# Patient Record
Sex: MALE | Race: OTHER | Employment: FULL TIME | URBAN - METROPOLITAN AREA
[De-identification: names, ages, dates, MRNs, and addresses within clinical notes are randomized per-mention and may not be internally consistent; named-entity substitution may affect disease eponyms.]

---

## 2022-07-01 ENCOUNTER — HOSPITAL ENCOUNTER (EMERGENCY)
Dept: ULTRASOUND IMAGING | Age: 42
Discharge: HOME | End: 2022-07-04
Payer: COMMERCIAL

## 2022-07-01 ENCOUNTER — HOSPITAL ENCOUNTER (EMERGENCY)
Age: 42
Discharge: HOME | End: 2022-07-01
Attending: EMERGENCY MEDICINE
Payer: COMMERCIAL

## 2022-07-01 ENCOUNTER — HOSPITAL ENCOUNTER (EMERGENCY)
Dept: CT IMAGING | Age: 42
Discharge: HOME | End: 2022-07-04
Payer: COMMERCIAL

## 2022-07-01 ENCOUNTER — HOSPITAL ENCOUNTER (INPATIENT)
Age: 42
LOS: 4 days | Discharge: HOME | DRG: 723 | End: 2022-07-05
Attending: FAMILY MEDICINE
Payer: COMMERCIAL

## 2022-07-01 VITALS
HEART RATE: 80 BPM | HEIGHT: 74 IN | SYSTOLIC BLOOD PRESSURE: 152 MMHG | RESPIRATION RATE: 16 BRPM | WEIGHT: 255 LBS | OXYGEN SATURATION: 97 % | DIASTOLIC BLOOD PRESSURE: 99 MMHG | TEMPERATURE: 97.9 F | BODY MASS INDEX: 32.73 KG/M2

## 2022-07-01 DIAGNOSIS — G89.3 CANCER ASSOCIATED PAIN: ICD-10-CM

## 2022-07-01 DIAGNOSIS — N50.89 TESTICULAR MASS: Primary | ICD-10-CM

## 2022-07-01 DIAGNOSIS — Z01.818 EXAMINATION PRIOR TO CHEMOTHERAPY: ICD-10-CM

## 2022-07-01 DIAGNOSIS — C62.90 SEMINOMA (HCC): Primary | ICD-10-CM

## 2022-07-01 LAB
ALBUMIN SERPL-MCNC: 4 G/DL (ref 3.5–5)
ALBUMIN/GLOB SERPL: 0.8 {RATIO} (ref 1.2–3.5)
ALP SERPL-CCNC: 140 U/L (ref 50–136)
ALT SERPL-CCNC: 57 U/L (ref 12–65)
ANION GAP SERPL CALC-SCNC: 6 MMOL/L (ref 7–16)
AST SERPL-CCNC: 35 U/L (ref 15–37)
BASOPHILS # BLD: 0 K/UL (ref 0–0.2)
BASOPHILS NFR BLD: 0 % (ref 0–2)
BILIRUB SERPL-MCNC: 0.6 MG/DL (ref 0.2–1.1)
BUN SERPL-MCNC: 16 MG/DL (ref 6–23)
CALCIUM SERPL-MCNC: 9.7 MG/DL (ref 8.3–10.4)
CHLORIDE SERPL-SCNC: 107 MMOL/L (ref 98–107)
CO2 SERPL-SCNC: 26 MMOL/L (ref 21–32)
CREAT SERPL-MCNC: 1.22 MG/DL (ref 0.8–1.5)
DIFFERENTIAL METHOD BLD: ABNORMAL
EOSINOPHIL # BLD: 0.1 K/UL (ref 0–0.8)
EOSINOPHIL NFR BLD: 1 % (ref 0.5–7.8)
ERYTHROCYTE [DISTWIDTH] IN BLOOD BY AUTOMATED COUNT: 12.7 % (ref 11.9–14.6)
GLOBULIN SER CALC-MCNC: 5.2 G/DL (ref 2.3–3.5)
GLUCOSE SERPL-MCNC: 169 MG/DL (ref 65–100)
HCG SERPL QL: POSITIVE
HCT VFR BLD AUTO: 44.6 % (ref 41.1–50.3)
HGB BLD-MCNC: 15.3 G/DL (ref 13.6–17.2)
IMM GRANULOCYTES # BLD AUTO: 0 K/UL (ref 0–0.5)
IMM GRANULOCYTES NFR BLD AUTO: 0 % (ref 0–5)
LYMPHOCYTES # BLD: 1.4 K/UL (ref 0.5–4.6)
LYMPHOCYTES NFR BLD: 20 % (ref 13–44)
MCH RBC QN AUTO: 29.4 PG (ref 26.1–32.9)
MCHC RBC AUTO-ENTMCNC: 34.3 G/DL (ref 31.4–35)
MCV RBC AUTO: 85.8 FL (ref 79.6–97.8)
MONOCYTES # BLD: 0.9 K/UL (ref 0.1–1.3)
MONOCYTES NFR BLD: 13 % (ref 4–12)
NEUTS SEG # BLD: 4.5 K/UL (ref 1.7–8.2)
NEUTS SEG NFR BLD: 66 % (ref 43–78)
NRBC # BLD: 0 K/UL (ref 0–0.2)
PLATELET # BLD AUTO: 297 K/UL (ref 150–450)
PMV BLD AUTO: 8.9 FL (ref 9.4–12.3)
POTASSIUM SERPL-SCNC: 3.8 MMOL/L (ref 3.5–5.1)
PROT SERPL-MCNC: 9.2 G/DL (ref 6.3–8.2)
RBC # BLD AUTO: 5.2 M/UL (ref 4.23–5.6)
SODIUM SERPL-SCNC: 139 MMOL/L (ref 136–145)
WBC # BLD AUTO: 6.9 K/UL (ref 4.3–11.1)

## 2022-07-01 PROCEDURE — 2580000003 HC RX 258: Performed by: FAMILY MEDICINE

## 2022-07-01 PROCEDURE — 99285 EMERGENCY DEPT VISIT HI MDM: CPT

## 2022-07-01 PROCEDURE — 85025 COMPLETE CBC W/AUTO DIFF WBC: CPT

## 2022-07-01 PROCEDURE — 80053 COMPREHEN METABOLIC PANEL: CPT

## 2022-07-01 PROCEDURE — 84703 CHORIONIC GONADOTROPIN ASSAY: CPT

## 2022-07-01 PROCEDURE — 76870 US EXAM SCROTUM: CPT

## 2022-07-01 PROCEDURE — 74176 CT ABD & PELVIS W/O CONTRAST: CPT

## 2022-07-01 PROCEDURE — 1100000000 HC RM PRIVATE

## 2022-07-01 PROCEDURE — 82105 ALPHA-FETOPROTEIN SERUM: CPT

## 2022-07-01 RX ORDER — ATORVASTATIN CALCIUM 20 MG/1
20 TABLET, FILM COATED ORAL NIGHTLY
Status: DISCONTINUED | OUTPATIENT
Start: 2022-07-01 | End: 2022-07-05 | Stop reason: HOSPADM

## 2022-07-01 RX ORDER — ENOXAPARIN SODIUM 100 MG/ML
30 INJECTION SUBCUTANEOUS EVERY 12 HOURS
Status: DISCONTINUED | OUTPATIENT
Start: 2022-07-02 | End: 2022-07-02

## 2022-07-01 RX ORDER — ATORVASTATIN CALCIUM 20 MG/1
20 TABLET, FILM COATED ORAL NIGHTLY
COMMUNITY
Start: 2022-03-21

## 2022-07-01 RX ORDER — POLYETHYLENE GLYCOL 3350 17 G/17G
17 POWDER, FOR SOLUTION ORAL DAILY PRN
Status: DISCONTINUED | OUTPATIENT
Start: 2022-07-01 | End: 2022-07-05 | Stop reason: HOSPADM

## 2022-07-01 RX ORDER — ONDANSETRON 2 MG/ML
4 INJECTION INTRAMUSCULAR; INTRAVENOUS EVERY 6 HOURS PRN
Status: DISCONTINUED | OUTPATIENT
Start: 2022-07-01 | End: 2022-07-05 | Stop reason: HOSPADM

## 2022-07-01 RX ORDER — SODIUM CHLORIDE 9 MG/ML
INJECTION, SOLUTION INTRAVENOUS PRN
Status: DISCONTINUED | OUTPATIENT
Start: 2022-07-01 | End: 2022-07-05 | Stop reason: HOSPADM

## 2022-07-01 RX ORDER — ACETAMINOPHEN 650 MG/1
650 SUPPOSITORY RECTAL EVERY 6 HOURS PRN
Status: DISCONTINUED | OUTPATIENT
Start: 2022-07-01 | End: 2022-07-05 | Stop reason: HOSPADM

## 2022-07-01 RX ORDER — ONDANSETRON 4 MG/1
4 TABLET, ORALLY DISINTEGRATING ORAL EVERY 8 HOURS PRN
Status: DISCONTINUED | OUTPATIENT
Start: 2022-07-01 | End: 2022-07-05 | Stop reason: HOSPADM

## 2022-07-01 RX ORDER — TELMISARTAN AND AMLODIPINE 10; 80 MG/1; MG/1
1 TABLET ORAL NIGHTLY
Status: ON HOLD | COMMUNITY
Start: 2022-06-29 | End: 2022-07-02

## 2022-07-01 RX ORDER — METFORMIN HYDROCHLORIDE 500 MG/1
TABLET, EXTENDED RELEASE ORAL
COMMUNITY
Start: 2022-06-29

## 2022-07-01 RX ORDER — SODIUM CHLORIDE 0.9 % (FLUSH) 0.9 %
5-40 SYRINGE (ML) INJECTION EVERY 12 HOURS SCHEDULED
Status: DISCONTINUED | OUTPATIENT
Start: 2022-07-01 | End: 2022-07-05 | Stop reason: HOSPADM

## 2022-07-01 RX ORDER — ATENOLOL 25 MG/1
50 TABLET ORAL DAILY
Status: DISCONTINUED | OUTPATIENT
Start: 2022-07-02 | End: 2022-07-05 | Stop reason: HOSPADM

## 2022-07-01 RX ORDER — TELMISARTAN AND AMLODIPINE 10; 80 MG/1; MG/1
1 TABLET ORAL NIGHTLY
Status: DISCONTINUED | OUTPATIENT
Start: 2022-07-01 | End: 2022-07-01 | Stop reason: CLARIF

## 2022-07-01 RX ORDER — ATENOLOL 50 MG/1
50 TABLET ORAL DAILY
COMMUNITY
Start: 2022-06-29

## 2022-07-01 RX ORDER — SPIRONOLACTONE 25 MG/1
50 TABLET ORAL DAILY
Status: DISCONTINUED | OUTPATIENT
Start: 2022-07-02 | End: 2022-07-05 | Stop reason: HOSPADM

## 2022-07-01 RX ORDER — PREDNISONE 20 MG/1
TABLET ORAL
Status: ON HOLD | COMMUNITY
Start: 2022-06-29 | End: 2022-07-05 | Stop reason: HOSPADM

## 2022-07-01 RX ORDER — SODIUM CHLORIDE 0.9 % (FLUSH) 0.9 %
5-40 SYRINGE (ML) INJECTION PRN
Status: DISCONTINUED | OUTPATIENT
Start: 2022-07-01 | End: 2022-07-05 | Stop reason: HOSPADM

## 2022-07-01 RX ORDER — HYDROCODONE BITARTRATE AND ACETAMINOPHEN 5; 325 MG/1; MG/1
1 TABLET ORAL EVERY 6 HOURS PRN
Status: DISCONTINUED | OUTPATIENT
Start: 2022-07-01 | End: 2022-07-05 | Stop reason: HOSPADM

## 2022-07-01 RX ORDER — TIZANIDINE 4 MG/1
4 TABLET ORAL NIGHTLY
COMMUNITY
Start: 2022-06-29 | End: 2022-07-06

## 2022-07-01 RX ORDER — AMLODIPINE BESYLATE 10 MG/1
10 TABLET ORAL NIGHTLY
Status: DISCONTINUED | OUTPATIENT
Start: 2022-07-01 | End: 2022-07-02 | Stop reason: SDUPTHER

## 2022-07-01 RX ORDER — SPIRONOLACTONE 50 MG/1
50 TABLET, FILM COATED ORAL DAILY
COMMUNITY
Start: 2022-03-21

## 2022-07-01 RX ORDER — LOSARTAN POTASSIUM 50 MG/1
100 TABLET ORAL NIGHTLY
Status: DISCONTINUED | OUTPATIENT
Start: 2022-07-01 | End: 2022-07-02 | Stop reason: SDUPTHER

## 2022-07-01 RX ORDER — MORPHINE SULFATE 2 MG/ML
1 INJECTION, SOLUTION INTRAMUSCULAR; INTRAVENOUS EVERY 4 HOURS PRN
Status: DISCONTINUED | OUTPATIENT
Start: 2022-07-01 | End: 2022-07-05 | Stop reason: HOSPADM

## 2022-07-01 RX ORDER — ACETAMINOPHEN 325 MG/1
650 TABLET ORAL EVERY 6 HOURS PRN
Status: DISCONTINUED | OUTPATIENT
Start: 2022-07-01 | End: 2022-07-05 | Stop reason: HOSPADM

## 2022-07-01 RX ADMIN — SODIUM CHLORIDE, PRESERVATIVE FREE 10 ML: 5 INJECTION INTRAVENOUS at 22:39

## 2022-07-01 ASSESSMENT — ENCOUNTER SYMPTOMS
DIARRHEA: 0
CONSTIPATION: 0
SHORTNESS OF BREATH: 0
NAUSEA: 0
BACK PAIN: 1
ABDOMINAL PAIN: 1
VOMITING: 0

## 2022-07-01 ASSESSMENT — PAIN - FUNCTIONAL ASSESSMENT: PAIN_FUNCTIONAL_ASSESSMENT: 0-10

## 2022-07-01 ASSESSMENT — PAIN SCALES - GENERAL
PAINLEVEL_OUTOF10: 0
PAINLEVEL_OUTOF10: 0

## 2022-07-01 NOTE — ED PROVIDER NOTES
[employer] Emergency Department Provider Note                   PCP:                None Provider               Age: 38 y.o. Sex: male       ICD-10-CM    1. Testicular mass  N50.89        DISPOSITION         New Prescriptions    No medications on file       Orders Placed This Encounter   Procedures    US SCROTUM AND TESTICLES    CT ABDOMEN PELVIS RENAL STONE Additional Contrast? None    CMP    CBC with Auto Differential    HCG Qualitative, Serum    AFP Tumor Marker    POCT Urine Dipstick    Saline lock IV        Kaylee Finn, APRN - CNP 8:42 PM      MDM  Number of Diagnoses or Management Options  Testicular mass  Diagnosis management comments: 74-year-old male with a history of hypertension and diabetes who presents emergency department today with complaint of 1 month of bilateral lower back pain that radiates into his lower abdomen. He states he noticed some left testicle swelling on Monday or Tuesday of this week. He denies any pain in his testicles. He appears in no acute distress today. Examination of the testicles was performed by Dr. Saurav Elmore. Spoke with radiologist regarding CT and ultrasound results. Concerning for testicular cancer with metastasis. Noted severe IVC narrowing. All results were discussed with the patient. He verbalizes understanding agreement of the concerns for testicular cancer with metastasis. He is agreeable to further work-up. Patient to be admitted with hospitalist at [facility]. Oncology states they will see him tomorrow. [facility] Initially he was not wanting to stay in the hospital overnight. I discussed the benefits of being admitted and having oncology see him in the morning. Patient was then agreeable for admission. He does wish to have his spouse drive him downtown. The charge nurse, Tee Martin states that this can be arranged and she will get everything set up.   Awaiting for the patient to get a and nursing note reviewed. Patient Vitals for the past 4 hrs:   Temp Pulse Resp BP SpO2   07/01/22 1808 97.9 °F (36.6 °C) 81 18 (!) 161/96 98 %          Physical Exam  Vitals and nursing note reviewed. Constitutional:       General: He is not in acute distress. Appearance: Normal appearance. He is obese. He is not ill-appearing, toxic-appearing or diaphoretic. HENT:      Head: Normocephalic and atraumatic. Right Ear: External ear normal.      Left Ear: External ear normal.      Nose: Nose normal.   Eyes:      Extraocular Movements: Extraocular movements intact. Conjunctiva/sclera: Conjunctivae normal.   Cardiovascular:      Rate and Rhythm: Normal rate. Heart sounds: Normal heart sounds. Pulmonary:      Effort: Pulmonary effort is normal. No respiratory distress. Breath sounds: Normal breath sounds. Abdominal:      General: Abdomen is flat. Bowel sounds are normal. There is no distension. Palpations: Abdomen is soft. Tenderness: There is abdominal tenderness in the right lower quadrant and left lower quadrant. Musculoskeletal:         General: Normal range of motion. Cervical back: Normal range of motion. Skin:     General: Skin is warm and dry. Capillary Refill: Capillary refill takes less than 2 seconds. Neurological:      General: No focal deficit present. Mental Status: He is alert and oriented to person, place, and time. Psychiatric:         Mood and Affect: Mood normal.         Behavior: Behavior normal.         Thought Content:  Thought content normal.         Judgment: Judgment normal.          Procedures    Labs Reviewed   COMPREHENSIVE METABOLIC PANEL - Abnormal; Notable for the following components:       Result Value    Anion Gap 6 (*)     Glucose 169 (*)     Alk Phosphatase 140 (*)     Total Protein 9.2 (*)     Globulin 5.2 (*)     Albumin/Globulin Ratio 0.8 (*)     All other components within normal limits   CBC WITH AUTO DIFFERENTIAL - Abnormal; Notable for the following components:    MPV 8.9 (*)     Monocytes 13 (*)     All other components within normal limits   HCG, SERUM, QUALITATIVE   AFP TUMOR MARKER        US SCROTUM AND TESTICLES   Final Result   1. Large left-sided testicular mass highly concerning for testicular malignancy   measuring 4.4 x 3.7 x 3.9 cm. Retroperitoneal lymphadenopathy on abdominal CT is   highly suspicious for metastatic disease. 2. Left-sided varicocele which increases with Valsalva. 3. Small bilateral hydroceles. Results discussed with Dr. Avalos over telephone with Dr. Masha Poole at 7:37 PM on   Friday, July 01, 2022. CT ABDOMEN PELVIS RENAL STONE Additional Contrast? None   Final Result   1. Massive retroperitoneal lymph node conglomerates and enlarged retrocrural   lymph node suspicious for lymphoma versus metastatic disease. There is severe   luminal narrowing of the IVC given mass effect upon the IVC and mass effect upon   the abdominal aorta as well as the iliopsoas musculature which is more common of   metastatic lymph nodes than lymphoma although remains indeterminate. 2. Splenomegaly although with a long slender appearance of the spleen measuring   up to 15 cm. 3. Given reported testicular pain and retroperitoneal lymphadenopathy a   testicular ultrasound is recommended for further evaluation to evaluate for   possible testicular malignancy. These results were communicated to Dr. Avalos over telephone with Dr. Masha Poole at   6:48 PM on Friday, July 01, 2022. ED Course as of 07/01/22 2042   Nuha Suarez Jul 01, 2022   1905 CT ABDOMEN PELVIS RENAL STONE Additional Contrast? None  IMPRESSION:  1. Massive retroperitoneal lymph node conglomerates and enlarged retrocrural  lymph node suspicious for lymphoma versus metastatic disease.  There is severe  luminal narrowing of the IVC given mass effect upon the IVC and mass effect upon  the abdominal aorta as well as the iliopsoas musculature which is more common of  metastatic lymph nodes than lymphoma although remains indeterminate. 2. Splenomegaly although with a long slender appearance of the spleen measuring  up to 15 cm. 3. Given reported testicular pain and retroperitoneal lymphadenopathy a  testicular ultrasound is recommended for further evaluation to evaluate for  possible testicular malignancy.    [BC]   1906 Perfect serve message to Dr. Min Busjerzy with urology. He states that he will review and arrange for a follow-up. [BC]   1945 US SCROTUM AND TESTICLES  IMPRESSION:  1. Large left-sided testicular mass highly concerning for testicular malignancy  measuring 4.4 x 3.7 x 3.9 cm. Retroperitoneal lymphadenopathy on abdominal CT is  highly suspicious for metastatic disease. 2. Left-sided varicocele which increases with Valsalva. 3. Small bilateral hydroceles. [BC]      ED Course User Index  [BC] Akira Alexandra, ROSEMARY - CNP        Voice dictation software was used during the making of this note. This software is not perfect and grammatical and other typographical errors may be present. This note has not been completely proofread for errors.        Akira Alexandra, APRN - [address]   07/01/22 2042

## 2022-07-02 ENCOUNTER — APPOINTMENT (OUTPATIENT)
Dept: CT IMAGING | Age: 42
DRG: 723 | End: 2022-07-02
Attending: FAMILY MEDICINE
Payer: COMMERCIAL

## 2022-07-02 LAB
AFP-TM SERPL-MCNC: 2.2 NG/ML
ANION GAP SERPL CALC-SCNC: 5 MMOL/L (ref 7–16)
BASOPHILS # BLD: 0 K/UL (ref 0–0.2)
BASOPHILS NFR BLD: 1 % (ref 0–2)
BUN SERPL-MCNC: 13 MG/DL (ref 6–23)
CALCIUM SERPL-MCNC: 9 MG/DL (ref 8.3–10.4)
CHLORIDE SERPL-SCNC: 109 MMOL/L (ref 98–107)
CO2 SERPL-SCNC: 27 MMOL/L (ref 21–32)
CREAT SERPL-MCNC: 1.1 MG/DL (ref 0.8–1.5)
DIFFERENTIAL METHOD BLD: ABNORMAL
EOSINOPHIL # BLD: 0.1 K/UL (ref 0–0.8)
EOSINOPHIL NFR BLD: 1 % (ref 0.5–7.8)
ERYTHROCYTE [DISTWIDTH] IN BLOOD BY AUTOMATED COUNT: 12.6 % (ref 11.9–14.6)
GLUCOSE BLD STRIP.AUTO-MCNC: 119 MG/DL (ref 65–100)
GLUCOSE BLD STRIP.AUTO-MCNC: 123 MG/DL (ref 65–100)
GLUCOSE BLD STRIP.AUTO-MCNC: 141 MG/DL (ref 65–100)
GLUCOSE SERPL-MCNC: 100 MG/DL (ref 65–100)
HCT VFR BLD AUTO: 42.1 % (ref 41.1–50.3)
HGB BLD-MCNC: 14.3 G/DL (ref 13.6–17.2)
IMM GRANULOCYTES # BLD AUTO: 0 K/UL (ref 0–0.5)
IMM GRANULOCYTES NFR BLD AUTO: 0 % (ref 0–5)
LYMPHOCYTES # BLD: 1.1 K/UL (ref 0.5–4.6)
LYMPHOCYTES NFR BLD: 16 % (ref 13–44)
MCH RBC QN AUTO: 29.2 PG (ref 26.1–32.9)
MCHC RBC AUTO-ENTMCNC: 34 G/DL (ref 31.4–35)
MCV RBC AUTO: 85.9 FL (ref 79.6–97.8)
MONOCYTES # BLD: 1.1 K/UL (ref 0.1–1.3)
MONOCYTES NFR BLD: 16 % (ref 4–12)
NEUTS SEG # BLD: 4.3 K/UL (ref 1.7–8.2)
NEUTS SEG NFR BLD: 66 % (ref 43–78)
NRBC # BLD: 0 K/UL (ref 0–0.2)
PLATELET # BLD AUTO: 276 K/UL (ref 150–450)
PMV BLD AUTO: 9 FL (ref 9.4–12.3)
POTASSIUM SERPL-SCNC: 3.9 MMOL/L (ref 3.5–5.1)
RBC # BLD AUTO: 4.9 M/UL (ref 4.23–5.6)
SERVICE CMNT-IMP: ABNORMAL
SODIUM SERPL-SCNC: 141 MMOL/L (ref 138–145)
WBC # BLD AUTO: 6.6 K/UL (ref 4.3–11.1)

## 2022-07-02 PROCEDURE — 1100000000 HC RM PRIVATE

## 2022-07-02 PROCEDURE — 74177 CT ABD & PELVIS W/CONTRAST: CPT

## 2022-07-02 PROCEDURE — 6360000002 HC RX W HCPCS: Performed by: INTERNAL MEDICINE

## 2022-07-02 PROCEDURE — 36415 COLL VENOUS BLD VENIPUNCTURE: CPT

## 2022-07-02 PROCEDURE — 6370000000 HC RX 637 (ALT 250 FOR IP): Performed by: FAMILY MEDICINE

## 2022-07-02 PROCEDURE — 99223 1ST HOSP IP/OBS HIGH 75: CPT | Performed by: INTERNAL MEDICINE

## 2022-07-02 PROCEDURE — 94760 N-INVAS EAR/PLS OXIMETRY 1: CPT

## 2022-07-02 PROCEDURE — 6360000004 HC RX CONTRAST MEDICATION: Performed by: INTERNAL MEDICINE

## 2022-07-02 PROCEDURE — 2580000003 HC RX 258: Performed by: FAMILY MEDICINE

## 2022-07-02 PROCEDURE — 85025 COMPLETE CBC W/AUTO DIFF WBC: CPT

## 2022-07-02 PROCEDURE — 82962 GLUCOSE BLOOD TEST: CPT

## 2022-07-02 PROCEDURE — 80048 BASIC METABOLIC PNL TOTAL CA: CPT

## 2022-07-02 RX ORDER — ENOXAPARIN SODIUM 100 MG/ML
40 INJECTION SUBCUTANEOUS DAILY
Status: DISCONTINUED | OUTPATIENT
Start: 2022-07-02 | End: 2022-07-05 | Stop reason: HOSPADM

## 2022-07-02 RX ORDER — ENOXAPARIN SODIUM 100 MG/ML
40 INJECTION SUBCUTANEOUS DAILY
Status: DISCONTINUED | OUTPATIENT
Start: 2022-07-03 | End: 2022-07-02

## 2022-07-02 RX ORDER — INSULIN LISPRO 100 [IU]/ML
0-8 INJECTION, SOLUTION INTRAVENOUS; SUBCUTANEOUS
Status: DISCONTINUED | OUTPATIENT
Start: 2022-07-02 | End: 2022-07-05 | Stop reason: HOSPADM

## 2022-07-02 RX ORDER — TELMISARTAN AND AMLODIPINE 5; 80 MG/1; MG/1
1 TABLET ORAL DAILY
Status: DISCONTINUED | OUTPATIENT
Start: 2022-07-02 | End: 2022-07-05 | Stop reason: HOSPADM

## 2022-07-02 RX ORDER — DEXTROSE MONOHYDRATE 50 MG/ML
100 INJECTION, SOLUTION INTRAVENOUS PRN
Status: DISCONTINUED | OUTPATIENT
Start: 2022-07-02 | End: 2022-07-05 | Stop reason: HOSPADM

## 2022-07-02 RX ORDER — GLUCAGON 1 MG/ML
1 KIT INJECTION PRN
Status: DISCONTINUED | OUTPATIENT
Start: 2022-07-02 | End: 2022-07-05 | Stop reason: HOSPADM

## 2022-07-02 RX ORDER — INSULIN LISPRO 100 [IU]/ML
0-4 INJECTION, SOLUTION INTRAVENOUS; SUBCUTANEOUS NIGHTLY
Status: DISCONTINUED | OUTPATIENT
Start: 2022-07-02 | End: 2022-07-05 | Stop reason: HOSPADM

## 2022-07-02 RX ORDER — TELMISARTAN AND AMLODIPINE 5; 80 MG/1; MG/1
1 TABLET ORAL DAILY
COMMUNITY

## 2022-07-02 RX ADMIN — IOPAMIDOL 100 ML: 755 INJECTION, SOLUTION INTRAVENOUS at 13:22

## 2022-07-02 RX ADMIN — ATORVASTATIN CALCIUM 20 MG: 20 TABLET, FILM COATED ORAL at 21:22

## 2022-07-02 RX ADMIN — ATENOLOL 50 MG: 25 TABLET ORAL at 10:41

## 2022-07-02 RX ADMIN — ENOXAPARIN SODIUM 40 MG: 40 INJECTION SUBCUTANEOUS at 11:47

## 2022-07-02 RX ADMIN — SODIUM CHLORIDE, PRESERVATIVE FREE 10 ML: 5 INJECTION INTRAVENOUS at 21:23

## 2022-07-02 RX ADMIN — TELMISARTAN AND AMLODIPINE 1 TABLET: 5; 80 TABLET ORAL at 10:42

## 2022-07-02 RX ADMIN — SODIUM CHLORIDE, PRESERVATIVE FREE 10 ML: 5 INJECTION INTRAVENOUS at 08:48

## 2022-07-02 RX ADMIN — DIATRIZOATE MEGLUMINE AND DIATRIZOATE SODIUM 15 ML: 660; 100 LIQUID ORAL; RECTAL at 09:58

## 2022-07-02 RX ADMIN — SPIRONOLACTONE 50 MG: 25 TABLET ORAL at 10:40

## 2022-07-02 RX ADMIN — ACETAMINOPHEN 650 MG: 325 TABLET, FILM COATED ORAL at 21:22

## 2022-07-02 ASSESSMENT — PAIN DESCRIPTION - FREQUENCY: FREQUENCY: OTHER (COMMENT)

## 2022-07-02 ASSESSMENT — PAIN SCALES - GENERAL
PAINLEVEL_OUTOF10: 3
PAINLEVEL_OUTOF10: 0

## 2022-07-02 ASSESSMENT — PAIN DESCRIPTION - DESCRIPTORS: DESCRIPTORS: ACHING

## 2022-07-02 ASSESSMENT — PAIN - FUNCTIONAL ASSESSMENT: PAIN_FUNCTIONAL_ASSESSMENT: ACTIVITIES ARE NOT PREVENTED

## 2022-07-02 ASSESSMENT — PAIN DESCRIPTION - PAIN TYPE: TYPE: ACUTE PAIN

## 2022-07-02 ASSESSMENT — PAIN DESCRIPTION - ONSET: ONSET: SUDDEN

## 2022-07-02 ASSESSMENT — PAIN DESCRIPTION - ORIENTATION: ORIENTATION: ANTERIOR

## 2022-07-02 ASSESSMENT — PAIN DESCRIPTION - LOCATION: LOCATION: HEAD

## 2022-07-02 NOTE — CONSULTS
[facility] Hematology & Oncology        Inpatient Hematology / Oncology Consult Note    Reason for Consult:  Testicular mass [N50.89]  Referring Physician:  Anastasia Russo MD    History of Present Illness:  Mr. Gustavo Simmons is a 38 y.o. male admitted on 7/1/2022 with a primary diagnosis of There were no encounter diagnoses. .      38 y.o. male past medical history of hypertension, hyperlipidemia, diabetes, presented to the emergency room with testicle swelling and lower back and abdominal pain. Reported low back pain intermittently over the past month, did not have work-up yet. Now he noticed testicular swelling with pain and presented to the emergency room for further evaluation. Scrotal ultrasound showed large left side testicular mass highly concerning for testicular malignancy measuring 4.4 x 3.7 x 3.9 cm. CT showed massive retroperitoneal lymph nodes conglomerates and a largest retrocrural lymph nodes suspicious for lymphoma versus metastatic disease. There is severe luminal narrowing of the IVC given the mass-effect upon the IVC and mass-effect upon the abdominal aorta as well as the iliopsoas musculature. Oncology is consulted for recommendation. Review of Systems:  Constitutional Denies fever, chills, weight loss, appetite changes, fatigue, night sweats. HEENT Denies trauma, blurry vision, hearing loss, ear pain, nosebleeds, sore throat, neck pain and ear discharge. Skin Denies lesions or rashes. Lungs Denies dyspnea, cough, sputum production or hemoptysis. Cardiovascular Denies chest pain, palpitations, or lower extremity edema. Gastrointestinal Denies nausea, vomiting, changes in bowel habits, bloody or black stools, abdominal pain.   testicular swelling. Denies dysuria, frequency or hesitancy of urination. Neuro Denies headaches, visual changes or ataxia. Denies dizziness, tingling, tremors, sensory change, speech change, focal weakness or headaches.      Hematology Denies easy bruising or bleeding, denies gingival bleeding or epistaxis. Endo Denies heat/cold intolerance, denies diabetes or thyroid abnormalities. MSK  back pain radiating to both legs. Denies arthralgias, myalgias or frequent falls. Psychiatric/Behavioral Denies depression and substance abuse. The patient is not nervous/anxious. No Known Allergies  No past medical history on file. No past surgical history on file. No family history on file. Social History     Socioeconomic History    Marital status:      Spouse name: Not on file    Number of children: Not on file    Years of education: Not on file    Highest education level: Not on file   Occupational History    Not on file   Tobacco Use    Smoking status: Never Smoker    Smokeless tobacco: Not on file   Vaping Use    Vaping Use: Never used   Substance and Sexual Activity    Alcohol use: Not on file    Drug use: Not on file    Sexual activity: Not on file   Other Topics Concern    Not on file   Social History Narrative    Not on file     Social Determinants of Health     Financial Resource Strain:     Difficulty of Paying Living Expenses: Not on file   Food Insecurity:     Worried About Running Out of Food in the Last Year: Not on file    Larry of Food in the Last Year: Not on file   Transportation Needs:     Lack of Transportation (Medical): Not on file    Lack of Transportation (Non-Medical):  Not on file   Physical Activity:     Days of Exercise per Week: Not on file    Minutes of Exercise per Session: Not on file   Stress:     Feeling of Stress : Not on file   Social Connections:     Frequency of Communication with Friends and Family: Not on file    Frequency of Social Gatherings with Friends and Family: Not on file    Attends Buddhist Services: Not on file    Active Member of Clubs or Organizations: Not on file    Attends Club or Organization Meetings: Not on file    Marital Status: Not on file   Intimate Partner Violence:     Fear of Current or Ex-Partner: Not on file    Emotionally Abused: Not on file    Physically Abused: Not on file    Sexually Abused: Not on file   Housing Stability:     Unable to Pay for Housing in the Last Year: Not on file    Number of [place] in the Last Year: Not on file    Unstable Housing in the Last Year: Not on file     Current Facility-Administered Medications   Medication Dose Route Frequency Provider Last Rate Last Admin    insulin lispro (HUMALOG) injection vial 0-8 Units  0-8 Units SubCUTAneous TID WC Lieutenant Adela MD        insulin lispro (HUMALOG) injection vial 0-4 Units  0-4 Units SubCUTAneous Nightly Lieutenant Adela MD        glucose chewable tablet 16 g  4 tablet Oral PRN Lieutenant Adela MD        dextrose bolus 10% 125 mL  125 mL IntraVENous PRN Lieutenant Adela MD        Or    dextrose bolus 10% 250 mL  250 mL IntraVENous PRN Lieutenant Adela MD        glucagon injection 1 mg  1 mg IntraMUSCular PRN Lieutenant Adela MD        dextrose 5 % solution  100 mL/hr IntraVENous PRN Lieutenant Adela MD        diatrizoate meglumine-sodium (GASTROGRAFIN) 66-10 % solution 15 mL  15 mL Oral ONCE PRN Lieutenant Adela MD   15 mL at 07/02/22 0958    telmisartan-amLODIPine (TWYNSTA) 80-5 MG tablet 1 tablet (Patient Supplied)  1 tablet Oral Daily Lieutenant Adela MD   1 tablet at 07/02/22 1042    enoxaparin (LOVENOX) injection 40 mg  40 mg SubCUTAneous Daily Lieutenant Adela MD   40 mg at 07/02/22 1147    atenolol (TENORMIN) tablet 50 mg  50 mg Oral Daily Patricia Vega MD   50 mg at 07/02/22 1041    atorvastatin (LIPITOR) tablet 20 mg  20 mg Oral Nightly Patricia Vega MD        spironolactone (ALDACTONE) tablet 50 mg  50 mg Oral Daily Patricia Vega MD   50 mg at 07/02/22 1040    sodium chloride flush 0.9 % injection 5-40 mL  5-40 mL IntraVENous 2 times per day Patricia Vega MD   10 mL at 07/02/22 0848    sodium chloride flush 0.9 % injection 5-40 mL  5-40 mL IntraVENous CAROLINA Izquierdo MD        0.9 % sodium chloride infusion   IntraVENous PRN Estephanie Izquierdo MD        ondansetron (ZOFRAN-ODT) disintegrating tablet 4 mg  4 mg Oral Q8H PRN Estephanie Izquierdo MD        Or    ondansetron [facility]) injection 4 mg  4 mg IntraVENous Q6H PRN Estephanie Izquierdo MD        polyethylene glycol [facility]) packet 17 g  17 g Oral Daily PRN Estephanie Izquierdo MD        acetaminophen (TYLENOL) tablet 650 mg  650 mg Oral Q6H PRN Estephanie Izquierdo MD        Or    acetaminophen (TYLENOL) suppository 650 mg  650 mg Rectal Q6H PRN Estephanie Izquierdo MD        HYDROcodone-acetaminophen [facility]) 5-325 MG per tablet 1 tablet  1 tablet Oral Q6H PRDAISHA Izquierdo MD        morphine injection 1 mg  1 mg IntraVENous Q4H PRDAISHA Izquierdo MD           OBJECTIVE:  Patient Vitals for the past 8 hrs:   BP Temp Temp src Pulse Resp SpO2   22 1529 (!) 129/95 97.6 °F (36.4 °C) Oral 64 16 98 %   22 1128 (!) 150/109 97.9 °F (36.6 °C) Axillary 86 18 98 %     Temp (24hrs), Av.8 °F (36.6 °C), Min:97.6 °F (36.4 °C), Max:98.1 °F (36.7 °C)    No intake/output data recorded. Physical Exam:  Constitutional: Well developed, well nourished male in no acute distress, sitting comfortably in the hospital bed. HEENT: Normocephalic and atraumatic. Oropharynx is clear, mucous membranes are moist.  Pupils are equal, round, and reactive to light. Extraocular muscles are intact. Sclerae anicteric. Neck supple without JVD. No thyromegaly present. Lymph node   No palpable submandibular, cervical, supraclavicular, axillary or inguinal lymph nodes. Skin Warm and dry. No bruising and no rash noted. No erythema. No pallor. Respiratory Lungs are clear to auscultation bilaterally without wheezes, rales or rhonchi, normal air exchange without accessory muscle use. CVS Normal rate, regular rhythm and normal S1 and S2.   No murmurs, 0.0 0.0 - 0.5 K/UL   HCG Qualitative, Serum    Collection Time: 07/01/22  7:38 PM   Result Value Ref Range    HCG, Ql. Positive     AFP Tumor Marker    Collection Time: 07/01/22  7:38 PM   Result Value Ref Range    AFP-Tumor Marker 2.20 <8.0 ng/mL   Basic Metabolic Panel w/ Reflex to MG    Collection Time: 07/02/22  5:50 AM   Result Value Ref Range    Sodium 141 138 - 145 mmol/L    Potassium 3.9 3.5 - 5.1 mmol/L    Chloride 109 (H) 98 - 107 mmol/L    CO2 27 21 - 32 mmol/L    Anion Gap 5 (L) 7 - 16 mmol/L    Glucose 100 65 - 100 mg/dL    BUN 13 6 - 23 MG/DL    CREATININE 1.10 0.8 - 1.5 MG/DL    GFR African American >60 >60 ml/min/1.73m2    GFR Non- >60 >60 ml/min/1.73m2    Calcium 9.0 8.3 - 10.4 MG/DL   CBC with Auto Differential    Collection Time: 07/02/22  5:50 AM   Result Value Ref Range    WBC 6.6 4.3 - 11.1 K/uL    RBC 4.90 4.23 - 5.6 M/uL    Hemoglobin 14.3 13.6 - 17.2 g/dL    Hematocrit 42.1 41.1 - 50.3 %    MCV 85.9 79.6 - 97.8 FL    MCH 29.2 26.1 - 32.9 PG    MCHC 34.0 31.4 - 35.0 g/dL    RDW 12.6 11.9 - 14.6 %    Platelets 877 947 - 118 K/uL    MPV 9.0 (L) 9.4 - 12.3 FL    nRBC 0.00 0.0 - 0.2 K/uL    Differential Type AUTOMATED      Seg Neutrophils 66 43 - 78 %    Lymphocytes 16 13 - 44 %    Monocytes 16 (H) 4.0 - 12.0 %    Eosinophils % 1 0.5 - 7.8 %    Basophils 1 0.0 - 2.0 %    Immature Granulocytes 0 0.0 - 5.0 %    Segs Absolute 4.3 1.7 - 8.2 K/UL    Absolute Lymph # 1.1 0.5 - 4.6 K/UL    Absolute Mono # 1.1 0.1 - 1.3 K/UL    Absolute Eos # 0.1 0.0 - 0.8 K/UL    Basophils Absolute 0.0 0.0 - 0.2 K/UL    Absolute Immature Granulocyte 0.0 0.0 - 0.5 K/UL   POCT Glucose    Collection Time: 07/02/22 11:48 AM   Result Value Ref Range    POC Glucose 119 (H) 65 - 100 mg/dL    Performed by:  Dru    POCT Glucose    Collection Time: 07/02/22  3:15 PM   Result Value Ref Range    POC Glucose 141 (H) 65 - 100 mg/dL    Performed by: Hyun Matamoros Imaging:  [unfilled]    ASSESSMENT/RECOMMENDATIONS:  [unfilled]  79-year-old 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, he need tumor markers for further diagnosis, patient and wife are tearful and discussed that this was a potentially rather curable cancer, typically would pursue inguinal radical orchiectomy for diagnosis and local disease control, nonetheless he is a typical case radiographically if beta-hCG is highly elevated, and in the severe narrowing of IVC is of clinical concern of complication unless chemotherapy is started soon, discussed with uroncology and may start chemo urgently if needed, otherwise we will arrange pulmonary function test and biopsy followed by [facility] early next week, will follow closely. Lab studies and imaging studies were personally reviewed. Pertinent old records were reviewed. Case discussed with primary team.    Thank you for allowing us to participate in the care of Mr. Melva Hernandez. Lolis Montez M.D.   [facility] [address], [address] [place] [address]  Office : [phone]  Fax : [phone]

## 2022-07-02 NOTE — MANAGEMENT PLAN
[facility] Hematology & Oncology        Inpatient Hematology / Oncology Plan of Care    Reason for Consult:  Testicular mass [N50.89]  Referring Physician:  Guero Ya MD    History of Present Illness:  Mr. Danny Berry is a 38 y.o. male admitted on 7/1/2022 with a primary diagnosis of There were no encounter diagnoses. Jean Teran He is a 38year old male with PMH of HTN, HLD, DM2 who presented to ED with testicular swelling and lower back and abdominal pain. He noted pain in his back about a month ago and had discussed with PCP, but no further work up had been completed. This week he noted testicular swelling so he presented to the ED for further evaluation. Scrotal US showed \"Large left-sided testicular mass highly concerning for testicular malignancy measuring 4.4 x 3.7 x 3.9 cm. \"  CTAP showed \"Massive retroperitoneal lymph node conglomerates and enlarged retrocrural  lymph node suspicious for lymphoma versus metastatic disease. There is severe luminal narrowing of the IVC given mass effect upon the IVC and mass effect upon the abdominal aorta as well as the iliopsoas musculature. \" CT CAP with contrast pending. AFP and beta HCG pending. We are consulted for recommendations for this patient. Review of Systems:   - for fevers or chills  +for testicular swelling   +back pain that radiates down legs prior to admission         No Known Allergies  No past medical history on file. No past surgical history on file. No family history on file.   Social History     Socioeconomic History    Marital status:      Spouse name: Not on file    Number of children: Not on file    Years of education: Not on file    Highest education level: Not on file   Occupational History    Not on file   Tobacco Use    Smoking status: Never Smoker    Smokeless tobacco: Not on file   Vaping Use    Vaping Use: Never used   Substance and Sexual Activity    Alcohol use: Not on file    Drug use: Not on file    Sexual activity: Not on file   Other Topics Concern    Not on file   Social History Narrative    Not on file     Social Determinants of Health     Financial Resource Strain:     Difficulty of Paying Living Expenses: Not on file   Food Insecurity:     Worried About Running Out of Food in the Last Year: Not on file    Larry of Food in the Last Year: Not on file   Transportation Needs:     Lack of Transportation (Medical): Not on file    Lack of Transportation (Non-Medical):  Not on file   Physical Activity:     Days of Exercise per Week: Not on file    Minutes of Exercise per Session: Not on file   Stress:     Feeling of Stress : Not on file   Social Connections:     Frequency of Communication with Friends and Family: Not on file    Frequency of Social Gatherings with Friends and Family: Not on file    Attends [employer] Services: Not on file    Active Member of [address] [place] [address] [employer] or Organizations: Not on file    Attends Club or Organization Meetings: Not on file    Marital Status: Not on file   Intimate Partner Violence:     Fear of Current or Ex-Partner: Not on file    Emotionally Abused: Not on file    Physically Abused: Not on file    Sexually Abused: Not on file   Housing Stability:     Unable to Pay for Housing in the Last Year: Not on file    Number of [place] in the Last Year: Not on file    Unstable Housing in the Last Year: Not on file     Current Facility-Administered Medications   Medication Dose Route Frequency Provider Last Rate Last Admin    insulin lispro (HUMALOG) injection vial 0-8 Units  0-8 Units SubCUTAneous TID WC Ventura Champion MD        insulin lispro (HUMALOG) injection vial 0-4 Units  0-4 Units SubCUTAneous Nightly Ventura Champion MD        glucose chewable tablet 16 g  4 tablet Oral PRN Ventura Champion MD        dextrose bolus 10% 125 mL  125 mL IntraVENous PRN Ventura Champion MD        Or    dextrose bolus 10% 250 mL  250 mL IntraVENous PRN Kisha Ewing Gee Quinonez MD        glucagon injection 1 mg  1 mg IntraMUSCular PRN Sanjay Saavedra MD        dextrose 5 % solution  100 mL/hr IntraVENous PRN Sanjay Saavedra MD        diatrizoate meglumine-sodium (GASTROGRAFIN) 66-10 % solution 15 mL  15 mL Oral ONCE PRN Sanjay Saavedar MD   15 mL at 07/02/22 0958    telmisartan-amLODIPine (TWYNSTA) 80-5 MG tablet 1 tablet (Patient Supplied)  1 tablet Oral Daily Sanjay Saavedra MD   1 tablet at 07/02/22 1042    enoxaparin (LOVENOX) injection 40 mg  40 mg SubCUTAneous Daily Sanjay Saavedra MD   40 mg at 07/02/22 1147    atenolol (TENORMIN) tablet 50 mg  50 mg Oral Daily Ciaran Soto MD   50 mg at 07/02/22 1041    atorvastatin (LIPITOR) tablet 20 mg  20 mg Oral Nightly Ciaran Soto MD        spironolactone (ALDACTONE) tablet 50 mg  50 mg Oral Daily Ciaran Soto MD   50 mg at 07/02/22 1040    sodium chloride flush 0.9 % injection 5-40 mL  5-40 mL IntraVENous 2 times per day Ciaran Soto MD   10 mL at 07/02/22 0848    sodium chloride flush 0.9 % injection 5-40 mL  5-40 mL IntraVENous PRN Ciaran Soto MD        0.9 % sodium chloride infusion   IntraVENous PRN Ciaran Soto MD        ondansetron (ZOFRAN-ODT) disintegrating tablet 4 mg  4 mg Oral Q8H PRN Ciaran Soto MD        Or    ondansetron [facility]) injection 4 mg  4 mg IntraVENous Q6H PRN Ciaran Soto MD        polyethylene glycol [facility]) packet 17 g  17 g Oral Daily PRN Ciaran Soto MD        acetaminophen (TYLENOL) tablet 650 mg  650 mg Oral Q6H PRN Ciaran Soto MD        Or    acetaminophen (TYLENOL) suppository 650 mg  650 mg Rectal Q6H PRN Ciaran Soto MD        HYDROcodone-acetaminophen [facility]) 5-325 MG per tablet 1 tablet  1 tablet Oral Q6H PRN Ciaran Soto MD        morphine injection 1 mg  1 mg IntraVENous Q4H PRN Ciaran Soto MD           OBJECTIVE:  Patient Vitals for the past 8 hrs:   BP Temp Temp src Pulse Resp SpO2   07/02/22 1529 (!) 129/95 97.6 °F (36.4 °C) Oral 64 16 98 %   22 1128 (!) 150/109 97.9 °F (36.6 °C) Axillary 86 18 98 %     Temp (24hrs), Av.8 °F (36.6 °C), Min:97.6 °F (36.4 °C), Max:98.1 °F (36.7 °C)    No intake/output data recorded. Physical Exam:  Constitutional: Well developed, well nourished male in no acute distress, sitting comfortably in the hospital bed. HEENT: Normocephalic and atraumatic. Oropharynx is clear, mucous membranes are moist.  Pupils are equal, round, and reactive to light. Extraocular muscles are intact. Sclerae anicteric. Neck supple without JVD. No thyromegaly present. Lymph node   Deferred completed by Dr. Zoran Snow and dry. No bruising and no rash noted. No erythema. No pallor. Respiratory Lungs are clear to auscultation bilaterally without wheezes, rales or rhonchi, normal air exchange without accessory muscle use. CVS Normal rate, regular rhythm and normal S1 and S2. No murmurs, gallops, or rubs. Abdomen Soft, nontender and nondistended, normoactive bowel sounds. No palpable mass. No hepatosplenomegaly. Neuro Grossly nonfocal with no obvious sensory or motor deficits. MSK Normal range of motion in general.  No edema and no tenderness. Psych Appropriate mood and affect.         Labs:    Recent Results (from the past 24 hour(s))   CMP    Collection Time: 22  7:38 PM   Result Value Ref Range    Sodium 139 136 - 145 mmol/L    Potassium 3.8 3.5 - 5.1 mmol/L    Chloride 107 98 - 107 mmol/L    CO2 26 21 - 32 mmol/L    Anion Gap 6 (L) 7 - 16 mmol/L    Glucose 169 (H) 65 - 100 mg/dL    BUN 16 6 - 23 MG/DL    CREATININE 1.22 0.8 - 1.5 MG/DL    GFR African American >60 >60 ml/min/1.73m2    GFR Non- >60 >60 ml/min/1.73m2    Calcium 9.7 8.3 - 10.4 MG/DL    Total Bilirubin 0.6 0.2 - 1.1 MG/DL    ALT 57 12 - 65 U/L    AST 35 15 - 37 U/L    Alk Phosphatase 140 (H) 50 - 136 U/L    Total Protein 9.2 (H) 6.3 - 8.2 g/dL    Albumin 4.0 3.5 - 5.0 g/dL    Globulin 5.2 (H) 2.3 - 3.5 g/dL    Albumin/Globulin Ratio 0.8 (L) 1.2 - 3.5     CBC with Auto Differential    Collection Time: 07/01/22  7:38 PM   Result Value Ref Range    WBC 6.9 4.3 - 11.1 K/uL    RBC 5.20 4.23 - 5.6 M/uL    Hemoglobin 15.3 13.6 - 17.2 g/dL    Hematocrit 44.6 41.1 - 50.3 %    MCV 85.8 79.6 - 97.8 FL    MCH 29.4 26.1 - 32.9 PG    MCHC 34.3 31.4 - 35.0 g/dL    RDW 12.7 11.9 - 14.6 %    Platelets 055 288 - 150 K/uL    MPV 8.9 (L) 9.4 - 12.3 FL    nRBC 0.00 0.0 - 0.2 K/uL    Differential Type AUTOMATED      Seg Neutrophils 66 43 - 78 %    Lymphocytes 20 13 - 44 %    Monocytes 13 (H) 4.0 - 12.0 %    Eosinophils % 1 0.5 - 7.8 %    Basophils 0 0.0 - 2.0 %    Immature Granulocytes 0 0.0 - 5.0 %    Segs Absolute 4.5 1.7 - 8.2 K/UL    Absolute Lymph # 1.4 0.5 - 4.6 K/UL    Absolute Mono # 0.9 0.1 - 1.3 K/UL    Absolute Eos # 0.1 0.0 - 0.8 K/UL    Basophils Absolute 0.0 0.0 - 0.2 K/UL    Absolute Immature Granulocyte 0.0 0.0 - 0.5 K/UL   HCG Qualitative, Serum    Collection Time: 07/01/22  7:38 PM   Result Value Ref Range    HCG, Ql. Positive     AFP Tumor Marker    Collection Time: 07/01/22  7:38 PM   Result Value Ref Range    AFP-Tumor Marker 2.20 <8.0 ng/mL   Basic Metabolic Panel w/ Reflex to MG    Collection Time: 07/02/22  5:50 AM   Result Value Ref Range    Sodium 141 138 - 145 mmol/L    Potassium 3.9 3.5 - 5.1 mmol/L    Chloride 109 (H) 98 - 107 mmol/L    CO2 27 21 - 32 mmol/L    Anion Gap 5 (L) 7 - 16 mmol/L    Glucose 100 65 - 100 mg/dL    BUN 13 6 - 23 MG/DL    CREATININE 1.10 0.8 - 1.5 MG/DL    GFR African American >60 >60 ml/min/1.73m2    GFR Non- >60 >60 ml/min/1.73m2    Calcium 9.0 8.3 - 10.4 MG/DL   CBC with Auto Differential    Collection Time: 07/02/22  5:50 AM   Result Value Ref Range    WBC 6.6 4.3 - 11.1 K/uL    RBC 4.90 4.23 - 5.6 M/uL    Hemoglobin 14.3 13.6 - 17.2 g/dL    Hematocrit 42.1 41.1 - 50.3 %    MCV 85.9 79.6 - 97.8 FL    MCH 29.2 26.1 - 32.9 PG MCHC 34.0 31.4 - 35.0 g/dL    RDW 12.6 11.9 - 14.6 %    Platelets 651 730 - 938 K/uL    MPV 9.0 (L) 9.4 - 12.3 FL    nRBC 0.00 0.0 - 0.2 K/uL    Differential Type AUTOMATED      Seg Neutrophils 66 43 - 78 %    Lymphocytes 16 13 - 44 %    Monocytes 16 (H) 4.0 - 12.0 %    Eosinophils % 1 0.5 - 7.8 %    Basophils 1 0.0 - 2.0 %    Immature Granulocytes 0 0.0 - 5.0 %    Segs Absolute 4.3 1.7 - 8.2 K/UL    Absolute Lymph # 1.1 0.5 - 4.6 K/UL    Absolute Mono # 1.1 0.1 - 1.3 K/UL    Absolute Eos # 0.1 0.0 - 0.8 K/UL    Basophils Absolute 0.0 0.0 - 0.2 K/UL    Absolute Immature Granulocyte 0.0 0.0 - 0.5 K/UL   POCT Glucose    Collection Time: 07/02/22 11:48 AM   Result Value Ref Range    POC Glucose 119 (H) 65 - 100 mg/dL    Performed by: Dru    POCT Glucose    Collection Time: 07/02/22  3:15 PM   Result Value Ref Range    POC Glucose 141 (H) 65 - 100 mg/dL    Performed by: Herve        Imaging:  Richelle Louie Jul 2, 2022  1:[phone]          CT CHEST ABDOMEN PELVIS W CONTRAST: Patient Communication     Not Released  Not seen     Radiation Dose Estimates    No radiation information found for this patient  Narrative   CT of the Chest, Abdomen, and Pelvis       INDICATION: Low abdomen pain, testicular mass       Multiple axial images were obtained through the chest, abdomen, and pelvis. Oral contrast was used for bowel opacification.  100mL of Isovue 370 intravenous   contrast was used for better evaluation of solid organs and vascular structures.    Radiation dose reduction techniques were used for this study.  All CT scans   performed at this facility use one or all of the following: Automated exposure   control, adjustment of the mA and/or kVp according to patient's size, iterative   reconstruction.       COMPARISON: Noncontrast abdomen CT dated 07/01/2022       FINDINGS:   - LUNGS: No infiltrates, masses, or effusions.   - MEDIASTINUM/AXILLA: No significant adenopathy.    - HEART/VESSELS: Normal.   - CHEST WALL: Normal.       - LIVER: Diffuse fatty infiltration.  No significant liver mass.  Portal vein is   patent. - GALLBLADDER/BILE DUCTS: No gallstones or bile duct dilatation.   - PANCREAS: Normal.   - SPLEEN: Normal.       - ADRENALS:  Normal.   - KIDNEYS/URETERS: Mild bilateral prominence of the collecting systems, likely   due to ureteral compression.  No definite renal mass. - BLADDER: Normal.   - REPRODUCTIVE ORGANS: Asymmetric enhancement of the left testicle consistent   with history of a mass.       - BOWEL: Normal caliber.  No inflammatory changes.   - LYMPH NODES: There are multiple enlarged retroperitoneal lymph nodes with   associated compression of the inferior vena cava.  Largest node measures 8.2 x   6.9 cm.  Small retrocrural lymph nodes are also present.  No significant pelvic   adenopathy.   - BONES: No fracture or significant bone lesion.   - VASCULATURE: Normal   - OTHER: No ascites.           Impression   1.  Extensive retroperitoneal adenopathy.  Associated compression of the   inferior vena cava. 2.  Small retrocrural lymph nodes. 3.  No significant masses or adenopathy in the chest.   4.  Hypervascular left testicle consistent with history of testicular mass.           If there are any questions about this report, I can be reached on [place] [employer]   or at [phone]               RECOMMENDATIONS:  Testicular mass  - Concerning for testicular cancer  - Check CT CAP for staging  - Check AFP and beta HCG. - Consult IR for biopsy   - Dr. Laya Muñoz had long discussion with pt and wife about future plans and plans going forward regarding workup. Lab studies and imaging studies  were personally reviewed. Pertinent old records were reviewed. Thank you for allowing us to participate in the care of Mr. Philip Ross. Formal consult note by Dr. Laya Muñoz to follow.             [address] [place] [address], [address] [place] [address] Hematology and Oncology/Radiation Oncology   [facility] [address]URRAVEDW [address]  Office : [phone]  Fax : [phone]

## 2022-07-02 NOTE — PROGRESS NOTES
END OF SHIFT SUMMARY:    Significant vitals this shift:  none  Significant labs this shift:  none  Tests performed this shift:  CT CAP  Orders to be followed up on:  IR consult  Blood products given this shift:  none  Additional events this shift:   none    I/Os:  +/- this shift: occurences  No intake/output data recorded. Occurrences this Shift:  Urine 2, BM 1, Emesis 0  I/O last 3 completed shifts:   In: 480 [P.O.:480]  Out: [address] 116, RN

## 2022-07-02 NOTE — PROGRESS NOTES
Hospitalist Progress Note   Admit Date:  2022  9:24 PM   Name:  Rica Newman   Age:  38 y.o. Sex:  male  :  1980   MRN:  [id number]   Room:  [facility]/    Presenting Complaint: No chief complaint on file. Reason(s) for Admission: Testicular mass [N50.89]     Hospital Course & Interval History:   Rica Newman is a 38 y.o. male with medical history of HTN, HLD, DM2 who presented to ED with testicular swelling and lower back and abdominal pain. Symptoms of pain started about 1 month ago, but he first noticed the testicular swelling early this week. Scrotal US showed \"Large left-sided testicular mass highly concerning for testicular malignancy measuring 4.4 x 3.7 x 3.9 cm. \"  CTAP showed \"Massive retroperitoneal lymph node conglomerates and enlarged retrocrural  lymph node suspicious for lymphoma versus metastatic disease. There is severe luminal narrowing of the IVC given mass effect upon the IVC and mass effect upon the abdominal aorta as well as the iliopsoas musculature \"    Subjective/24hr Events (22): Resting comfortably. No pain. Still with testicular swelling. No fevers. Assessment & Plan:       Testicular mass  -oncology consulted. Most likely malignant  -check CT with contrast this time, C/A/P for staging  -BMP in AM  -defer biopsy to oncology      Hypertension, essential  -uncontrolled. Resume home meds      Hyperlipidemia  -Controlled. Continue home meds      Type 2 diabetes mellitus with hyperglycemia, without long-term current use of insulin ([address]  -start ISS  -check a1c in AM  -hold home metformin for contrast studies      Discharge Planning:    -no anticipated needs. Home at discharge    Diet:  ADULT DIET;  Regular; 4 carb choices (60 gm/meal)  DVT PPx: lovenox  Code status: Full Code    Hospital Problems:  Principal Problem:    Testicular mass  Active Problems:    Hypertension, essential    Hyperlipidemia    Type 2 diabetes mellitus with hyperglycemia, without long-term current use of insulin ([facility] [address]  Resolved Problems:    * No resolved hospital problems. *      Objective:     Patient Vitals for the past 24 hrs:   Temp Pulse Resp BP SpO2   07/02/22 0749 97.6 °F (36.4 °C) 72 20 (!) 141/103 97 %   07/02/22 0308 97.6 °F (36.4 °C) 66 18 127/81 98 %   07/01/22 2246 -- 65 -- (!) 135/91 --   07/01/22 2133 98.1 °F (36.7 °C) 78 20 (!) 167/114 98 %       Oxygen Therapy  SpO2: 97 %  Pulse Oximetry Type: Intermittent  O2 Device: None (Room air)    Estimated body mass index is 32.8 kg/m² as calculated from the following:    Height as of this encounter: 6' 2\" (1.88 m). Weight as of this encounter: 255 lb 8 oz (115.9 kg). Intake/Output Summary (Last 24 hours) at 7/2/2022 0929  Last data filed at 7/2/2022 0300  Gross per 24 hour   Intake 480 ml   Output 200 ml   Net 280 ml         Physical Exam:     Blood pressure (!) 141/103, pulse 72, temperature 97.6 °F (36.4 °C), temperature source Oral, resp. rate 20, height 6' 2\" (1.88 m), weight 255 lb 8 oz (115.9 kg), SpO2 97 %. General:    Well nourished. Head:  Normocephalic, atraumatic  Eyes:  Sclerae appear normal.  Pupils equally round. ENT:  Nares appear normal, no drainage. Moist oral mucosa  Neck:  No restricted ROM. Trachea midline   CV:   No jugular venous distension. Lungs:   Respirations even, unlabored  Abdomen:    nondistended. :  Scrotal swelling  Extremities: No cyanosis or clubbing. No edema  Skin:     No rashes and normal coloration. Warm and dry. Neuro:  CN II-XII grossly intact. Sensation intact. A&Ox3  Psych:  Normal mood and affect.       I have reviewed ordered lab tests and independently visualized imaging below:    Recent Labs:  Recent Results (from the past 48 hour(s))   CMP    Collection Time: 07/01/22  7:38 PM   Result Value Ref Range    Sodium 139 136 - 145 mmol/L    Potassium 3.8 3.5 - 5.1 mmol/L    Chloride 107 98 - 107 mmol/L    CO2 26 21 - 32 mmol/L    Anion Gap 6 (L) 7 - 16 mmol/L    Glucose 169 (H) 65 - 100 mg/dL    BUN 16 6 - 23 MG/DL    CREATININE 1.22 0.8 - 1.5 MG/DL    GFR African American >60 >60 ml/min/1.73m2    GFR Non- >60 >60 ml/min/1.73m2    Calcium 9.7 8.3 - 10.4 MG/DL    Total Bilirubin 0.6 0.2 - 1.1 MG/DL    ALT 57 12 - 65 U/L    AST 35 15 - 37 U/L    Alk Phosphatase 140 (H) 50 - 136 U/L    Total Protein 9.2 (H) 6.3 - 8.2 g/dL    Albumin 4.0 3.5 - 5.0 g/dL    Globulin 5.2 (H) 2.3 - 3.5 g/dL    Albumin/Globulin Ratio 0.8 (L) 1.2 - 3.5     CBC with Auto Differential    Collection Time: 07/01/22  7:38 PM   Result Value Ref Range    WBC 6.9 4.3 - 11.1 K/uL    RBC 5.20 4.23 - 5.6 M/uL    Hemoglobin 15.3 13.6 - 17.2 g/dL    Hematocrit 44.6 41.1 - 50.3 %    MCV 85.8 79.6 - 97.8 FL    MCH 29.4 26.1 - 32.9 PG    MCHC 34.3 31.4 - 35.0 g/dL    RDW 12.7 11.9 - 14.6 %    Platelets 841 244 - 554 K/uL    MPV 8.9 (L) 9.4 - 12.3 FL    nRBC 0.00 0.0 - 0.2 K/uL    Differential Type AUTOMATED      Seg Neutrophils 66 43 - 78 %    Lymphocytes 20 13 - 44 %    Monocytes 13 (H) 4.0 - 12.0 %    Eosinophils % 1 0.5 - 7.8 %    Basophils 0 0.0 - 2.0 %    Immature Granulocytes 0 0.0 - 5.0 %    Segs Absolute 4.5 1.7 - 8.2 K/UL    Absolute Lymph # 1.4 0.5 - 4.6 K/UL    Absolute Mono # 0.9 0.1 - 1.3 K/UL    Absolute Eos # 0.1 0.0 - 0.8 K/UL    Basophils Absolute 0.0 0.0 - 0.2 K/UL    Absolute Immature Granulocyte 0.0 0.0 - 0.5 K/UL   HCG Qualitative, Serum    Collection Time: 07/01/22  7:38 PM   Result Value Ref Range    HCG, Ql. Positive     Basic Metabolic Panel w/ Reflex to MG    Collection Time: 07/02/22  5:50 AM   Result Value Ref Range    Sodium 141 138 - 145 mmol/L    Potassium 3.9 3.5 - 5.1 mmol/L    Chloride 109 (H) 98 - 107 mmol/L    CO2 27 21 - 32 mmol/L    Anion Gap 5 (L) 7 - 16 mmol/L    Glucose 100 65 - 100 mg/dL    BUN 13 6 - 23 MG/DL    CREATININE 1.10 0.8 - 1.5 MG/DL    GFR African American >60 >60 ml/min/1.73m2    GFR Non- >60 >60 ml/min/1.73m2    Calcium 9.0 8.3 - 10.4 MG/DL   CBC with Auto Differential    Collection Time: 07/02/22  5:50 AM   Result Value Ref Range    WBC 6.6 4.3 - 11.1 K/uL    RBC 4.90 4.23 - 5.6 M/uL    Hemoglobin 14.3 13.6 - 17.2 g/dL    Hematocrit 42.1 41.1 - 50.3 %    MCV 85.9 79.6 - 97.8 FL    MCH 29.2 26.1 - 32.9 PG    MCHC 34.0 31.4 - 35.0 g/dL    RDW 12.6 11.9 - 14.6 %    Platelets 068 285 - 446 K/uL    MPV 9.0 (L) 9.4 - 12.3 FL    nRBC 0.00 0.0 - 0.2 K/uL    Differential Type AUTOMATED      Seg Neutrophils 66 43 - 78 %    Lymphocytes 16 13 - 44 %    Monocytes 16 (H) 4.0 - 12.0 %    Eosinophils % 1 0.5 - 7.8 %    Basophils 1 0.0 - 2.0 %    Immature Granulocytes 0 0.0 - 5.0 %    Segs Absolute 4.3 1.7 - 8.2 K/UL    Absolute Lymph # 1.1 0.5 - 4.6 K/UL    Absolute Mono # 1.1 0.1 - 1.3 K/UL    Absolute Eos # 0.1 0.0 - 0.8 K/UL    Basophils Absolute 0.0 0.0 - 0.2 K/UL    Absolute Immature Granulocyte 0.0 0.0 - 0.5 K/UL       Other Studies:  No orders to display       Current Meds:  Current Facility-Administered Medications   Medication Dose Route Frequency    atenolol (TENORMIN) tablet 50 mg  50 mg Oral Daily    atorvastatin (LIPITOR) tablet 20 mg  20 mg Oral Nightly    spironolactone (ALDACTONE) tablet 50 mg  50 mg Oral Daily    sodium chloride flush 0.9 % injection 5-40 mL  5-40 mL IntraVENous 2 times per day    sodium chloride flush 0.9 % injection 5-40 mL  5-40 mL IntraVENous PRN    0.9 % sodium chloride infusion   IntraVENous PRN    enoxaparin Sodium (LOVENOX) injection 30 mg  30 mg SubCUTAneous Q12H    ondansetron (ZOFRAN-ODT) disintegrating tablet 4 mg  4 mg Oral Q8H PRN    Or    ondansetron (ZOFRAN) injection 4 mg  4 mg IntraVENous Q6H PRN    polyethylene glycol (GLYCOLAX) packet 17 g  17 g Oral Daily PRN    acetaminophen (TYLENOL) tablet 650 mg  650 mg Oral Q6H PRN    Or    acetaminophen (TYLENOL) suppository 650 mg  650 mg Rectal Q6H PRN    HYDROcodone-acetaminophen (NORCO) 5-325 MG per tablet 1 tablet  1 tablet Oral Q6H PRN    morphine injection 1 mg  1 mg IntraVENous Q4H PRN    losartan (COZAAR) tablet 100 mg  100 mg Oral Nightly    And    amLODIPine (NORVASC) tablet 10 mg  10 mg Oral Nightly       Signed:  Mikhail Stokes MD    Part of this note may have been written by using a voice dictation software. The note has been proof read but may still contain some grammatical/other typographical errors.

## 2022-07-02 NOTE — FLOWSHEET NOTE
07/01/22 2130   Dual Clinician Skin Assessment   Dual Skin Assessment (4 Eyes) WDL   Second Clinical  (First and Last Name) Ramo Gray   Skin Integumentary    Skin Integumentary (WDL) FROYLANL

## 2022-07-02 NOTE — H&P
Hospitalist History and Physical   Admit Date:  No admission date for patient encounter. Name:  Viri Kelley   Age:  38 y.o. Sex:  male  :  1980   MRN:  [id number]     Presenting Complaint: testicular swelling  Reason(s) for Admission: testicular cancer with mets     History of Present Illness:   Viri Kelley is a 38 y.o. male with medical history of HTN, HLD, DM2 who presented to ED with testicular swelling and lower back and abdominal pain. Symptoms of pain started about 1 month ago, but he first noticed the testicular swelling early this week. Denies specific testicular pain. Denies fevers/chills. Upon ER evaluation, CT was obtained which shows:    1.  Massive retroperitoneal lymph node conglomerates and enlarged retrocrural   lymph node suspicious for lymphoma versus metastatic disease. There is severe   luminal narrowing of the IVC given mass effect upon the IVC and mass effect upon   the abdominal aorta as well as the iliopsoas musculature which is more common of   metastatic lymph nodes than lymphoma although remains indeterminate. 2. Splenomegaly although with a long slender appearance of the spleen measuring   up to 15 cm. 3. Given reported testicular pain and retroperitoneal lymphadenopathy a   testicular ultrasound is recommended for further evaluation to evaluate for   possible testicular malignancy. Ultrasound results show:  1.  Large left-sided testicular mass highly concerning for testicular malignancy   measuring 4.4 x 3.7 x 3.9 cm. Retroperitoneal lymphadenopathy on abdominal CT is   highly suspicious for metastatic disease. 2. Left-sided varicocele which increases with Valsalva. 3. Small bilateral hydroceles. Findings were discussed with patient. Dr. Denae Tompkins of Oncology was consulted and recommends admission to [facility] Oncology floor for further workup. Hospitalist to admit. Review of Systems:  10 systems reviewed and negative except as noted in HPI.   Assessment & Plan:   Testicular Mass  - Associated lymphadenopathy, highly concerning for malignancy  - Urine HCG is also positive, indicating malignancy  - Oncology consulted and will see in AM  - Will admit patient to Oncology floor at [facility]  - PRN pain meds    HTN  - Continue home antihypertensives    DM2  - Holding home metformin  - Diabetic diet    HLD  - Continue home statin    Past medical history reviewed, includes hypertension, diabetes, hyperlipidemia. Past surgical history reviewed. Non-contributory. No Known Allergies   Social History     Tobacco Use    Smoking status: Not on file    Smokeless tobacco: Not on file   Substance Use Topics    Alcohol use: Not on file         Family history reviewed and noncontributory to patient's acute condition; no relevant family history unless otherwise noted above. There is no immunization history on file for this patient. PTA Medications:      Objective:     Estimated body mass index is 32.74 kg/m² as calculated from the following:    Height as of an earlier encounter on 7/1/22: 6' 2\" (1.88 m). Weight as of an earlier encounter on 7/1/22: 255 lb (115.7 kg). No intake or output data in the 24 hours ending 07/01/22 2052      Physical Exam:  General:    Well nourished. No overt distress  Head:  Normocephalic, atraumatic  Eyes:  Sclerae appear normal.  Pupils equally round. HENT:  Nares appear normal, no drainage. Moist mucous membranes  Neck:  No restricted ROM. Trachea midline  CV:   RRR. S1/S2 auscultated  Lungs:   CTAB. No wheezing, rhonchi, or rales. Appears even, unlabored  Abdomen: Bowel sounds present. Soft. TTP in lower abdomen. Extremities: Warm and dry. No cyanosis or clubbing. No edema. Skin:     No rashes. Normal turgor. Normal coloration  Neuro:  Cranial nerves II-XII grossly intact. Sensation intact  Psych:  Normal mood and affect.   Alert and oriented x3    Data Ordered and Personally Reviewed:    Last 24hr Labs:  Recent Results (from the past 24 hour(s))   CMP    Collection Time: 07/01/22  7:38 PM   Result Value Ref Range    Sodium 139 136 - 145 mmol/L    Potassium 3.8 3.5 - 5.1 mmol/L    Chloride 107 98 - 107 mmol/L    CO2 26 21 - 32 mmol/L    Anion Gap 6 (L) 7 - 16 mmol/L    Glucose 169 (H) 65 - 100 mg/dL    BUN 16 6 - 23 MG/DL    CREATININE 1.22 0.8 - 1.5 MG/DL    GFR African American >60 >60 ml/min/1.73m2    GFR Non- >60 >60 ml/min/1.73m2    Calcium 9.7 8.3 - 10.4 MG/DL    Total Bilirubin 0.6 0.2 - 1.1 MG/DL    ALT 57 12 - 65 U/L    AST 35 15 - 37 U/L    Alk Phosphatase 140 (H) 50 - 136 U/L    Total Protein 9.2 (H) 6.3 - 8.2 g/dL    Albumin 4.0 3.5 - 5.0 g/dL    Globulin 5.2 (H) 2.3 - 3.5 g/dL    Albumin/Globulin Ratio 0.8 (L) 1.2 - 3.5     CBC with Auto Differential    Collection Time: 07/01/22  7:38 PM   Result Value Ref Range    WBC 6.9 4.3 - 11.1 K/uL    RBC 5.20 4.23 - 5.6 M/uL    Hemoglobin 15.3 13.6 - 17.2 g/dL    Hematocrit 44.6 41.1 - 50.3 %    MCV 85.8 79.6 - 97.8 FL    MCH 29.4 26.1 - 32.9 PG    MCHC 34.3 31.4 - 35.0 g/dL    RDW 12.7 11.9 - 14.6 %    Platelets 459 603 - 590 K/uL    MPV 8.9 (L) 9.4 - 12.3 FL    nRBC 0.00 0.0 - 0.2 K/uL    Differential Type AUTOMATED      Seg Neutrophils 66 43 - 78 %    Lymphocytes 20 13 - 44 %    Monocytes 13 (H) 4.0 - 12.0 %    Eosinophils % 1 0.5 - 7.8 %    Basophils 0 0.0 - 2.0 %    Immature Granulocytes 0 0.0 - 5.0 %    Segs Absolute 4.5 1.7 - 8.2 K/UL    Absolute Lymph # 1.4 0.5 - 4.6 K/UL    Absolute Mono # 0.9 0.1 - 1.3 K/UL    Absolute Eos # 0.1 0.0 - 0.8 K/UL    Basophils Absolute 0.0 0.0 - 0.2 K/UL    Absolute Immature Granulocyte 0.0 0.0 - 0.5 K/UL   HCG Qualitative, Serum    Collection Time: 07/01/22  7:38 PM   Result Value Ref Range    HCG, Ql. Positive         Signed:  Fitz Griffin MD

## 2022-07-02 NOTE — ED NOTES
Report given to Bradly Gentile on 5th floor SFD. Pt transferred POV with IV in place. Instructions of transfer to [facility] given to pt and wife and both confirmed understanding. Pt left ambulatory with wife.        Radha Healy RN  07/01/22 2056

## 2022-07-02 NOTE — PROGRESS NOTES
TRANSFER - IN REPORT:    Verbal report received from [facility] on Sherly Speaks  being received from [facility] ED for routine progression of patient care      Report consisted of patient's Situation, Background, Assessment and   Recommendations(SBAR). Information from the following report(s) Nurse Handoff Report, ED SBAR and Recent Results was reviewed with the receiving nurse. Opportunity for questions and clarification was provided. Assessment will be completed upon patient's arrival to unit and care assumed.

## 2022-07-02 NOTE — PROGRESS NOTES
EOS note:    Received fr [place] ED. POC explained. BP elevated intially;quite anxious as per pt he was told he has testicular CA. Pending oncology consult.

## 2022-07-03 ENCOUNTER — APPOINTMENT (OUTPATIENT)
Dept: GENERAL RADIOLOGY | Age: 42
DRG: 723 | End: 2022-07-03
Attending: FAMILY MEDICINE
Payer: COMMERCIAL

## 2022-07-03 ENCOUNTER — APPOINTMENT (OUTPATIENT)
Dept: NON INVASIVE DIAGNOSTICS | Age: 42
DRG: 723 | End: 2022-07-03
Attending: FAMILY MEDICINE
Payer: COMMERCIAL

## 2022-07-03 LAB
ANION GAP SERPL CALC-SCNC: 6 MMOL/L (ref 7–16)
BUN SERPL-MCNC: 11 MG/DL (ref 6–23)
CALCIUM SERPL-MCNC: 8.9 MG/DL (ref 8.3–10.4)
CHLORIDE SERPL-SCNC: 108 MMOL/L (ref 98–107)
CO2 SERPL-SCNC: 25 MMOL/L (ref 21–32)
CREAT SERPL-MCNC: 1 MG/DL (ref 0.8–1.5)
ECHO AO ASC DIAM: 3.2 CM
ECHO AO ASCENDING AORTA INDEX: 1.33 CM/M2
ECHO AV AREA PEAK VELOCITY: 2.3 CM2
ECHO AV AREA VTI: 2.4 CM2
ECHO AV AREA/BSA PEAK VELOCITY: 1 CM2/M2
ECHO AV AREA/BSA VTI: 1 CM2/M2
ECHO AV CUSP MM: 2.2 CM
ECHO AV MEAN GRADIENT: 2 MMHG
ECHO AV MEAN VELOCITY: 0.7 M/S
ECHO AV PEAK GRADIENT: 5 MMHG
ECHO AV PEAK VELOCITY: 1.2 M/S
ECHO AV VELOCITY RATIO: 0.58
ECHO AV VTI: 22.2 CM
ECHO BSA: 2.44 M2
ECHO LA AREA 2C: 20.6 CM2
ECHO LA AREA 4C: 21.1 CM2
ECHO LA DIAMETER INDEX: 1.71 CM/M2
ECHO LA DIAMETER: 4.1 CM
ECHO LA MAJOR AXIS: 5.8 CM
ECHO LA MINOR AXIS: 6.1 CM
ECHO LA VOL BP: 60 ML (ref 18–58)
ECHO LA VOL/BSA BIPLANE: 25 ML/M2 (ref 16–34)
ECHO LV E' LATERAL VELOCITY: 11 CM/S
ECHO LV E' SEPTAL VELOCITY: 6 CM/S
ECHO LV EDV A4C: 119 ML
ECHO LV EDV INDEX A4C: 50 ML/M2
ECHO LV EJECTION FRACTION A4C: 61 %
ECHO LV ESV A4C: 46 ML
ECHO LV ESV INDEX A4C: 19 ML/M2
ECHO LV FRACTIONAL SHORTENING: 28 % (ref 28–44)
ECHO LV INTERNAL DIMENSION DIASTOLE INDEX: 2.21 CM/M2
ECHO LV INTERNAL DIMENSION DIASTOLIC: 5.3 CM (ref 4.2–5.9)
ECHO LV INTERNAL DIMENSION SYSTOLIC INDEX: 1.58 CM/M2
ECHO LV INTERNAL DIMENSION SYSTOLIC: 3.8 CM
ECHO LV IVSD: 1.3 CM (ref 0.6–1)
ECHO LV MASS 2D: 286.9 G (ref 88–224)
ECHO LV MASS INDEX 2D: 119.6 G/M2 (ref 49–115)
ECHO LV POSTERIOR WALL DIASTOLIC: 1.3 CM (ref 0.6–1)
ECHO LV RELATIVE WALL THICKNESS RATIO: 0.49
ECHO LVOT AREA: 3.8 CM2
ECHO LVOT AV VTI INDEX: 0.64
ECHO LVOT DIAM: 2.2 CM
ECHO LVOT MEAN GRADIENT: 1 MMHG
ECHO LVOT PEAK GRADIENT: 2 MMHG
ECHO LVOT PEAK VELOCITY: 0.7 M/S
ECHO LVOT STROKE VOLUME INDEX: 22.3 ML/M2
ECHO LVOT SV: 53.6 ML
ECHO LVOT VTI: 14.1 CM
ECHO MV A VELOCITY: 0.61 M/S
ECHO MV AREA VTI: 2.3 CM2
ECHO MV E DECELERATION TIME (DT): 204 MS
ECHO MV E VELOCITY: 0.67 M/S
ECHO MV E/A RATIO: 1.1
ECHO MV E/E' LATERAL: 6.09
ECHO MV E/E' RATIO (AVERAGED): 8.63
ECHO MV E/E' SEPTAL: 11.17
ECHO MV LVOT VTI INDEX: 1.67
ECHO MV MAX VELOCITY: 0.8 M/S
ECHO MV MEAN GRADIENT: 1 MMHG
ECHO MV MEAN VELOCITY: 0.4 M/S
ECHO MV PEAK GRADIENT: 2 MMHG
ECHO MV VTI: 23.6 CM
ECHO RV BASAL DIMENSION: 3.4 CM
ECHO RV INTERNAL DIMENSION: 2.9 CM
ECHO RV TAPSE: 3 CM (ref 1.7–?)
EST. AVERAGE GLUCOSE BLD GHB EST-MCNC: 103 MG/DL
GLUCOSE BLD STRIP.AUTO-MCNC: 103 MG/DL (ref 65–100)
GLUCOSE BLD STRIP.AUTO-MCNC: 96 MG/DL (ref 65–100)
GLUCOSE BLD STRIP.AUTO-MCNC: 96 MG/DL (ref 65–100)
GLUCOSE BLD STRIP.AUTO-MCNC: 97 MG/DL (ref 65–100)
GLUCOSE SERPL-MCNC: 98 MG/DL (ref 65–100)
HBA1C MFR BLD: 5.2 % (ref 4.8–5.6)
HCG SERPL QL: POSITIVE
HCG SERPL-ACNC: 184 MIU/ML (ref 0–2)
LDH SERPL L TO P-CCNC: 552 U/L (ref 100–190)
POTASSIUM SERPL-SCNC: 3.9 MMOL/L (ref 3.5–5.1)
SERVICE CMNT-IMP: ABNORMAL
SERVICE CMNT-IMP: NORMAL
SODIUM SERPL-SCNC: 139 MMOL/L (ref 138–145)

## 2022-07-03 PROCEDURE — 82962 GLUCOSE BLOOD TEST: CPT

## 2022-07-03 PROCEDURE — 6370000000 HC RX 637 (ALT 250 FOR IP): Performed by: FAMILY MEDICINE

## 2022-07-03 PROCEDURE — 74018 RADEX ABDOMEN 1 VIEW: CPT

## 2022-07-03 PROCEDURE — 80048 BASIC METABOLIC PNL TOTAL CA: CPT

## 2022-07-03 PROCEDURE — 99233 SBSQ HOSP IP/OBS HIGH 50: CPT | Performed by: INTERNAL MEDICINE

## 2022-07-03 PROCEDURE — 2580000003 HC RX 258: Performed by: FAMILY MEDICINE

## 2022-07-03 PROCEDURE — 93306 TTE W/DOPPLER COMPLETE: CPT

## 2022-07-03 PROCEDURE — 93306 TTE W/DOPPLER COMPLETE: CPT | Performed by: INTERNAL MEDICINE

## 2022-07-03 PROCEDURE — 6360000002 HC RX W HCPCS: Performed by: INTERNAL MEDICINE

## 2022-07-03 PROCEDURE — 84702 CHORIONIC GONADOTROPIN TEST: CPT

## 2022-07-03 PROCEDURE — 84703 CHORIONIC GONADOTROPIN ASSAY: CPT

## 2022-07-03 PROCEDURE — 36415 COLL VENOUS BLD VENIPUNCTURE: CPT

## 2022-07-03 PROCEDURE — 83036 HEMOGLOBIN GLYCOSYLATED A1C: CPT

## 2022-07-03 PROCEDURE — 83615 LACTATE (LD) (LDH) ENZYME: CPT

## 2022-07-03 PROCEDURE — 1100000000 HC RM PRIVATE

## 2022-07-03 RX ADMIN — ENOXAPARIN SODIUM 40 MG: 40 INJECTION SUBCUTANEOUS at 08:52

## 2022-07-03 RX ADMIN — ATORVASTATIN CALCIUM 20 MG: 20 TABLET, FILM COATED ORAL at 20:34

## 2022-07-03 RX ADMIN — SODIUM CHLORIDE, PRESERVATIVE FREE 10 ML: 5 INJECTION INTRAVENOUS at 08:56

## 2022-07-03 RX ADMIN — TELMISARTAN AND AMLODIPINE 1 TABLET: 5; 80 TABLET ORAL at 08:54

## 2022-07-03 RX ADMIN — ATENOLOL 50 MG: 25 TABLET ORAL at 08:52

## 2022-07-03 RX ADMIN — SODIUM CHLORIDE, PRESERVATIVE FREE 10 ML: 5 INJECTION INTRAVENOUS at 20:34

## 2022-07-03 RX ADMIN — SPIRONOLACTONE 50 MG: 25 TABLET ORAL at 08:51

## 2022-07-03 ASSESSMENT — PAIN - FUNCTIONAL ASSESSMENT: PAIN_FUNCTIONAL_ASSESSMENT: ACTIVITIES ARE NOT PREVENTED

## 2022-07-03 ASSESSMENT — PAIN DESCRIPTION - LOCATION: LOCATION: ABDOMEN

## 2022-07-03 ASSESSMENT — PAIN DESCRIPTION - ONSET: ONSET: SUDDEN

## 2022-07-03 ASSESSMENT — PAIN DESCRIPTION - DESCRIPTORS: DESCRIPTORS: STABBING;TENDER

## 2022-07-03 ASSESSMENT — PAIN DESCRIPTION - FREQUENCY: FREQUENCY: INTERMITTENT

## 2022-07-03 ASSESSMENT — PAIN SCALES - GENERAL
PAINLEVEL_OUTOF10: 0
PAINLEVEL_OUTOF10: 6

## 2022-07-03 ASSESSMENT — PAIN DESCRIPTION - ORIENTATION: ORIENTATION: RIGHT

## 2022-07-03 ASSESSMENT — PAIN DESCRIPTION - PAIN TYPE: TYPE: ACUTE PAIN

## 2022-07-03 NOTE — PROGRESS NOTES
Patient new c/o abd pain. Right sided tenderness. Called [facility]ma[facility] Soto, NP ordered KUB abd. Patient BP elevated spoke with NP may give 0.5mg Ativan once prn if pt desires. Patient does not want to take any pain medication at this time.

## 2022-07-03 NOTE — PROGRESS NOTES
Uneventful shift  PRN tylenol 650mg po given x's 1 for pt complaint of headache w/ resolution  Ambulated in [place] & outside w/ wife to  food    Shift report given to harshad Campo RN    Vitals:    07/02/22 1925 07/02/22 2148 07/02/22 2330 07/03/22 0313   BP: (!) 137/90  124/89 126/84   Pulse: 63  58 57   Resp: 12  12 12   Temp: 97.5 °F (36.4 °C)  97.6 °F (36.4 °C) 98.6 °F (37 °C)   TempSrc: Oral  Oral Oral   SpO2: 97%  96% 97%   Weight:  252 lb 9.6 oz (114.6 kg)     Height:

## 2022-07-03 NOTE — PROGRESS NOTES
Feeling of Stress : Not on file   Social Connections:     Frequency of Communication with Friends and Family: Not on file    Frequency of Social Gatherings with Friends and Family: Not on file    Attends Jew Services: Not on file    Active Member of Clubs or Organizations: Not on file    Attends Club or Organization Meetings: Not on file    Marital Status: Not on file   Intimate Partner Violence:     Fear of Current or Ex-Partner: Not on file    Emotionally Abused: Not on file    Physically Abused: Not on file    Sexually Abused: Not on file   Housing Stability:     Unable to Pay for Housing in the Last Year: Not on file    Number of [place] in the Last Year: Not on file    Unstable Housing in the Last Year: Not on file     Current Facility-Administered Medications   Medication Dose Route Frequency Provider Last Rate Last Admin    insulin lispro (HUMALOG) injection vial 0-8 Units  0-8 Units SubCUTAneous TID WC Indira Srivastava MD        insulin lispro (HUMALOG) injection vial 0-4 Units  0-4 Units SubCUTAneous Nightly Indira Srivastava MD        glucose chewable tablet 16 g  4 tablet Oral PRN Indira Srivastava MD        dextrose bolus 10% 125 mL  125 mL IntraVENous PRN Indira Srivastava MD        Or    dextrose bolus 10% 250 mL  250 mL IntraVENous PRN Indira Srivastava MD        glucagon injection 1 mg  1 mg IntraMUSCular PRN Indira Srivastava MD        dextrose 5 % solution  100 mL/hr IntraVENous PRN Indira Srivastava MD        diatrizoate meglumine-sodium (GASTROGRAFIN) 66-10 % solution 15 mL  15 mL Oral ONCE PRN Indira Srivastava MD   15 mL at 07/02/22 0958    telmisartan-amLODIPine (TWYNSTA) 80-5 MG tablet 1 tablet (Patient Supplied)  1 tablet Oral Daily Indira Srivastava MD   1 tablet at 07/02/22 1042    enoxaparin (LOVENOX) injection 40 mg  40 mg SubCUTAneous Daily Indira Srivastava MD   40 mg at 07/02/22 1147    atenolol (TENORMIN) tablet 50 mg 50 mg Oral Daily Lauren Moseley MD   50 mg at 22 1041    atorvastatin (LIPITOR) tablet 20 mg  20 mg Oral Nightly Lauren Moseley MD   20 mg at 22    spironolactone (ALDACTONE) tablet 50 mg  50 mg Oral Daily Lauren Moseley MD   50 mg at 22 1040    sodium chloride flush 0.9 % injection 5-40 mL  5-40 mL IntraVENous 2 times per day Lauren Moseley MD   10 mL at 22    sodium chloride flush 0.9 % injection 5-40 mL  5-40 mL IntraVENous PRN Lauren Moseley MD        0.9 % sodium chloride infusion   IntraVENous PRN Lauren Moseley MD        ondansetron (ZOFRAN-ODT) disintegrating tablet 4 mg  4 mg Oral Q8H PRN Lauren Moseley MD        Or    ondansetron [facility]) injection 4 mg  4 mg IntraVENous Q6H PRN Lauren Moseley MD        polyethylene glycol [facility]) packet 17 g  17 g Oral Daily PRN Lauren Moseley MD        acetaminophen (TYLENOL) tablet 650 mg  650 mg Oral Q6H PRN Lauren Moseley MD   650 mg at 22    Or    acetaminophen (TYLENOL) suppository 650 mg  650 mg Rectal Q6H PRN Lauren Moseley MD        HYDROcodone-acetaminophen (NORCO) 5-325 MG per tablet 1 tablet  1 tablet Oral Q6H PRN Lauren Moseley MD        morphine injection 1 mg  1 mg IntraVENous Q4H PRN Lauren Moseley MD           OBJECTIVE:  Patient Vitals for the past 8 hrs:   BP Temp Temp src Pulse Resp SpO2   22 0701 (!) 124/94 98.6 °F (37 °C) Oral 60 12 97 %   22 0313 126/84 98.6 °F (37 °C) Oral 57 12 97 %     Temp (24hrs), Av °F (36.7 °C), Min:97.5 °F (36.4 °C), Max:98.6 °F (37 °C)    No intake/output data recorded. Physical Exam:  Constitutional: Well developed, well nourished male in no acute distress, sitting comfortably in the hospital bed. HEENT: Normocephalic and atraumatic. Oropharynx is clear, mucous membranes are moist.  Pupils are equal, round, and reactive to light. Extraocular muscles are intact. Sclerae anicteric. Neck supple without JVD.  No thyromegaly present. Lymph node   No palpable submandibular, cervical, supraclavicular, axillary or inguinal lymph nodes. Skin Warm and dry. No bruising and no rash noted. No erythema. No pallor. Respiratory Lungs are clear to auscultation bilaterally without wheezes, rales or rhonchi, normal air exchange without accessory muscle use. CVS Normal rate, regular rhythm and normal S1 and S2. No murmurs, gallops, or rubs. Abdomen Soft, nontender and nondistended, normoactive bowel sounds. No palpable mass. No hepatosplenomegaly. Neuro Grossly nonfocal with no obvious sensory or motor deficits. MSK Normal range of motion in general.  No edema and no tenderness. Psych Appropriate mood and affect. Labs:    Recent Results (from the past 24 hour(s))   POCT Glucose    Collection Time: 07/02/22 11:48 AM   Result Value Ref Range    POC Glucose 119 (H) 65 - 100 mg/dL    Performed by:  Dru    POCT Glucose    Collection Time: 07/02/22  3:15 PM   Result Value Ref Range    POC Glucose 141 (H) 65 - 100 mg/dL    Performed by: Herve    POCT Glucose    Collection Time: 07/02/22  9:16 PM   Result Value Ref Range    POC Glucose 123 (H) 65 - 100 mg/dL    Performed by: Estephania    Basic Metabolic Panel w/ Reflex to MG    Collection Time: 07/03/22  6:28 AM   Result Value Ref Range    Sodium 139 138 - 145 mmol/L    Potassium 3.9 3.5 - 5.1 mmol/L    Chloride 108 (H) 98 - 107 mmol/L    CO2 25 21 - 32 mmol/L    Anion Gap 6 (L) 7 - 16 mmol/L    Glucose 98 65 - 100 mg/dL    BUN 11 6 - 23 MG/DL    CREATININE 1.00 0.8 - 1.5 MG/DL    GFR African American >60 >60 ml/min/1.73m2    GFR Non- >60 >60 ml/min/1.73m2    Calcium 8.9 8.3 - 10.4 MG/DL   POCT Glucose    Collection Time: 07/03/22  7:03 AM   Result Value Ref Range    POC Glucose 103 (H) 65 - 100 mg/dL    Performed by: Fortunato        Imaging:  CT of the Chest, Abdomen, and Pelvis       INDICATION: Low abdomen pain, testicular mass       Multiple axial images were obtained through the chest, abdomen, and pelvis. Oral contrast was used for bowel opacification.  100mL of Isovue 370 intravenous   contrast was used for better evaluation of solid organs and vascular structures.    Radiation dose reduction techniques were used for this study.  All CT scans   performed at this facility use one or all of the following: Automated exposure   control, adjustment of the mA and/or kVp according to patient's size, iterative   reconstruction.       COMPARISON: Noncontrast abdomen CT dated 07/01/2022       FINDINGS:   - LUNGS: No infiltrates, masses, or effusions.   - MEDIASTINUM/AXILLA: No significant adenopathy.   - HEART/VESSELS: Normal.   - CHEST WALL: Normal.       - LIVER: Diffuse fatty infiltration.  No significant liver mass.  Portal vein is   patent. - GALLBLADDER/BILE DUCTS: No gallstones or bile duct dilatation.   - PANCREAS: Normal.   - SPLEEN: Normal.       - ADRENALS:  Normal.   - KIDNEYS/URETERS: Mild bilateral prominence of the collecting systems, likely   due to ureteral compression.  No definite renal mass. - BLADDER: Normal.   - REPRODUCTIVE ORGANS: Asymmetric enhancement of the left testicle consistent   with history of a mass.       - BOWEL: Normal caliber.  No inflammatory changes.   - LYMPH NODES: There are multiple enlarged retroperitoneal lymph nodes with   associated compression of the inferior vena cava.  Largest node measures 8.2 x   6.9 cm.  Small retrocrural lymph nodes are also present.  No significant pelvic   adenopathy.   - BONES: No fracture or significant bone lesion.   - VASCULATURE: Normal   - OTHER: No ascites.           Impression   1.  Extensive retroperitoneal adenopathy.  Associated compression of the   inferior vena cava. 2.  Small retrocrural lymph nodes.    3.  No significant masses or adenopathy in the chest.   4.  Hypervascular left testicle consistent with history of saw, exammed and counselled the patient, and discussed with NP, agree with above history/assessment/plan. 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, he need tumor markers for further diagnosis, patient and wife are tearful and discussed that this was a potentially rather curable cancer, typically would pursue inguinal radical orchiectomy for diagnosis and local disease control, nonetheless he is a typical case radiographically if beta-hCG is highly elevated, and the severe narrowing of IVC is of clinical concern of complication unless chemotherapy is started soon, discussed with uroncology and may start chemo urgently if needed, discussed options with patient and he agrees with starting chemotherapy as soon as possible to avoid further complication, still pending beta-hCG level, pending sperm banking, will work with pharmacy to start 3 cycles of BEP once that is achieved, continue above care, total care and discussion over 35 minutes. Nadia Alberto M.D.   [address]  Office : [phone]  Fax : [phone]

## 2022-07-03 NOTE — PROGRESS NOTES
Hospitalist Progress Note   Admit Date:  2022  9:24 PM   Name:  Thalia Suarez   Age:  38 y.o. Sex:  male  :  1980   MRN:  [id number]   Room:  506/01    Presenting Complaint: No chief complaint on file. Reason(s) for Admission: Testicular mass [N50.89]     Hospital Course & Interval History:   Thalia Suarez is a 38 y.o. male with medical history of HTN, HLD, DM2 who presented to ED with testicular swelling and lower back and abdominal pain. Symptoms of pain started about 1 month ago, but he first noticed the testicular swelling early this week. Scrotal US showed \"Large left-sided testicular mass highly concerning for testicular malignancy measuring 4.4 x 3.7 x 3.9 cm. \"  CTAP showed \"Massive retroperitoneal lymph node conglomerates and enlarged retrocrural  lymph node suspicious for lymphoma versus metastatic disease. There is severe luminal narrowing of the IVC given mass effect upon the IVC and mass effect upon the abdominal aorta as well as the iliopsoas musculature \"    Subjective/24hr Events (22): Resting comfortably. Denies complaints. No pain, fevers      Assessment & Plan:       Testicular mass  -oncology planning to start chemo per nursing  -CT CAP reviewed      Hypertension, essential  -Controlled. Continue home meds      Hyperlipidemia  -Controlled. 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 *      Objective:     Patient Vitals for the past 24 hrs:   Temp Pulse Resp BP SpO2   07/03/22 1117 97.3 °F (36.3 °C) 60 12 (!) 147/103 99 %   07/03/22 0701 98.6 °F (37 °C) 60 12 (!) 124/94 97 %   07/03/22 0313 98.6 °F (37 °C) 57 12 126/84 97 %   07/02/22 2330 97.6 °F (36.4 °C) 58 12 124/89 96 %   07/02/22 1925 97.5 °F (36.4 °C) 63 12 (!) 137/90 97 %   07/02/22 1529 97.6 °F (36.4 °C) 64 16 (!) 129/95 98 %       Oxygen Therapy  SpO2: 99 %  Pulse Oximetry Type: Intermittent  Pulse Oximeter Device Mode: Intermittent  O2 Device: None (Room air)    Estimated body mass index is 32.43 kg/m² as calculated from the following:    Height as of this encounter: 6' 2\" (1.88 m). Weight as of this encounter: 252 lb 9.6 oz (114.6 kg). Intake/Output Summary (Last 24 hours) at 7/3/2022 1307  Last data filed at 7/3/2022 1305  Gross per 24 hour   Intake 480 ml   Output 1200 ml   Net -720 ml         Physical Exam:     Blood pressure (!) 147/103, pulse 60, temperature 97.3 °F (36.3 °C), temperature source Oral, resp. rate 12, height 6' 2\" (1.88 m), weight 252 lb 9.6 oz (114.6 kg), SpO2 99 %. General:    Well nourished. Head:  Normocephalic, atraumatic  Eyes:  Sclerae appear normal.  Pupils equally round. ENT:  Nares appear normal, no drainage. Moist oral mucosa  Neck:  No restricted ROM. Trachea midline   CV:   No jugular venous distension. Lungs:   Respirations even, unlabored  Abdomen:    nondistended. :  Scrotal swelling  Extremities: No cyanosis or clubbing. No edema  Skin:     No rashes and normal coloration. Warm and dry. Neuro:  CN II-XII grossly intact. Sensation intact  Psych:  Normal mood and affect.       I have reviewed ordered lab tests and independently visualized imaging below:    Recent Labs:  Recent Results (from the past 48 hour(s))   CMP    Collection Time: 07/01/22  7:38 PM   Result Value Ref Range    Sodium 139 136 - 145 mmol/L    Potassium 3.8 3.5 - 5.1 mmol/L    Chloride 107 98 - 107 mmol/L CO2 26 21 - 32 mmol/L    Anion Gap 6 (L) 7 - 16 mmol/L    Glucose 169 (H) 65 - 100 mg/dL    BUN 16 6 - 23 MG/DL    CREATININE 1.22 0.8 - 1.5 MG/DL    GFR African American >60 >60 ml/min/1.73m2    GFR Non- >60 >60 ml/min/1.73m2    Calcium 9.7 8.3 - 10.4 MG/DL    Total Bilirubin 0.6 0.2 - 1.1 MG/DL    ALT 57 12 - 65 U/L    AST 35 15 - 37 U/L    Alk Phosphatase 140 (H) 50 - 136 U/L    Total Protein 9.2 (H) 6.3 - 8.2 g/dL    Albumin 4.0 3.5 - 5.0 g/dL    Globulin 5.2 (H) 2.3 - 3.5 g/dL    Albumin/Globulin Ratio 0.8 (L) 1.2 - 3.5     CBC with Auto Differential    Collection Time: 07/01/22  7:38 PM   Result Value Ref Range    WBC 6.9 4.3 - 11.1 K/uL    RBC 5.20 4.23 - 5.6 M/uL    Hemoglobin 15.3 13.6 - 17.2 g/dL    Hematocrit 44.6 41.1 - 50.3 %    MCV 85.8 79.6 - 97.8 FL    MCH 29.4 26.1 - 32.9 PG    MCHC 34.3 31.4 - 35.0 g/dL    RDW 12.7 11.9 - 14.6 %    Platelets 176 161 - 312 K/uL    MPV 8.9 (L) 9.4 - 12.3 FL    nRBC 0.00 0.0 - 0.2 K/uL    Differential Type AUTOMATED      Seg Neutrophils 66 43 - 78 %    Lymphocytes 20 13 - 44 %    Monocytes 13 (H) 4.0 - 12.0 %    Eosinophils % 1 0.5 - 7.8 %    Basophils 0 0.0 - 2.0 %    Immature Granulocytes 0 0.0 - 5.0 %    Segs Absolute 4.5 1.7 - 8.2 K/UL    Absolute Lymph # 1.4 0.5 - 4.6 K/UL    Absolute Mono # 0.9 0.1 - 1.3 K/UL    Absolute Eos # 0.1 0.0 - 0.8 K/UL    Basophils Absolute 0.0 0.0 - 0.2 K/UL    Absolute Immature Granulocyte 0.0 0.0 - 0.5 K/UL   HCG Qualitative, Serum    Collection Time: 07/01/22  7:38 PM   Result Value Ref Range    HCG, Ql. Positive     AFP Tumor Marker    Collection Time: 07/01/22  7:38 PM   Result Value Ref Range    AFP-Tumor Marker 2.20 <8.0 ng/mL   Basic Metabolic Panel w/ Reflex to MG    Collection Time: 07/02/22  5:50 AM   Result Value Ref Range    Sodium 141 138 - 145 mmol/L    Potassium 3.9 3.5 - 5.1 mmol/L    Chloride 109 (H) 98 - 107 mmol/L    CO2 27 21 - 32 mmol/L    Anion Gap 5 (L) 7 - 16 mmol/L    Glucose 100 65 - 100 mg/dL    BUN 11 6 - 23 MG/DL    CREATININE 1.00 0.8 - 1.5 MG/DL    GFR African American >60 >60 ml/min/1.73m2    GFR Non- >60 >60 ml/min/1.73m2    Calcium 8.9 8.3 - 10.4 MG/DL   HCG Qualitative, Serum    Collection Time: 07/03/22  6:28 AM   Result Value Ref Range    HCG, Ql. Positive     POCT Glucose    Collection Time: 07/03/22  7:03 AM   Result Value Ref Range    POC Glucose 103 (H) 65 - 100 mg/dL    Performed by: Fortunato    POCT Glucose    Collection Time: 07/03/22 11:19 AM   Result Value Ref Range    POC Glucose 96 65 - 100 mg/dL    Performed by: Fortunato        Other Studies:  CT CHEST ABDOMEN PELVIS W CONTRAST   Final Result   1. Extensive retroperitoneal adenopathy. Associated compression of the   inferior vena cava. 2.  Small retrocrural lymph nodes. 3.  No significant masses or adenopathy in the chest.   4.  Hypervascular left testicle consistent with history of testicular mass.          If there are any questions about this report, I can be reached on [employer]   or at [phone]                Current Meds:  Current Facility-Administered Medications   Medication Dose Route Frequency    insulin lispro (HUMALOG) injection vial 0-8 Units  0-8 Units SubCUTAneous TID WC    insulin lispro (HUMALOG) injection vial 0-4 Units  0-4 Units SubCUTAneous Nightly    glucose chewable tablet 16 g  4 tablet Oral PRN    dextrose bolus 10% 125 mL  125 mL IntraVENous PRN    Or    dextrose bolus 10% 250 mL  250 mL IntraVENous PRN    glucagon injection 1 mg  1 mg IntraMUSCular PRN    dextrose 5 % solution  100 mL/hr IntraVENous PRN    diatrizoate meglumine-sodium (GASTROGRAFIN) 66-10 % solution 15 mL  15 mL Oral ONCE PRN    telmisartan-amLODIPine (TWYNSTA) 80-5 MG tablet 1 tablet (Patient Supplied)  1 tablet Oral Daily    enoxaparin (LOVENOX) injection 40 mg  40 mg SubCUTAneous Daily    atenolol (TENORMIN) tablet 50 mg  50 mg Oral Daily    atorvastatin (LIPITOR) tablet 20 mg  20 mg Oral Nightly    spironolactone (ALDACTONE) tablet 50 mg  50 mg Oral Daily    sodium chloride flush 0.9 % injection 5-40 mL  5-40 mL IntraVENous 2 times per day    sodium chloride flush 0.9 % injection 5-40 mL  5-40 mL IntraVENous PRN    0.9 % sodium chloride infusion   IntraVENous PRN    ondansetron (ZOFRAN-ODT) disintegrating tablet 4 mg  4 mg Oral Q8H PRN    Or    ondansetron (ZOFRAN) injection 4 mg  4 mg IntraVENous Q6H PRN    polyethylene glycol (GLYCOLAX) packet 17 g  17 g Oral Daily PRN    acetaminophen (TYLENOL) tablet 650 mg  650 mg Oral Q6H PRN    Or    acetaminophen (TYLENOL) suppository 650 mg  650 mg Rectal Q6H PRN    HYDROcodone-acetaminophen (NORCO) 5-325 MG per tablet 1 tablet  1 tablet Oral Q6H PRN    morphine injection 1 mg  1 mg IntraVENous Q4H PRN       Signed:  Ventura Champion MD    Part of this note may have been written by using a voice dictation software. The note has been proof read but may still contain some grammatical/other typographical errors.

## 2022-07-04 PROBLEM — C62.90 SEMINOMA (HCC): Status: ACTIVE | Noted: 2022-07-04

## 2022-07-04 LAB
ALBUMIN SERPL-MCNC: 3.5 G/DL (ref 3.5–5)
ALBUMIN/GLOB SERPL: 0.7 {RATIO} (ref 1.2–3.5)
ALP SERPL-CCNC: 114 U/L (ref 50–136)
ALT SERPL-CCNC: 51 U/L (ref 12–65)
ANION GAP SERPL CALC-SCNC: 7 MMOL/L (ref 7–16)
AST SERPL-CCNC: 28 U/L (ref 15–37)
BASOPHILS # BLD: 0 K/UL (ref 0–0.2)
BASOPHILS NFR BLD: 0 % (ref 0–2)
BILIRUB SERPL-MCNC: 0.7 MG/DL (ref 0.2–1.1)
BUN SERPL-MCNC: 14 MG/DL (ref 6–23)
CALCIUM SERPL-MCNC: 8.9 MG/DL (ref 8.3–10.4)
CHLORIDE SERPL-SCNC: 107 MMOL/L (ref 98–107)
CO2 SERPL-SCNC: 26 MMOL/L (ref 21–32)
CREAT SERPL-MCNC: 1.1 MG/DL (ref 0.8–1.5)
DIFFERENTIAL METHOD BLD: ABNORMAL
EOSINOPHIL # BLD: 0.1 K/UL (ref 0–0.8)
EOSINOPHIL NFR BLD: 1 % (ref 0.5–7.8)
ERYTHROCYTE [DISTWIDTH] IN BLOOD BY AUTOMATED COUNT: 12.5 % (ref 11.9–14.6)
GLOBULIN SER CALC-MCNC: 5 G/DL (ref 2.3–3.5)
GLUCOSE BLD STRIP.AUTO-MCNC: 100 MG/DL (ref 65–100)
GLUCOSE BLD STRIP.AUTO-MCNC: 111 MG/DL (ref 65–100)
GLUCOSE BLD STRIP.AUTO-MCNC: 150 MG/DL (ref 65–100)
GLUCOSE BLD STRIP.AUTO-MCNC: 96 MG/DL (ref 65–100)
GLUCOSE SERPL-MCNC: 93 MG/DL (ref 65–100)
HCT VFR BLD AUTO: 41.9 % (ref 41.1–50.3)
HGB BLD-MCNC: 14.4 G/DL (ref 13.6–17.2)
IMM GRANULOCYTES # BLD AUTO: 0 K/UL (ref 0–0.5)
IMM GRANULOCYTES NFR BLD AUTO: 0 % (ref 0–5)
LYMPHOCYTES # BLD: 0.8 K/UL (ref 0.5–4.6)
LYMPHOCYTES NFR BLD: 14 % (ref 13–44)
MAGNESIUM SERPL-MCNC: 2.1 MG/DL (ref 1.8–2.4)
MCH RBC QN AUTO: 29.5 PG (ref 26.1–32.9)
MCHC RBC AUTO-ENTMCNC: 34.4 G/DL (ref 31.4–35)
MCV RBC AUTO: 85.9 FL (ref 79.6–97.8)
MONOCYTES # BLD: 0.9 K/UL (ref 0.1–1.3)
MONOCYTES NFR BLD: 15 % (ref 4–12)
NEUTS SEG # BLD: 4.1 K/UL (ref 1.7–8.2)
NEUTS SEG NFR BLD: 70 % (ref 43–78)
NRBC # BLD: 0 K/UL (ref 0–0.2)
PLATELET # BLD AUTO: 263 K/UL (ref 150–450)
PMV BLD AUTO: 9.2 FL (ref 9.4–12.3)
POTASSIUM SERPL-SCNC: 3.5 MMOL/L (ref 3.5–5.1)
PROT SERPL-MCNC: 8.5 G/DL (ref 6.3–8.2)
RBC # BLD AUTO: 4.88 M/UL (ref 4.23–5.6)
SERVICE CMNT-IMP: ABNORMAL
SERVICE CMNT-IMP: ABNORMAL
SERVICE CMNT-IMP: NORMAL
SERVICE CMNT-IMP: NORMAL
SODIUM SERPL-SCNC: 140 MMOL/L (ref 138–145)
WBC # BLD AUTO: 5.9 K/UL (ref 4.3–11.1)

## 2022-07-04 PROCEDURE — 99233 SBSQ HOSP IP/OBS HIGH 50: CPT | Performed by: INTERNAL MEDICINE

## 2022-07-04 PROCEDURE — 99223 1ST HOSP IP/OBS HIGH 75: CPT | Performed by: UROLOGY

## 2022-07-04 PROCEDURE — 2580000003 HC RX 258: Performed by: FAMILY MEDICINE

## 2022-07-04 PROCEDURE — 80053 COMPREHEN METABOLIC PANEL: CPT

## 2022-07-04 PROCEDURE — 6360000002 HC RX W HCPCS: Performed by: INTERNAL MEDICINE

## 2022-07-04 PROCEDURE — 85025 COMPLETE CBC W/AUTO DIFF WBC: CPT

## 2022-07-04 PROCEDURE — 6370000000 HC RX 637 (ALT 250 FOR IP): Performed by: FAMILY MEDICINE

## 2022-07-04 PROCEDURE — 82962 GLUCOSE BLOOD TEST: CPT

## 2022-07-04 PROCEDURE — 36415 COLL VENOUS BLD VENIPUNCTURE: CPT

## 2022-07-04 PROCEDURE — 1100000000 HC RM PRIVATE

## 2022-07-04 PROCEDURE — 83735 ASSAY OF MAGNESIUM: CPT

## 2022-07-04 RX ORDER — ONDANSETRON 2 MG/ML
8 INJECTION INTRAMUSCULAR; INTRAVENOUS
Status: CANCELLED | OUTPATIENT
Start: 2022-07-27

## 2022-07-04 RX ORDER — SODIUM CHLORIDE 9 MG/ML
5-250 INJECTION, SOLUTION INTRAVENOUS PRN
Status: CANCELLED | OUTPATIENT
Start: 2022-08-02

## 2022-07-04 RX ORDER — ONDANSETRON 2 MG/ML
8 INJECTION INTRAMUSCULAR; INTRAVENOUS
Status: CANCELLED | OUTPATIENT
Start: 2022-08-15

## 2022-07-04 RX ORDER — ONDANSETRON 2 MG/ML
8 INJECTION INTRAMUSCULAR; INTRAVENOUS ONCE
Status: CANCELLED | OUTPATIENT
Start: 2022-07-26 | End: 2022-07-07

## 2022-07-04 RX ORDER — SODIUM CHLORIDE 9 MG/ML
INJECTION, SOLUTION INTRAVENOUS CONTINUOUS
Status: CANCELLED | OUTPATIENT
Start: 2022-08-02

## 2022-07-04 RX ORDER — MEPERIDINE HYDROCHLORIDE 25 MG/ML
12.5 INJECTION INTRAMUSCULAR; INTRAVENOUS; SUBCUTANEOUS PRN
Status: CANCELLED | OUTPATIENT
Start: 2022-08-02

## 2022-07-04 RX ORDER — SODIUM CHLORIDE 9 MG/ML
INJECTION, SOLUTION INTRAVENOUS CONTINUOUS
Status: CANCELLED | OUTPATIENT
Start: 2022-08-15

## 2022-07-04 RX ORDER — EPINEPHRINE 1 MG/ML
0.3 INJECTION, SOLUTION, CONCENTRATE INTRAVENOUS PRN
Status: CANCELLED | OUTPATIENT
Start: 2022-07-27

## 2022-07-04 RX ORDER — SODIUM CHLORIDE 9 MG/ML
5-250 INJECTION, SOLUTION INTRAVENOUS PRN
Status: CANCELLED | OUTPATIENT
Start: 2022-08-15

## 2022-07-04 RX ORDER — SODIUM CHLORIDE 0.9 % (FLUSH) 0.9 %
5-40 SYRINGE (ML) INJECTION PRN
Status: CANCELLED | OUTPATIENT
Start: 2022-08-02

## 2022-07-04 RX ORDER — ALBUTEROL SULFATE 90 UG/1
4 AEROSOL, METERED RESPIRATORY (INHALATION) PRN
Status: CANCELLED | OUTPATIENT
Start: 2022-07-29

## 2022-07-04 RX ORDER — ACETAMINOPHEN 325 MG/1
650 TABLET ORAL
Status: CANCELLED | OUTPATIENT
Start: 2022-07-26

## 2022-07-04 RX ORDER — ACETAMINOPHEN 325 MG/1
650 TABLET ORAL ONCE
Status: CANCELLED | OUTPATIENT
Start: 2022-08-02 | End: 2022-07-13

## 2022-07-04 RX ORDER — SODIUM CHLORIDE 0.9 % (FLUSH) 0.9 %
5-40 SYRINGE (ML) INJECTION PRN
Status: CANCELLED | OUTPATIENT
Start: 2022-07-28

## 2022-07-04 RX ORDER — DIPHENHYDRAMINE HYDROCHLORIDE 50 MG/ML
50 INJECTION INTRAMUSCULAR; INTRAVENOUS
Status: CANCELLED | OUTPATIENT
Start: 2022-07-26

## 2022-07-04 RX ORDER — SODIUM CHLORIDE 9 MG/ML
5-250 INJECTION, SOLUTION INTRAVENOUS PRN
Status: CANCELLED | OUTPATIENT
Start: 2022-07-25

## 2022-07-04 RX ORDER — SODIUM CHLORIDE 9 MG/ML
5-250 INJECTION, SOLUTION INTRAVENOUS PRN
Status: CANCELLED | OUTPATIENT
Start: 2022-07-28

## 2022-07-04 RX ORDER — ALBUTEROL SULFATE 90 UG/1
4 AEROSOL, METERED RESPIRATORY (INHALATION) PRN
Status: CANCELLED | OUTPATIENT
Start: 2022-08-15

## 2022-07-04 RX ORDER — HEPARIN SODIUM (PORCINE) LOCK FLUSH IV SOLN 100 UNIT/ML 100 UNIT/ML
500 SOLUTION INTRAVENOUS PRN
Status: CANCELLED | OUTPATIENT
Start: 2022-08-02

## 2022-07-04 RX ORDER — ACETAMINOPHEN 325 MG/1
650 TABLET ORAL
Status: CANCELLED | OUTPATIENT
Start: 2022-08-15

## 2022-07-04 RX ORDER — SODIUM CHLORIDE 9 MG/ML
5-250 INJECTION, SOLUTION INTRAVENOUS PRN
Status: CANCELLED | OUTPATIENT
Start: 2022-07-27

## 2022-07-04 RX ORDER — ONDANSETRON 2 MG/ML
8 INJECTION INTRAMUSCULAR; INTRAVENOUS ONCE
Status: CANCELLED | OUTPATIENT
Start: 2022-07-27 | End: 2022-07-08

## 2022-07-04 RX ORDER — DIPHENHYDRAMINE HYDROCHLORIDE 50 MG/ML
50 INJECTION INTRAMUSCULAR; INTRAVENOUS
Status: CANCELLED | OUTPATIENT
Start: 2022-08-15

## 2022-07-04 RX ORDER — SODIUM CHLORIDE 0.9 % (FLUSH) 0.9 %
5-40 SYRINGE (ML) INJECTION PRN
Status: CANCELLED | OUTPATIENT
Start: 2022-07-25

## 2022-07-04 RX ORDER — MEPERIDINE HYDROCHLORIDE 25 MG/ML
12.5 INJECTION INTRAMUSCULAR; INTRAVENOUS; SUBCUTANEOUS PRN
Status: CANCELLED | OUTPATIENT
Start: 2022-07-29

## 2022-07-04 RX ORDER — HEPARIN SODIUM (PORCINE) LOCK FLUSH IV SOLN 100 UNIT/ML 100 UNIT/ML
500 SOLUTION INTRAVENOUS PRN
Status: CANCELLED | OUTPATIENT
Start: 2022-07-26

## 2022-07-04 RX ORDER — SODIUM CHLORIDE 9 MG/ML
5-40 INJECTION INTRAVENOUS PRN
Status: CANCELLED | OUTPATIENT
Start: 2022-07-26

## 2022-07-04 RX ORDER — ALBUTEROL SULFATE 90 UG/1
4 AEROSOL, METERED RESPIRATORY (INHALATION) PRN
Status: CANCELLED | OUTPATIENT
Start: 2022-07-26

## 2022-07-04 RX ORDER — MEPERIDINE HYDROCHLORIDE 25 MG/ML
12.5 INJECTION INTRAMUSCULAR; INTRAVENOUS; SUBCUTANEOUS PRN
Status: CANCELLED | OUTPATIENT
Start: 2022-07-28

## 2022-07-04 RX ORDER — MEPERIDINE HYDROCHLORIDE 25 MG/ML
12.5 INJECTION INTRAMUSCULAR; INTRAVENOUS; SUBCUTANEOUS PRN
Status: CANCELLED | OUTPATIENT
Start: 2022-08-15

## 2022-07-04 RX ORDER — ONDANSETRON 2 MG/ML
8 INJECTION INTRAMUSCULAR; INTRAVENOUS
Status: CANCELLED | OUTPATIENT
Start: 2022-08-02

## 2022-07-04 RX ORDER — ONDANSETRON 2 MG/ML
8 INJECTION INTRAMUSCULAR; INTRAVENOUS ONCE
Status: CANCELLED | OUTPATIENT
Start: 2022-07-28 | End: 2022-07-09

## 2022-07-04 RX ORDER — SODIUM CHLORIDE 9 MG/ML
5-250 INJECTION, SOLUTION INTRAVENOUS PRN
Status: CANCELLED | OUTPATIENT
Start: 2022-07-29

## 2022-07-04 RX ORDER — SODIUM CHLORIDE 9 MG/ML
5-40 INJECTION INTRAVENOUS PRN
Status: CANCELLED | OUTPATIENT
Start: 2022-08-15

## 2022-07-04 RX ORDER — DIPHENHYDRAMINE HYDROCHLORIDE 50 MG/ML
50 INJECTION INTRAMUSCULAR; INTRAVENOUS
Status: CANCELLED | OUTPATIENT
Start: 2022-07-27

## 2022-07-04 RX ORDER — SODIUM CHLORIDE 9 MG/ML
INJECTION, SOLUTION INTRAVENOUS CONTINUOUS
Status: CANCELLED | OUTPATIENT
Start: 2022-07-29

## 2022-07-04 RX ORDER — HEPARIN SODIUM (PORCINE) LOCK FLUSH IV SOLN 100 UNIT/ML 100 UNIT/ML
500 SOLUTION INTRAVENOUS PRN
Status: CANCELLED | OUTPATIENT
Start: 2022-07-27

## 2022-07-04 RX ORDER — EPINEPHRINE 1 MG/ML
0.3 INJECTION, SOLUTION, CONCENTRATE INTRAVENOUS PRN
Status: CANCELLED | OUTPATIENT
Start: 2022-07-29

## 2022-07-04 RX ORDER — ACETAMINOPHEN 325 MG/1
650 TABLET ORAL
Status: CANCELLED | OUTPATIENT
Start: 2022-07-27

## 2022-07-04 RX ORDER — ACETAMINOPHEN 325 MG/1
650 TABLET ORAL
Status: CANCELLED | OUTPATIENT
Start: 2022-07-28

## 2022-07-04 RX ORDER — DIPHENHYDRAMINE HYDROCHLORIDE 50 MG/ML
25 INJECTION INTRAMUSCULAR; INTRAVENOUS ONCE
Status: CANCELLED | OUTPATIENT
Start: 2022-07-25 | End: 2022-07-06

## 2022-07-04 RX ORDER — ONDANSETRON 2 MG/ML
8 INJECTION INTRAMUSCULAR; INTRAVENOUS
Status: CANCELLED | OUTPATIENT
Start: 2022-07-25

## 2022-07-04 RX ORDER — SODIUM CHLORIDE 9 MG/ML
5-250 INJECTION, SOLUTION INTRAVENOUS PRN
Status: CANCELLED | OUTPATIENT
Start: 2022-07-26

## 2022-07-04 RX ORDER — EPINEPHRINE 1 MG/ML
0.3 INJECTION, SOLUTION, CONCENTRATE INTRAVENOUS PRN
Status: CANCELLED | OUTPATIENT
Start: 2022-07-25

## 2022-07-04 RX ORDER — ACETAMINOPHEN 325 MG/1
650 TABLET ORAL
Status: CANCELLED | OUTPATIENT
Start: 2022-08-02

## 2022-07-04 RX ORDER — HEPARIN SODIUM (PORCINE) LOCK FLUSH IV SOLN 100 UNIT/ML 100 UNIT/ML
500 SOLUTION INTRAVENOUS PRN
Status: CANCELLED | OUTPATIENT
Start: 2022-08-15

## 2022-07-04 RX ORDER — HEPARIN SODIUM (PORCINE) LOCK FLUSH IV SOLN 100 UNIT/ML 100 UNIT/ML
500 SOLUTION INTRAVENOUS PRN
Status: CANCELLED | OUTPATIENT
Start: 2022-07-29

## 2022-07-04 RX ORDER — SODIUM CHLORIDE 9 MG/ML
INJECTION, SOLUTION INTRAVENOUS CONTINUOUS
Status: CANCELLED | OUTPATIENT
Start: 2022-07-26

## 2022-07-04 RX ORDER — ALBUTEROL SULFATE 90 UG/1
4 AEROSOL, METERED RESPIRATORY (INHALATION) PRN
Status: CANCELLED | OUTPATIENT
Start: 2022-07-27

## 2022-07-04 RX ORDER — DIPHENHYDRAMINE HYDROCHLORIDE 50 MG/ML
50 INJECTION INTRAMUSCULAR; INTRAVENOUS
Status: CANCELLED | OUTPATIENT
Start: 2022-08-02

## 2022-07-04 RX ORDER — ALBUTEROL SULFATE 90 UG/1
4 AEROSOL, METERED RESPIRATORY (INHALATION) PRN
Status: CANCELLED | OUTPATIENT
Start: 2022-08-02

## 2022-07-04 RX ORDER — SODIUM CHLORIDE 9 MG/ML
5-40 INJECTION INTRAVENOUS PRN
Status: CANCELLED | OUTPATIENT
Start: 2022-07-29

## 2022-07-04 RX ORDER — HEPARIN SODIUM (PORCINE) LOCK FLUSH IV SOLN 100 UNIT/ML 100 UNIT/ML
500 SOLUTION INTRAVENOUS PRN
Status: CANCELLED | OUTPATIENT
Start: 2022-07-25

## 2022-07-04 RX ORDER — ACETAMINOPHEN 325 MG/1
650 TABLET ORAL
Status: CANCELLED | OUTPATIENT
Start: 2022-07-29

## 2022-07-04 RX ORDER — SODIUM CHLORIDE 9 MG/ML
INJECTION, SOLUTION INTRAVENOUS CONTINUOUS
Status: CANCELLED | OUTPATIENT
Start: 2022-07-27

## 2022-07-04 RX ORDER — SODIUM CHLORIDE 9 MG/ML
INJECTION, SOLUTION INTRAVENOUS CONTINUOUS
Status: CANCELLED | OUTPATIENT
Start: 2022-07-25

## 2022-07-04 RX ORDER — HEPARIN SODIUM (PORCINE) LOCK FLUSH IV SOLN 100 UNIT/ML 100 UNIT/ML
500 SOLUTION INTRAVENOUS PRN
Status: CANCELLED | OUTPATIENT
Start: 2022-07-28

## 2022-07-04 RX ORDER — DIPHENHYDRAMINE HYDROCHLORIDE 50 MG/ML
25 INJECTION INTRAMUSCULAR; INTRAVENOUS ONCE
Status: CANCELLED | OUTPATIENT
Start: 2022-08-15 | End: 2022-07-20

## 2022-07-04 RX ORDER — EPINEPHRINE 1 MG/ML
0.3 INJECTION, SOLUTION, CONCENTRATE INTRAVENOUS PRN
Status: CANCELLED | OUTPATIENT
Start: 2022-08-15

## 2022-07-04 RX ORDER — ONDANSETRON 2 MG/ML
8 INJECTION INTRAMUSCULAR; INTRAVENOUS
Status: CANCELLED | OUTPATIENT
Start: 2022-07-29

## 2022-07-04 RX ORDER — DIPHENHYDRAMINE HYDROCHLORIDE 50 MG/ML
25 INJECTION INTRAMUSCULAR; INTRAVENOUS ONCE
Status: CANCELLED | OUTPATIENT
Start: 2022-08-02 | End: 2022-07-13

## 2022-07-04 RX ORDER — ONDANSETRON 2 MG/ML
8 INJECTION INTRAMUSCULAR; INTRAVENOUS ONCE
Status: CANCELLED | OUTPATIENT
Start: 2022-07-25 | End: 2022-07-06

## 2022-07-04 RX ORDER — DIPHENHYDRAMINE HYDROCHLORIDE 50 MG/ML
50 INJECTION INTRAMUSCULAR; INTRAVENOUS
Status: CANCELLED | OUTPATIENT
Start: 2022-07-28

## 2022-07-04 RX ORDER — ONDANSETRON 2 MG/ML
8 INJECTION INTRAMUSCULAR; INTRAVENOUS
Status: CANCELLED | OUTPATIENT
Start: 2022-07-26

## 2022-07-04 RX ORDER — ACETAMINOPHEN 325 MG/1
650 TABLET ORAL ONCE
Status: CANCELLED | OUTPATIENT
Start: 2022-08-15 | End: 2022-07-20

## 2022-07-04 RX ORDER — SODIUM CHLORIDE 9 MG/ML
5-40 INJECTION INTRAVENOUS PRN
Status: CANCELLED | OUTPATIENT
Start: 2022-07-25

## 2022-07-04 RX ORDER — EPINEPHRINE 1 MG/ML
0.3 INJECTION, SOLUTION, CONCENTRATE INTRAVENOUS PRN
Status: CANCELLED | OUTPATIENT
Start: 2022-07-28

## 2022-07-04 RX ORDER — EPINEPHRINE 1 MG/ML
0.3 INJECTION, SOLUTION, CONCENTRATE INTRAVENOUS PRN
Status: CANCELLED | OUTPATIENT
Start: 2022-08-02

## 2022-07-04 RX ORDER — SODIUM CHLORIDE 9 MG/ML
5-40 INJECTION INTRAVENOUS PRN
Status: CANCELLED | OUTPATIENT
Start: 2022-07-28

## 2022-07-04 RX ORDER — ACETAMINOPHEN 325 MG/1
650 TABLET ORAL
Status: CANCELLED | OUTPATIENT
Start: 2022-07-25

## 2022-07-04 RX ORDER — SODIUM CHLORIDE 9 MG/ML
5-40 INJECTION INTRAVENOUS PRN
Status: CANCELLED | OUTPATIENT
Start: 2022-08-02

## 2022-07-04 RX ORDER — EPINEPHRINE 1 MG/ML
0.3 INJECTION, SOLUTION, CONCENTRATE INTRAVENOUS PRN
Status: CANCELLED | OUTPATIENT
Start: 2022-07-26

## 2022-07-04 RX ORDER — SODIUM CHLORIDE 0.9 % (FLUSH) 0.9 %
5-40 SYRINGE (ML) INJECTION PRN
Status: CANCELLED | OUTPATIENT
Start: 2022-08-15

## 2022-07-04 RX ORDER — DIPHENHYDRAMINE HYDROCHLORIDE 50 MG/ML
50 INJECTION INTRAMUSCULAR; INTRAVENOUS
Status: CANCELLED | OUTPATIENT
Start: 2022-07-25

## 2022-07-04 RX ORDER — MEPERIDINE HYDROCHLORIDE 25 MG/ML
12.5 INJECTION INTRAMUSCULAR; INTRAVENOUS; SUBCUTANEOUS PRN
Status: CANCELLED | OUTPATIENT
Start: 2022-07-25

## 2022-07-04 RX ORDER — SODIUM CHLORIDE 9 MG/ML
5-40 INJECTION INTRAVENOUS PRN
Status: CANCELLED | OUTPATIENT
Start: 2022-07-27

## 2022-07-04 RX ORDER — MEPERIDINE HYDROCHLORIDE 25 MG/ML
12.5 INJECTION INTRAMUSCULAR; INTRAVENOUS; SUBCUTANEOUS PRN
Status: CANCELLED | OUTPATIENT
Start: 2022-07-27

## 2022-07-04 RX ORDER — ONDANSETRON 2 MG/ML
8 INJECTION INTRAMUSCULAR; INTRAVENOUS ONCE
Status: CANCELLED | OUTPATIENT
Start: 2022-07-29 | End: 2022-07-10

## 2022-07-04 RX ORDER — ALBUTEROL SULFATE 90 UG/1
4 AEROSOL, METERED RESPIRATORY (INHALATION) PRN
Status: CANCELLED | OUTPATIENT
Start: 2022-07-28

## 2022-07-04 RX ORDER — SODIUM CHLORIDE 0.9 % (FLUSH) 0.9 %
5-40 SYRINGE (ML) INJECTION PRN
Status: CANCELLED | OUTPATIENT
Start: 2022-07-29

## 2022-07-04 RX ORDER — MEPERIDINE HYDROCHLORIDE 25 MG/ML
12.5 INJECTION INTRAMUSCULAR; INTRAVENOUS; SUBCUTANEOUS PRN
Status: CANCELLED | OUTPATIENT
Start: 2022-07-26

## 2022-07-04 RX ORDER — ACETAMINOPHEN 325 MG/1
650 TABLET ORAL ONCE
Status: CANCELLED | OUTPATIENT
Start: 2022-07-25 | End: 2022-07-06

## 2022-07-04 RX ORDER — SODIUM CHLORIDE 0.9 % (FLUSH) 0.9 %
5-40 SYRINGE (ML) INJECTION PRN
Status: CANCELLED | OUTPATIENT
Start: 2022-07-27

## 2022-07-04 RX ORDER — SODIUM CHLORIDE 0.9 % (FLUSH) 0.9 %
5-40 SYRINGE (ML) INJECTION PRN
Status: CANCELLED | OUTPATIENT
Start: 2022-07-26

## 2022-07-04 RX ORDER — ONDANSETRON 2 MG/ML
8 INJECTION INTRAMUSCULAR; INTRAVENOUS
Status: CANCELLED | OUTPATIENT
Start: 2022-07-28

## 2022-07-04 RX ORDER — DIPHENHYDRAMINE HYDROCHLORIDE 50 MG/ML
50 INJECTION INTRAMUSCULAR; INTRAVENOUS
Status: CANCELLED | OUTPATIENT
Start: 2022-07-29

## 2022-07-04 RX ORDER — SODIUM CHLORIDE 9 MG/ML
INJECTION, SOLUTION INTRAVENOUS CONTINUOUS
Status: CANCELLED | OUTPATIENT
Start: 2022-07-28

## 2022-07-04 RX ORDER — ALBUTEROL SULFATE 90 UG/1
4 AEROSOL, METERED RESPIRATORY (INHALATION) PRN
Status: CANCELLED | OUTPATIENT
Start: 2022-07-25

## 2022-07-04 RX ADMIN — ATENOLOL 50 MG: 25 TABLET ORAL at 08:34

## 2022-07-04 RX ADMIN — ATORVASTATIN CALCIUM 20 MG: 20 TABLET, FILM COATED ORAL at 20:41

## 2022-07-04 RX ADMIN — SPIRONOLACTONE 50 MG: 25 TABLET ORAL at 08:34

## 2022-07-04 RX ADMIN — SODIUM CHLORIDE, PRESERVATIVE FREE 10 ML: 5 INJECTION INTRAVENOUS at 20:41

## 2022-07-04 RX ADMIN — SODIUM CHLORIDE, PRESERVATIVE FREE 10 ML: 5 INJECTION INTRAVENOUS at 08:35

## 2022-07-04 RX ADMIN — TELMISARTAN AND AMLODIPINE 1 TABLET: 5; 80 TABLET ORAL at 08:35

## 2022-07-04 ASSESSMENT — PAIN SCALES - GENERAL: PAINLEVEL_OUTOF10: 0

## 2022-07-04 NOTE — PROGRESS NOTES
END OF SHIFT SUMMARY:    Significant vitals this shift:  no  Significant labs this shift:  no  Tests performed this shift:  none  Orders to be followed up on:  PFT do we need pulm consult? Sperm banking? Still want lymoh node bx? Blood products given this shift:  none  Additional events this shift:   Hoping to start BEP wednesday    I/Os:  +/- this shift:   07/04 0701 - 07/04 1900  In: 476 [P.O.:476]  Out: 500 [Urine:500]  Occurrences this Shift:  Urine  BM  Emesis   I/O last 3 completed shifts:   In: [facility] [P.O.:720]  Out: [address] Arnie Pallas, RN

## 2022-07-04 NOTE — ONCOLOGY
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

## 2022-07-04 NOTE — CONSULTS
[address] Urology Consult Note                                           07/04/22     Patient: Tapan Singh  MRN: [id number]    Admission Date:  7/1/2022, 3  Admission Diagnosis: Testicular mass [N50.89]  Reason for Consult: Left Testicle mass     ASSESSMENT: 38 y.o.  male with likely metastatic L testicle cancer to the RP lymph nodes causing severe IVC and aorta compression. Urology consulted for management recommendations by Dr. Padmini Gil. PLAN:  -Agree with oncology plan to start urgent chemotherapy as vascular compression by lymphadenopathy is the most pressing issue  -Will ultimately need left radical orchiectomy and possibly even RPLND in the future (pending chemo response) but plan per oncology is to start chemotherapy first.  -Will notify Dr. Sebastian Marte, urologic oncology, of patient's admission, as  Oncology discussed case with him on Friday 7/1/22.   -Will follow. Call with questions. __________________________________________________________________________________    HPI:     Tapan Singh is a 38 y.o.  male who presented to the ED with complaints of lower back pain for the past 1 month and 1 week of L testicular swelling / pain. No fever/chills/weight loss. Scrotal US showed 4.4 cm L testicle mass concerning for malignancy. AFP normal.  Beta HcG elevated to 184     CT C/A/P showed massive RP lymphadenopathy with compression of the IVC and abdominal aorta concerning for metastatic disease. Imaging personally reviewed by me today. Oncology was consulted and case was discussed between Oncology NP and my partner, Dr. Sebastian Marte. Given the severe narrowing of IVC due to metastatic disease, the plan is to proceed with urgent chemotherapy (BEP) FIRST to try to shrink RP lymph nodes rather than first radical orchiectomy followed by interval BEP chemotherapy, which is done in more traditional cases of testicular cancer.   Urology is consulted for management recommendations. Past Medical History:  Past Medical History:   Diagnosis Date    Benign essential hypertension     Diabetes mellitus type 2, noninsulin dependent ([facility] [address]     Hyperlipidemia        Past Surgical History:  No past surgical history on file. Medications:  No current facility-administered medications on file prior to encounter. Current Outpatient Medications on File Prior to Encounter   Medication Sig Dispense Refill    telmisartan-amLODIPine (TWYNSTA) 80-5 MG TABS tablet Take 1 tablet by mouth daily      atenolol (TENORMIN) 50 MG tablet Take 50 mg by mouth daily      atorvastatin (LIPITOR) 20 MG tablet Take 20 mg by mouth nightly      metFORMIN (GLUCOPHAGE-XR) 500 MG extended release tablet Take 2 with breakfast and supper - Diabetes      spironolactone (ALDACTONE) 50 MG tablet Take 50 mg by mouth daily      predniSONE (DELTASONE) 20 MG tablet Take 2 tablets now then 2 every morning. Take with food.  (Patient not taking: Reported on 7/1/2022)      tiZANidine (ZANAFLEX) 4 MG tablet Take 4 mg by mouth nightly (Patient not taking: Reported on 7/1/2022)         Allergies:  No Known Allergies    Social History:  Social History     Socioeconomic History    Marital status:      Spouse name: Not on file    Number of children: Not on file    Years of education: Not on file    Highest education level: Not on file   Occupational History    Not on file   Tobacco Use    Smoking status: Never Smoker    Smokeless tobacco: Not on file   Vaping Use    Vaping Use: Never used   Substance and Sexual Activity    Alcohol use: Not on file    Drug use: Not on file    Sexual activity: Not on file   Other Topics Concern    Not on file   Social History Narrative    Not on file     Social Determinants of Health     Financial Resource Strain:     Difficulty of Paying Living Expenses: Not on file   Food Insecurity:     Worried About [address] in the Last Year: Not on file    Ran Out of Food in the Last Year: Not on file   Transportation Needs:     Lack of Transportation (Medical): Not on file    Lack of Transportation (Non-Medical): Not on file   Physical Activity:     Days of Exercise per Week: Not on file    Minutes of Exercise per Session: Not on file   Stress:     Feeling of Stress : Not on file   Social Connections:     Frequency of Communication with Friends and Family: Not on file    Frequency of Social Gatherings with Friends and Family: Not on file    Attends Protestant Services: Not on file    Active Member of [address] [place] [address] [email or web address] or Organizations: Not on file    Attends Club or Organization Meetings: Not on file    Marital Status: Not on file   Intimate Partner Violence:     Fear of Current or Ex-Partner: Not on file    Emotionally Abused: Not on file    Physically Abused: Not on file    Sexually Abused: Not on file   Housing Stability:     Unable to Pay for Housing in the Last Year: Not on file    Number of [place] in the Last Year: Not on file    Unstable Housing in the Last Year: Not on file       Family History:  No family history on file.     ROS:  Review of Systems - General ROS: negative for - chills, fatigue or fever  Respiratory ROS: no cough, shortness of breath, or wheezing  Cardiovascular ROS: no chest pain or dyspnea on exertion  Gastrointestinal ROS: positive for - abdominal pain  negative for - change in stools, constipation, diarrhea, gas/bloating, melena or nausea/vomiting  Genito-Urinary ROS: no dysuria, trouble voiding, or hematuria  Musculoskeletal ROS: negative for - muscular weakness    Vitals:  Vitals:    07/04/22 0735   BP: (!) 137/92   Pulse: 58   Resp: 16   Temp: 98 °F (36.7 °C)   SpO2: 98%       Intake/Output:    Intake/Output Summary (Last 24 hours) at 7/4/2022 0840  Last data filed at 7/4/2022 0839  Gross per 24 hour   Intake 716 ml   Output 900 ml   Net -184 ml        Physical Exam:   BP (!) 137/92   Pulse 58   Temp 98 °F (36.7 °C) (Oral)   Resp 16   Ht 6' 2\" (1.88 m)   Wt 252 lb 11.2 oz (114.6 kg)   SpO2 98%   BMI 32.44 kg/m²      GENERAL: No acute distress, Awake, Alert, Oriented X 3  HEENT: Trachea midline, No gross visual or auditory impairments  CARDIAC: regular rate and rhythm  CHEST AND LUNG: Easy work of breathing, clear to auscultation bilaterally, no cyanosis  ABDOMEN: soft,  Tender in RLQ at lovenox injection site, non-distended, positive bowel sounds, no organomegaly, no palpable masses, no guarding, no rebound tenderness  : Circumcised phallus without abnormality, L testicle enlarged / firm compared to right concerning for malignancy. Testicles descended in scrotum bilaterally, R testicle without palpable mass/nodule, non-tender, no edema, no skin erythema, vas deferens palpable bilaterally, no hydrocele, no inguinal hernias, no inguinal lymphadenopathy  SKIN: No rash, no erythema, no lacerations or abrasions, no ecchymosis  NEUROLOGIC: cranial nerves 2-12 grossly intact       Lab/Radiology/Other Diagnostic Tests:  Recent Labs     07/01/22  1938 07/02/22  0550 07/03/22  0628 07/04/22  0614   HGB 15.3 14.3  --  14.4   HCT 44.6 42.1  --  41.9   WBC 6.9 6.6  --  5.9    141 139 140   K 3.8 3.9 3.9 3.5    109* 108* 107   CO2 26 27 25 26   BUN 16 13 11 14   MG  --   --   --  2.1   ALBUMIN 0.8*  --   --  0.7*   AST 35  --   --  28   ALT 57  --   --  51   ALKPHOS 140*  --   --  114     CT C/A/P: 7/2/2022    1.  Extensive retroperitoneal adenopathy.  Associated compression of the   inferior vena cava. 2.  Small retrocrural lymph nodes. 3.  No significant masses or adenopathy in the chest.   4.  Hypervascular left testicle consistent with history of testicular mass.         Scrotal US: 7/1/2022    1. [facility] left-sided testicular mass highly concerning for testicular malignancy   measuring 4.4 x 3.7 x 3.9 cm. Retroperitoneal lymphadenopathy on abdominal CT is   highly suspicious for metastatic disease. 2. Left-sided varicocele which increases with Valsalva. 3. Small bilateral hydroceles. Chele Marte M.D.     [facility]  [facility], [address]  Phone: [phone]  Fax: [phone]

## 2022-07-04 NOTE — CARE COORDINATION
Chart screened by CM for d/c planning. Pt resides with his spouse Salina Ashby [phone]. He has insurance with pharmacy benefits and a PCP. CM updated PCP in pt's chart. There have been no PT, OT, or SLP consults ordered. Pt is independent with ADLs at baseline. Pt admitted with Dx of Testicular mass, HTN, HLD, and DM-II. Per oncology progress note dated 7/4 pt plans to start chemo this week. Anticipated d/c plan is for pt to return home with spouse when medically stable. CM will continue to follow and remain available if any needs arise. 07/04/22 1324   Service Assessment   Patient Orientation Alert and Oriented;Person;Place; Self   Cognition Alert   History Provided By Medical Record   Primary Caregiver Self   Support Systems Spouse/Significant Other;Family Members   Patient's Healthcare Decision Maker is: Legal Next of [address] 71   PCP Verified by CM Yes  Llana Cowden., MD)   Last Visit to PCP Within last 3 months   Prior Functional Level Independent in ADLs/IADLs   Current Functional Level Independent in ADLs/IADLs   Can patient return to prior living arrangement Yes   Ability to make needs known: Good   Family able to assist with home care needs: Yes   Would you like for me to discuss the discharge plan with any other family members/significant others, and if so, who?  No   Social/Functional History   Lives With Spouse   Type of [address] One level   Bathroom Accessibility Accessible   Receives Help From Catalina Ashraf.   Ambulation Assistance Independent   Transfer Assistance Independent   Active  Yes   Mode of Transportation Car   Discharge Planning   Type of [address] Prior To Admission None   Potential Assistance Purchasing Medications No   Type of [address] None   Patient expects to be discharged to: House   One/Two Story Residence One story   History of falls? 0   Services At/After Discharge   Transition of Care Consult (CM Consult) Discharge [address] Provided? No   Mode of Transport at Discharge Other (see comment)  (Family)   Confirm Follow Up Transport Family   Condition of Participation: Discharge Planning   The Plan for Transition of Care is related to the following treatment goals: Pt to obtain care to become medically stable and to return with a safe transition. The Patient and/or Patient Representative was provided with a Choice of Provider? Patient   The Patient and/Or Patient Representative agree with the Discharge Plan? Yes   Freedom of Choice list was provided with basic dialogue that supports the patient's individualized plan of care/goals, treatment preferences, and shares the quality data associated with the providers?   Yes

## 2022-07-04 NOTE — PROGRESS NOTES
[facility]        Inpatient Hematology / Oncology Progress Note    Reason for Admission:  Testicular mass [N50.89]    24 Hour Events:  Noted pain over night, KUB was negative for acute findings  Plan to start chemo in the next few days  Afeb,VSS          ROS:  Constitutional: negative for fever, chills, weakness, malaise, fatigue. CV:  negative for chest pain, palpitations, edema. Respiratory:negative for dyspnea, cough, wheezing. GI: negative for nausea, abdominal pain, diarrhea. 10 point review of systems is otherwise negative with the exception of the elements mentioned above in the HPI. No Known Allergies  Past Medical History:   Diagnosis Date    Benign essential hypertension     Diabetes mellitus type 2, noninsulin dependent ([facility] [address]     Hyperlipidemia      No past surgical history on file. No family history on file. Social History     Socioeconomic History    Marital status:      Spouse name: Not on file    Number of children: Not on file    Years of education: Not on file    Highest education level: Not on file   Occupational History    Not on file   Tobacco Use    Smoking status: Never Smoker    Smokeless tobacco: Not on file   Vaping Use    Vaping Use: Never used   Substance and Sexual Activity    Alcohol use: Not on file    Drug use: Not on file    Sexual activity: Not on file   Other Topics Concern    Not on file   Social History Narrative    Not on file     Social Determinants of Health     Financial Resource Strain:     Difficulty of Paying Living Expenses: Not on file   Food Insecurity:     Worried About Running Out of Food in the Last Year: Not on file    Larry of Food in the Last Year: Not on file   Transportation Needs:     Lack of Transportation (Medical): Not on file    Lack of Transportation (Non-Medical):  Not on file   Physical Activity:     Days of Exercise per Week: Not on file    Minutes of Exercise per Session: Not on (TENORMIN) tablet 50 mg  50 mg Oral Daily Lauren Moseley MD   50 mg at 22    atorvastatin (LIPITOR) tablet 20 mg  20 mg Oral Nightly Lauren Moseley MD   20 mg at 22    spironolactone (ALDACTONE) tablet 50 mg  50 mg Oral Daily Lauren Moseley MD   50 mg at 22 0851    sodium chloride flush 0.9 % injection 5-40 mL  5-40 mL IntraVENous 2 times per day Lauren Moseley MD   10 mL at 22    sodium chloride flush 0.9 % injection 5-40 mL  5-40 mL IntraVENous PRN Lauren Moseley MD        0.9 % sodium chloride infusion   IntraVENous PRN Lauren Moseley MD        ondansetron (ZOFRAN-ODT) disintegrating tablet 4 mg  4 mg Oral Q8H PRN Lauren Moseley MD        Or    ondansetron [facility]) injection 4 mg  4 mg IntraVENous Q6H PRN Lauren Moseley MD        polyethylene glycol [facility]) packet 17 g  17 g Oral Daily PRN Lauren Moseley MD        acetaminophen (TYLENOL) tablet 650 mg  650 mg Oral Q6H PRN Lauren Moseley MD   650 mg at 22    Or    acetaminophen (TYLENOL) suppository 650 mg  650 mg Rectal Q6H PRN Lauren Moseley MD        HYDROcodone-acetaminophen (NORCO) 5-325 MG per tablet 1 tablet  1 tablet Oral Q6H PRN Lauren Moseley MD        morphine injection 1 mg  1 mg IntraVENous Q4H PRN Lauren Moseley MD           OBJECTIVE:  Patient Vitals for the past 8 hrs:   BP Temp Temp src Pulse Resp SpO2   22 0735 (!) 137/92 98 °F (36.7 °C) Oral 58 16 98 %   22 0330 (!) 133/92 97.5 °F (36.4 °C) Axillary 57 14 96 %     Temp (24hrs), Av.7 °F (36.5 °C), Min:97.3 °F (36.3 °C), Max:98 °F (36.7 °C)    No intake/output data recorded. Physical Exam:  Constitutional: Well developed, well nourished male in no acute distress, sitting comfortably in the hospital bed. HEENT: Normocephalic and atraumatic. Oropharynx is clear, mucous membranes are moist.  Pupils are equal, round, and reactive to light. Extraocular muscles are intact. Sclerae anicteric. Neck supple without JVD. No thyromegaly present. Lymph node   No palpable submandibular, cervical, supraclavicular, axillary or inguinal lymph nodes. Skin Warm and dry. No bruising and no rash noted. No erythema. No pallor. Respiratory Lungs are clear to auscultation bilaterally without wheezes, rales or rhonchi, normal air exchange without accessory muscle use. CVS Normal rate, regular rhythm and normal S1 and S2. No murmurs, gallops, or rubs. Abdomen Soft, nontender and nondistended, normoactive bowel sounds. No palpable mass. No hepatosplenomegaly. Neuro Grossly nonfocal with no obvious sensory or motor deficits. MSK Normal range of motion in general.  No edema and no tenderness. Psych Appropriate mood and affect.         Labs:    Recent Results (from the past 24 hour(s))   POCT Glucose    Collection Time: 07/03/22 11:19 AM   Result Value Ref Range    POC Glucose 96 65 - 100 mg/dL    Performed by: Fortunato    HCG, Quantitative, Pregnancy    Collection Time: 07/03/22  2:45 PM   Result Value Ref Range    hCG Quant 184 (H) 0.0 - 2.0 MIU/ML   Transthoracic echocardiogram (TTE) complete with contrast, bubble, strain, and 3D PRN    Collection Time: 07/03/22  3:15 PM   Result Value Ref Range    LA Minor Axis 6.1 cm    LA Major [place] 5.8 cm    LA Area 2C 20.6 cm2    LA Area 4C 21.1 cm2    LA Volume BP 60 (A) 18 - 58 mL    LA Diameter 4.1 cm    LV EDV A4C 119 mL    LV ESV A4C 46 mL    IVSd 1.3 (A) 0.6 - 1.0 cm    LVIDd 5.3 4.2 - 5.9 cm    LVIDs 3.8 cm    LVOT Diameter 2.2 cm    LVOT Mean Gradient 1 mmHg    LVOT VTI 14.1 cm    LVOT Peak Velocity 0.7 m/s    LVOT Peak Gradient 2 mmHg    LVPWd 1.3 (A) 0.6 - 1.0 cm    LV E' Lateral Velocity 11 cm/s    LV E' Septal Velocity 6 cm/s    LV Ejection Fraction A4C 61 %    LVOT Area 3.8 cm2    LVOT SV 53.6 ml    AV Cusp Mmode 2.2 cm    AV Mean Velocity 0.7 m/s    AV Mean Gradient 2 mmHg    AV VTI 22.2 cm    AV Peak Velocity 1.2 m/s    AV Peak Gradient 5 mmHg    AV Area by VTI 2.4 cm2    AV Area by Peak Velocity 2.3 cm2    Ascending Aorta 3.2 cm    MV E Wave Deceleration Time 204.0 ms    MV A Velocity 0.61 m/s    MV E Velocity 0.67 m/s    MV Mean Gradient 1 mmHg    MV VTI 23.6 cm    MV Mean Velocity 0.4 m/s    MV Max Velocity 0.8 m/s    MV Peak Gradient 2 mmHg    MV Area by VTI 2.3 cm2    RVIDd 2.9 cm    RV Basal Dimension 3.4 cm    TAPSE 3.0 1.7 cm    Body Surface Area 2.44 m2    Fractional Shortening 2D 28 28 - 44 %    LV ESV Index A4C 19 mL/m2    LV EDV Index A4C 50 mL/m2    LVIDd Index 2.21 cm/m2    LVIDs Index 1.58 cm/m2    LV RWT Ratio 0.49     LV Mass 2D 286.9 (A) 88 - 224 g    LV Mass 2D Index 119.6 (A) 49 - 115 g/m2    MV E/A 1.10     E/E' Ratio (Averaged) 8.63     E/E' Lateral 6.09     E/E' Septal 11.17     LA Volume Index BP 25 16 - 34 ml/m2    LVOT Stroke Volume Index 22.3 mL/m2    LA Size Index 1.71 cm/m2    Ascending Aorta Index 1.33 cm/m2    AV Velocity Ratio 0.58     LVOT:AV VTI Index 0.64     GLENIS/BSA VTI 1.0 cm2/m2    GLENIS/BSA Peak Velocity 1.0 cm2/m2    MV:LVOT VTI Index 1.67    POCT Glucose    Collection Time: 07/03/22  5:34 PM   Result Value Ref Range    POC Glucose 96 65 - 100 mg/dL    Performed by:  Dru    POCT Glucose    Collection Time: 07/03/22  9:46 PM   Result Value Ref Range    POC Glucose 97 65 - 100 mg/dL    Performed by: Betina Sharif    CBC with Auto Differential    Collection Time: 07/04/22  6:14 AM   Result Value Ref Range    WBC 5.9 4.3 - 11.1 K/uL    RBC 4.88 4.23 - 5.6 M/uL    Hemoglobin 14.4 13.6 - 17.2 g/dL    Hematocrit 41.9 41.1 - 50.3 %    MCV 85.9 79.6 - 97.8 FL    MCH 29.5 26.1 - 32.9 PG    MCHC 34.4 31.4 - 35.0 g/dL    RDW 12.5 11.9 - 14.6 %    Platelets 067 432 - 596 K/uL    MPV 9.2 (L) 9.4 - 12.3 FL    nRBC 0.00 0.0 - 0.2 K/uL    Differential Type AUTOMATED      Seg Neutrophils 70 43 - 78 %    Lymphocytes 14 13 - 44 %    Monocytes 15 (H) 4.0 - 12.0 %    Eosinophils % 1 0.5 - 7.8 %    Basophils 0 0.0 - 2.0 %    Immature Granulocytes 0 0.0 - 5.0 %    Segs Absolute 4.1 1.7 - 8.2 K/UL    Absolute Lymph # 0.8 0.5 - 4.6 K/UL    Absolute Mono # 0.9 0.1 - 1.3 K/UL    Absolute Eos # 0.1 0.0 - 0.8 K/UL    Basophils Absolute 0.0 0.0 - 0.2 K/UL    Absolute Immature Granulocyte 0.0 0.0 - 0.5 K/UL   Comprehensive Metabolic Panel w/ Reflex to MG    Collection Time: 07/04/22  6:14 AM   Result Value Ref Range    Sodium 140 138 - 145 mmol/L    Potassium 3.5 3.5 - 5.1 mmol/L    Chloride 107 98 - 107 mmol/L    CO2 26 21 - 32 mmol/L    Anion Gap 7 7 - 16 mmol/L    Glucose 93 65 - 100 mg/dL    BUN 14 6 - 23 MG/DL    CREATININE 1.10 0.8 - 1.5 MG/DL    GFR African American >60 >60 ml/min/1.73m2    GFR Non- >60 >60 ml/min/1.73m2    Calcium 8.9 8.3 - 10.4 MG/DL    Total Bilirubin 0.7 0.2 - 1.1 MG/DL    ALT 51 12 - 65 U/L    AST 28 15 - 37 U/L    Alk Phosphatase 114 50 - 136 U/L    Total Protein 8.5 (H) 6.3 - 8.2 g/dL    Albumin 3.5 3.5 - 5.0 g/dL    Globulin 5.0 (H) 2.3 - 3.5 g/dL    Albumin/Globulin Ratio 0.7 (L) 1.2 - 3.5     Magnesium    Collection Time: 07/04/22  6:14 AM   Result Value Ref Range    Magnesium 2.1 1.8 - 2.4 mg/dL   POCT Glucose    Collection Time: 07/04/22  7:37 AM   Result Value Ref Range    POC Glucose 100 65 - 100 mg/dL    Performed by: Renzo        Imaging:  CT of the Chest, Abdomen, and Pelvis       INDICATION: Low abdomen pain, testicular mass       Multiple axial images were obtained through the chest, abdomen, and pelvis.     Oral contrast was used for bowel opacification.  100mL of Isovue 370 intravenous   contrast was used for better evaluation of solid organs and vascular structures.    Radiation dose reduction techniques were used for this study.  All CT scans   performed at this facility use one or all of the following: Automated exposure   control, adjustment of the mA and/or kVp according to patient's size, iterative   reconstruction.       COMPARISON: Noncontrast abdomen CT dated 07/01/2022       FINDINGS:   - LUNGS: No infiltrates, masses, or effusions.   - MEDIASTINUM/AXILLA: No significant adenopathy.   - HEART/VESSELS: Normal.   - CHEST WALL: Normal.       - LIVER: Diffuse fatty infiltration.  No significant liver mass.  Portal vein is   patent. - GALLBLADDER/BILE DUCTS: No gallstones or bile duct dilatation.   - PANCREAS: Normal.   - SPLEEN: Normal.       - ADRENALS:  Normal.   - KIDNEYS/URETERS: Mild bilateral prominence of the collecting systems, likely   due to ureteral compression.  No definite renal mass. - BLADDER: Normal.   - REPRODUCTIVE ORGANS: Asymmetric enhancement of the left testicle consistent   with history of a mass.       - BOWEL: Normal caliber.  No inflammatory changes.   - LYMPH NODES: There are multiple enlarged retroperitoneal lymph nodes with   associated compression of the inferior vena cava.  Largest node measures 8.2 x   6.9 cm.  Small retrocrural lymph nodes are also present.  No significant pelvic   adenopathy.   - BONES: No fracture or significant bone lesion.   - VASCULATURE: Normal   - OTHER: No ascites.           Impression   1.  Extensive retroperitoneal adenopathy.  Associated compression of the   inferior vena cava. 2.  Small retrocrural lymph nodes. 3.  No significant masses or adenopathy in the chest.   4.  Hypervascular left testicle consistent with history of testicular mass.           If there are any questions about this report, I can be reached on [employer]   or at [phone]             He is a 38year old male with PMH of HTN, HLD, DM2 who presented to ED with testicular swelling and lower back and abdominal pain. He noted pain in his back about a month ago and had discussed with PCP, but no further work up had been completed. This week he noted testicular swelling so he presented to the ED for further evaluation.  Scrotal US showed \"Large left-sided testicular mass highly concerning for testicular malignancy measuring 4.4 x 3.7 x 3.9 cm.\"  CTAP showed \"Massive retroperitoneal lymph node conglomerates and enlarged retrocrural  lymph node suspicious for lymphoma versus metastatic disease. Eve Pleas is severe luminal narrowing of the IVC given mass effect upon the IVC and mass effect upon the abdominal aorta as well as the iliopsoas musculature. \" CT CAP with contrast pending. AFP and beta HCG pending. We are consulted for recommendations for this patient. PLAN:  Testicular mass  - Concerning for testicular cancer  - Check CT CAP for staging  - Check AFP and beta HCG. - Consult IR for biopsy   - Dr. Teofilo Atwood had long discussion with pt and wife about future plans and plans going forward regarding workup. 7/3 spoke with lab this AM and they are obtaining beta hcg level so this is pending. Hcg qualitative positive. CT CAP noted no signs of diease in the chest, known testicular mass, extensive retroperitoneal adenopathy and compression of the IVC. Plan to start chemo in the next few days. Will try to obtain PFTs if Able. Get echo. 7/4 PFTs ordered. Echo with normal EF (55-50%).  and LDH over 500. Will plan to discuss sperm banking with [facility] team tomorrow once they return. Patient also has be previously worked up by [facility]- can contact their office as well. Hopefully, start chemo once this has been completed. Goals and plan of care reviewed with the patient. All questions answered to the best of our ability. We will assume care of this patient. Ruma Lobo, [address] [facility]/Radiation Oncology   [address] Javi Moreira, [address] [facility]  Office : [phone]  Fax : [phone]   I personally saw, exammed and counselled the patient, and discussed with NP, agree with above history/assessment/plan.  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, he need tumor markers for further diagnosis, patient and wife are tearful and discussed that this was a potentially rather curable cancer, typically would pursue inguinal radical orchiectomy for diagnosis and local disease control, nonetheless he is a typical case radiographically if beta-hCG is highly elevated, and the severe narrowing of IVC is of clinical concern of complication unless chemotherapy is started soon, discussed with uroncology and may start chemo urgently if needed, discussed options with patient and he agrees with starting chemotherapy as soon as possible to avoid further complication, 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       So Kirk M.D.   [address]  Office : [phone]  Fax : [phone]

## 2022-07-05 VITALS
RESPIRATION RATE: 18 BRPM | HEART RATE: 52 BPM | OXYGEN SATURATION: 97 % | TEMPERATURE: 97.9 F | DIASTOLIC BLOOD PRESSURE: 98 MMHG | HEIGHT: 74 IN | BODY MASS INDEX: 32.89 KG/M2 | WEIGHT: 256.3 LBS | SYSTOLIC BLOOD PRESSURE: 139 MMHG

## 2022-07-05 DIAGNOSIS — N50.89 TESTICULAR MASS: Primary | ICD-10-CM

## 2022-07-05 LAB
ALBUMIN SERPL-MCNC: 3.5 G/DL (ref 3.5–5)
ALBUMIN/GLOB SERPL: 0.8 {RATIO} (ref 1.2–3.5)
ALP SERPL-CCNC: 114 U/L (ref 50–136)
ALT SERPL-CCNC: 56 U/L (ref 12–65)
ANION GAP SERPL CALC-SCNC: 5 MMOL/L (ref 7–16)
AST SERPL-CCNC: 36 U/L (ref 15–37)
BASOPHILS # BLD: 0 K/UL (ref 0–0.2)
BASOPHILS NFR BLD: 1 % (ref 0–2)
BILIRUB SERPL-MCNC: 0.7 MG/DL (ref 0.2–1.1)
BUN SERPL-MCNC: 13 MG/DL (ref 6–23)
CALCIUM SERPL-MCNC: 8.7 MG/DL (ref 8.3–10.4)
CHLORIDE SERPL-SCNC: 106 MMOL/L (ref 98–107)
CO2 SERPL-SCNC: 28 MMOL/L (ref 21–32)
CREAT SERPL-MCNC: 1.1 MG/DL (ref 0.8–1.5)
DIFFERENTIAL METHOD BLD: ABNORMAL
EOSINOPHIL # BLD: 0.1 K/UL (ref 0–0.8)
EOSINOPHIL NFR BLD: 1 % (ref 0.5–7.8)
ERYTHROCYTE [DISTWIDTH] IN BLOOD BY AUTOMATED COUNT: 12.7 % (ref 11.9–14.6)
GLOBULIN SER CALC-MCNC: 4.6 G/DL (ref 2.3–3.5)
GLUCOSE BLD STRIP.AUTO-MCNC: 100 MG/DL (ref 65–100)
GLUCOSE BLD STRIP.AUTO-MCNC: 90 MG/DL (ref 65–100)
GLUCOSE SERPL-MCNC: 99 MG/DL (ref 65–100)
HCT VFR BLD AUTO: 42.4 % (ref 41.1–50.3)
HGB BLD-MCNC: 14.6 G/DL (ref 13.6–17.2)
IMM GRANULOCYTES # BLD AUTO: 0 K/UL (ref 0–0.5)
IMM GRANULOCYTES NFR BLD AUTO: 1 % (ref 0–5)
LYMPHOCYTES # BLD: 0.9 K/UL (ref 0.5–4.6)
LYMPHOCYTES NFR BLD: 14 % (ref 13–44)
MCH RBC QN AUTO: 29.6 PG (ref 26.1–32.9)
MCHC RBC AUTO-ENTMCNC: 34.4 G/DL (ref 31.4–35)
MCV RBC AUTO: 86 FL (ref 79.6–97.8)
MONOCYTES # BLD: 0.9 K/UL (ref 0.1–1.3)
MONOCYTES NFR BLD: 14 % (ref 4–12)
NEUTS SEG # BLD: 4.2 K/UL (ref 1.7–8.2)
NEUTS SEG NFR BLD: 69 % (ref 43–78)
NRBC # BLD: 0 K/UL (ref 0–0.2)
PLATELET # BLD AUTO: 271 K/UL (ref 150–450)
PMV BLD AUTO: 9.1 FL (ref 9.4–12.3)
POTASSIUM SERPL-SCNC: 3.7 MMOL/L (ref 3.5–5.1)
PROT SERPL-MCNC: 8.1 G/DL (ref 6.3–8.2)
RBC # BLD AUTO: 4.93 M/UL (ref 4.23–5.6)
SERVICE CMNT-IMP: NORMAL
SERVICE CMNT-IMP: NORMAL
SODIUM SERPL-SCNC: 139 MMOL/L (ref 138–145)
WBC # BLD AUTO: 6 K/UL (ref 4.3–11.1)

## 2022-07-05 PROCEDURE — 36415 COLL VENOUS BLD VENIPUNCTURE: CPT

## 2022-07-05 PROCEDURE — 85025 COMPLETE CBC W/AUTO DIFF WBC: CPT

## 2022-07-05 PROCEDURE — APPSS180 APP SPLIT SHARED TIME > 60 MINUTES: Performed by: NURSE PRACTITIONER

## 2022-07-05 PROCEDURE — 99239 HOSP IP/OBS DSCHRG MGMT >30: CPT | Performed by: INTERNAL MEDICINE

## 2022-07-05 PROCEDURE — 6370000000 HC RX 637 (ALT 250 FOR IP): Performed by: FAMILY MEDICINE

## 2022-07-05 PROCEDURE — 2580000003 HC RX 258: Performed by: FAMILY MEDICINE

## 2022-07-05 PROCEDURE — 82962 GLUCOSE BLOOD TEST: CPT

## 2022-07-05 PROCEDURE — 80053 COMPREHEN METABOLIC PANEL: CPT

## 2022-07-05 RX ORDER — HYDROCODONE BITARTRATE AND ACETAMINOPHEN 5; 325 MG/1; MG/1
1 TABLET ORAL EVERY 6 HOURS PRN
Qty: 20 TABLET | Refills: 0 | Status: SHIPPED | OUTPATIENT
Start: 2022-07-05 | End: 2022-07-06

## 2022-07-05 RX ORDER — ONDANSETRON 4 MG/1
8 TABLET, ORALLY DISINTEGRATING ORAL EVERY 8 HOURS PRN
Qty: 90 TABLET | Refills: 0 | Status: SHIPPED | OUTPATIENT
Start: 2022-07-05 | End: 2022-07-06

## 2022-07-05 RX ADMIN — SODIUM CHLORIDE, PRESERVATIVE FREE 10 ML: 5 INJECTION INTRAVENOUS at 09:41

## 2022-07-05 RX ADMIN — SPIRONOLACTONE 50 MG: 25 TABLET ORAL at 09:37

## 2022-07-05 RX ADMIN — TELMISARTAN AND AMLODIPINE 1 TABLET: 5; 80 TABLET ORAL at 09:37

## 2022-07-05 RX ADMIN — ATENOLOL 50 MG: 25 TABLET ORAL at 09:37

## 2022-07-05 ASSESSMENT — PAIN SCALES - GENERAL: PAINLEVEL_OUTOF10: 0

## 2022-07-05 NOTE — CARE COORDINATION
[employer] Information Provided? No   Mode of Transport at Discharge Other (see comment)  (Family)   Confirm Follow Up Transport Family   Condition of Participation: Discharge Planning   The Plan for Transition of Care is related to the following treatment goals: Pt to obtain care to become medically stable and to return with a safe transition. The Patient and/or Patient Representative was provided with a Choice of Provider? Patient   The Patient and/Or Patient Representative agree with the Discharge Plan? Yes   Freedom of Choice list was provided with basic dialogue that supports the patient's individualized plan of care/goals, treatment preferences, and shares the quality data associated with the providers?   Yes

## 2022-07-05 NOTE — PROGRESS NOTES
[address] Urology Consult Note                                           07/05/22     Patient: Erika Chance  MRN: [id number]    Admission Date:  (Not on file), 0  Admission Diagnosis: Testicular mass [N50.89]  Reason for Consult: Testicular cancer  Consulting MD: Dr. Hinton Riding: [address] y.[address]. male with metastatic testicular cancer  [id number]-- likely stage IIC, good risk. His primary tumor appears to be in his left testicle and he has impressive retroperitoneal adenopathy. Importantly, his AFP is normal and his beta-hCG is 184. 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 Although he has impressive retroperitoneal adenopathy that is compressing his vena cava, he is currently not in any distress. He has no signs of vena cava occlusion. He has no significant abdominal or flank pain. I do not think we need to emergently start chemotherapy. Instead I recommend he sperm bank (he wants this prior to chemotherapy) and then move forward with left radical orchiectomy on Thursday (risks of the procedure were discussed with the patient). That will enable us to determine whether or not this is a pure seminoma or a mixed germ cell tumor. He can hopefully then start chemotherapy 10 or so days later. He will most likely need BEP x 3 or EP x 4. PLAN:  -Discharge home today for outpatient sperm banking today and tomorrow.  -Return Thursday morning for outpatient left radical orchiectomy  -He can then sperm bank more after the procedure and then hopefully start BEP within 1 to 2 weeks. __________________________________________________________________________________    HPI:     Erika Chance is a [address] y.[address] male he noticed some abdominal fullness and nonspecific back and flank pain over the last several months.   Last week he noticed his left testicle was enlarged and harder than it had been. He denies any cough or headache. He has not had any other systemic symptoms or weight loss. He underwent a CT scan that incidentally found large retroperitoneal adenopathy with compression of his IVC. He has had no lower extremity edema. He has no shortness of breath or chest pain. He had serum tumor markers checked that showed a beta-hCG of 184 and an AFP of 2. His creatinine is 1.1. He also had a scrotal ultrasound which shows a normal right testicle. His left testicle is is abnormal.  It is heterogeneous in appearance with an intratesticular solid mass involving the majority of the testicle measuring 4.3 x 3.7 x 3.9 cm. It is consistent with a primary germ cell tumor. He has no history of UDT. He has no family history of testicular malignancy. Past Medical History:  Past Medical History:   Diagnosis Date    Benign essential hypertension     Diabetes mellitus type 2, noninsulin dependent ([facility] [address]     Hyperlipidemia        Past Surgical History:  No past surgical history on file. Medications:  No current facility-administered medications on file prior to encounter. Current Outpatient Medications on File Prior to Encounter   Medication Sig Dispense Refill    HYDROcodone-acetaminophen (NORCO) 5-325 MG per tablet Take 1 tablet by mouth every 6 hours as needed for Pain for up to 5 days.  20 tablet 0    ondansetron (ZOFRAN-ODT) 4 MG disintegrating tablet Take 2 tablets by mouth every 8 hours as needed for Nausea or Vomiting 90 tablet 0    telmisartan-amLODIPine (TWYNSTA) 80-5 MG TABS tablet Take 1 tablet by mouth daily      atenolol (TENORMIN) 50 MG tablet Take 50 mg by mouth daily      atorvastatin (LIPITOR) 20 MG tablet Take 20 mg by mouth nightly      metFORMIN (GLUCOPHAGE-XR) 500 MG extended release tablet Take 2 with breakfast and supper - Diabetes      spironolactone (ALDACTONE) 50 MG tablet Take 50 mg by mouth daily      tiZANidine (ZANAFLEX) 4 MG tablet Take 4 mg by mouth nightly (Patient not taking: Reported on 7/1/2022)         Allergies:  No Known Allergies    Social History:  Social History     Socioeconomic History    Marital status:      Spouse name: Not on file    Number of children: Not on file    Years of education: Not on file    Highest education level: Not on file   Occupational History    Not on file   Tobacco Use    Smoking status: Never Smoker    Smokeless tobacco: Not on file   Vaping Use    Vaping Use: Never used   Substance and Sexual Activity    Alcohol use: Not on file    Drug use: Not on file    Sexual activity: Not on file   Other Topics Concern    Not on file   Social History Narrative    Not on file     Social Determinants of Health     Financial Resource Strain:     Difficulty of Paying Living Expenses: Not on file   Food Insecurity:     Worried About [address] in the Last Year: Not on file    Larry of Food in the Last Year: Not on file   Transportation Needs:     Lack of Transportation (Medical): Not on file    Lack of Transportation (Non-Medical):  Not on file   Physical Activity:     Days of Exercise per Week: Not on file    Minutes of Exercise per Session: Not on file   Stress:     Feeling of Stress : Not on file   Social Connections:     Frequency of Communication with Friends and Family: Not on file    Frequency of Social Gatherings with Friends and Family: Not on file    Attends Gnosticism Services: Not on file    Active Member of Clubs or Organizations: Not on file    Attends Club or Organization Meetings: Not on file    Marital Status: Not on file   Intimate Partner Violence:     Fear of Current or Ex-Partner: Not on file    Emotionally Abused: Not on file    Physically Abused: Not on file    Sexually Abused: Not on file   Housing Stability:     Unable to Pay for Housing in the Last Year: Not on file    Number of [place] in the Last Year: Not on file    Unstable Housing in the Last Year: Not on file       Family History:  No family history on file. ROS:  See above. Mild left back and flank pain. Fullness in lower abdomen. No lower urinary tract symptoms. No lower extremity edema. Vitals: There were no vitals filed for this visit. Intake/Output:  No intake or output data in the 24 hours ending 07/05/22 1650     Physical Exam:   There were no vitals taken for this visit. GENERAL: No acute distress, Awake, Alert, Oriented X 3  HEENT: Trachea midline, EOM intact  CARDIAC: regular rate and rhythm  CHEST AND LUNG: Easy work of breathing, clear to auscultation bilaterally, no cyanosis  ABDOMEN: soft, non tender, non-distended, no palpable masses, no guarding, no rebound tenderness; no palpable masses or flank tenderness  : Normal phallus without abnormality, right testicle is normal to palpation without mass or nodule. Left testicle was enlarged and globally firm, consistent with a testicular tumor. No hydrocele, no inguinal hernias, no inguinal lymphadenopathy  SKIN: No rash, no erythema, no lacerations or abrasions, no ecchymosis  NEUROLOGIC: cranial nerves 2-12 grossly intact     Lab/Radiology/Other Diagnostic Tests:  Recent Labs     07/03/22  0628 07/04/22  0614 07/05/22  0701   HGB  --  14.4 14.6   HCT  --  41.9 42.4   WBC  --  5.9 6.0    140 139   K 3.9 3.5 3.7   * 107 106   CO2 25 26 28   BUN 11 14 13   MG  --  2.1  --    ALBUMIN  --  0.7* 0.8*   AST  --  28 36   ALT  --  51 56   ALKPHOS  --  114 114         XR Results (maximum last 3):  === 07/01/22 ===    XR ABDOMEN (KUB) (SINGLE AP VIEW)    - Narrative -  Exam: Single view abdomen. Indication: Abdominal pain and tenderness. Comparison: Abdomen and pelvis CT dated July 02, 2021. FINDINGS:  Soft tissue density along the psoas musculature known to represent  retroperitoneal adenopathy. Nonobstructive bowel gas pattern. No evidence of  acute osseous abnormality.  No evidence of pneumatosis or pneumoperitoneum.    - Impression -  1. Non-obstructive bowel gas pattern. CT Results (maximum last 3):  === 07/01/22 ===    CT CHEST ABDOMEN PELVIS W CONTRAST    - Narrative -  CT of the Chest, Abdomen, and Pelvis    INDICATION: Low abdomen pain, testicular mass    Multiple axial images were obtained through the chest, abdomen, and pelvis. Oral contrast was used for bowel opacification. 100mL of [address] 370 intravenous  contrast was used for better evaluation of solid organs and vascular structures. Radiation dose reduction techniques were used for this study. All CT scans  performed at this facility use one or all of the following: Automated exposure  control, adjustment of the mA and/or kVp according to patient's size, iterative  reconstruction. COMPARISON: Noncontrast abdomen CT dated 07/01/2022    FINDINGS:  - LUNGS: No infiltrates, masses, or effusions.  - MEDIASTINUM/AXILLA: No significant adenopathy.  - HEART/VESSELS: Normal.  - CHEST WALL: Normal.    - LIVER: Diffuse fatty infiltration. No significant liver mass. Portal vein is  patent. - GALLBLADDER/BILE DUCTS: No gallstones or bile duct dilatation.  - PANCREAS: Normal.  - SPLEEN: Normal.    - ADRENALS:  Normal.  - KIDNEYS/URETERS: Mild bilateral prominence of the collecting systems, likely  due to ureteral compression. No definite renal mass. - BLADDER: Normal.  - REPRODUCTIVE ORGANS: Asymmetric enhancement of the left testicle consistent  with history of a mass.    - BOWEL: Normal caliber. No inflammatory changes.  - LYMPH NODES: There are multiple enlarged retroperitoneal lymph nodes with  associated compression of the inferior vena cava. Largest node measures 8.2 x  6.9 cm. Small retrocrural lymph nodes are also present. No significant pelvic  adenopathy.  - BONES: No fracture or significant bone lesion.  - VASCULATURE: Normal  - OTHER: No ascites. - Impression -  1. Extensive retroperitoneal adenopathy.   Associated compression of the  inferior vena cava. 2.  Small retrocrural lymph nodes. 3.  No significant masses or adenopathy in the chest.  4.  Hypervascular left testicle consistent with history of testicular mass.       If there are any questions about this report, I can be reached on [employer]  or at [phone]      Lisa Roach MD    [address]  Urology

## 2022-07-05 NOTE — PROGRESS NOTES
Pt discharged at 1415. All IVs removed. All discharge instructions, medications, and follow up appointments discussed with patient, patient verbalizes understanding. No complaints voiced by pt. Pt shows no s/sx of distress, active bleeding, or pain.

## 2022-07-05 NOTE — DISCHARGE SUMMARY
[address] [place] [address] Hematology & Oncology: Inpatient Hematology / Oncology Discharge Summary Note    Patient ID:  Eric Colin  [id number]  38 y.o.  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 *      Hospital Course:  Mr. Sandoval Mccann is a 38year old male with PMH of HTN, HLD, DM2 who presented to ED with testicular swelling and lower back and abdominal pain.  CT showed left testicular mass, bulky retroperitoneal lymphadenopathy, severe narrowing of IVC and mass-effect compressing on the aorta, AFP normal and beta-hCG 184. It was discussed with patient and wife this was 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, 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 and elevated HCG. Patient is not experiencing any symptoms of mass effect on the aorta. Dr. Jose Tate is following patient and after discussion today decision was made to discharge patient, have him go to [facility] for sperm banking today and then surgery Thursday or Friday then once recovered cycle one BEP.  Patient is aware of plan and is in agreement.          Consults:  IP CONSULT TO ONCOLOGY    Pertinent Diagnostic Studies:   Labs:    Recent Labs     07/04/22  0614 07/05/22  0701   WBC 5.9 6.0   HGB 14.4 14.6    271      Recent Labs     07/03/22  0628 07/04/22  0614 07/05/22  0701    140 139   K 3.9 3.5 3.7   * 107 106   CO2 25 26 28   BUN 11 14 13   MG  --  2.1  --         Medication List      START taking these medications    HYDROcodone-acetaminophen 5-325 MG per tablet  Commonly known as: NORCO  Take 1 tablet by mouth every 6 hours as needed for Pain for up to 5 days. ondansetron 4 MG disintegrating tablet  Commonly known as: ZOFRAN-ODT  Take 2 tablets by mouth every 8 hours as needed for Nausea or Vomiting        CONTINUE taking these medications    atenolol 50 MG tablet  Commonly known as: TENORMIN     atorvastatin 20 MG tablet  Commonly known as: LIPITOR     metFORMIN 500 MG extended release tablet  Commonly known as: GLUCOPHAGE-XR     spironolactone 50 MG tablet  Commonly known as: ALDACTONE     telmisartan-amLODIPine 80-5 MG Tabs tablet  Commonly known as: TWYNSTA     tiZANidine 4 MG tablet  Commonly known as: Madill Cover        STOP taking these medications    predniSONE 20 MG tablet  Commonly known as: Ceclia Lime           Where to Get Your Medications      These medications were sent to [facility]/pharmacy #[id number]- [facility], [address] [place] [address], [address] [place] [address]    Phone: [phone]   · HYDROcodone-acetaminophen 5-325 MG per tablet  · ondansetron 4 MG disintegrating tablet           OBJECTIVE:  Patient Vitals for the past 8 hrs:   BP Temp Temp src Pulse Resp SpO2   22 1220 (!) 139/98 -- -- 52 -- --   22 1135 (!) 145/106 97.9 °F (36.6 °C) Oral 59 18 97 %   22 0750 (!) 133/93 97.5 °F (36.4 °C) Oral 62 18 98 %     Temp (24hrs), Av.8 °F (36.6 °C), Min:97.5 °F (36.4 °C), Max:97.9 °F (36.6 °C)     0701 -  1900  In: 240 [P.O.:[address]]  Out: 325 [Urine:325]    Physical Exam:  Constitutional: Well developed, well nourished male in no acute distress, sitting comfortably in bed    HEENT: Normocephalic and atraumatic. Oropharynx is clear, mucous membranes are moist.  Pupils are equal, round, and reactive to light. Extraocular muscles are intact. Sclerae anicteric. Skin Warm and dry. No bruising and no rash noted. No erythema. No pallor.     Respiratory Lungs are clear to auscultation bilaterally without wheezes, rales or rhonchi, normal air exchange without accessory muscle use. CVS Normal rate, regular rhythm and normal S1 and S2. No murmurs, gallops, or rubs. Abdomen Soft, nontender and nondistended, normoactive bowel sounds. Neuro Grossly nonfocal with no obvious sensory or motor deficits. MSK Normal range of motion in general.  No edema and no tenderness. Psych Appropriate mood and affect. 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 *      DISPOSITION:  We will request follow up with Dr. Emanuel Thomas 2 weeks. I spent 42 minutes on the day of discharge in discharge planning and coordination of care for this patient. ROSEMARY Pedro NP  [facility] Hematology & Oncology  [address] [place] [address]  Office : [phone]  Fax : [phone]         Attending Addendum:  I have personally performed a face to face diagnostic evaluation on this patient. I have reviewed and agree with the care plan as documented by Lenin Crowell N.P. My findings are as follows:  He has Testicular Mass, appears anxious, heart rate regular without murmurs, abdomen is non-tender, bowel sounds are positive, we will arrange for him to undergo sperm banking and surgery, he also needs to follow-up in clinic with Dr. Emanuel Thomas.               Bola Andres MD    [facility] Hematology/Oncology  [address] [place] [address]  Office : [phone]  Fax : [phone]

## 2022-07-05 NOTE — PROGRESS NOTES
Staff alerted me of patient's new cancer diagnosis and I followed up. This was an initial 's visit with Mr. Lennie Fowler and his wife. Patient states his wife goes by [place]. I conveyed care and concern and offered spiritual interventions, including active listening, affirmation of danielle & emotions, and facilitating discussion on patient's processing of new diagnosis. Mr. Lennie Fowler believes he is processing his new diagnosis well and he has a goal to Elisha gonzáles. \" Mr. Lennie Fowler discussed the difficulty he would have with seeing his wife sad or crying, although he is realistic about this display of emotions happening. The couple seems to have limited local support with family members live away. The couple identify as [address] and attend a [employer]. There is no support from the [place] at this time. I affirmed the availability of  follow-up and I offered prayer as requested. The couple expressed gratitude for the visit.       Ethyl Basket, [address]  Board Certified

## 2022-07-06 ENCOUNTER — ANESTHESIA EVENT (OUTPATIENT)
Dept: SURGERY | Age: 42
End: 2022-07-06
Payer: COMMERCIAL

## 2022-07-06 NOTE — PERIOP NOTE
Directly informed patient and or family member of pre op arrival time 0 on 07/07/2022   All questions answered. Pre op instructions reviewed. Left contact information for any additional questions or needs.

## 2022-07-06 NOTE — PROGRESS NOTES
Patient verified name and . Order for consent found in EHR and matches case posting; patient verifies procedure. Type 1B surgery, phone assessment complete. Orders received. Labs per surgeon: NONE  Labs per anesthesia protocol: POC Glucose s/h dos    Instructed Patient that if blood sugar 300 or > , surgery may be cancelled. Pt voice understanding. SN instruct pt to contact [phone] for any complications. Patient answered medical/surgical history questions at their best of ability. All prior to admission medications documented in [facility]. Patient instructed to take the following medications the day of surgery according to anesthesia guidelines with a small sip of water: Atenolol. Hold all vitamins 7 days prior to surgery and NSAIDS 5 days prior to surgery. Prescription meds to hold: NONE. Patient instructed on the following:    > Arrive at MAIN Entrance, time of arrival to be called the day before by 1700  > NPO after midnight, unless otherwise indicated, including gum, mints, and ice chips  > Responsible adult must drive patient to the hospital, stay during surgery, and patient will need supervision 24 hours after anesthesia  > Use antibacterial soap in shower the night before surgery and on the morning of surgery  > All piercings must be removed prior to arrival.    > Leave all valuables (money and jewelry) at home but bring insurance card and ID on DOS.   > You may be required to pay a deductible or co-pay on the day of your procedure. You can pre-pay by calling [phone] if your surgery is at the [facility] or [phone] if your surgery is at the [facility]. > Do not wear make-up, nail polish, lotions, cologne, perfumes, powders, or oil on skin. Artificial nails are not permitted.

## 2022-07-07 ENCOUNTER — HOSPITAL ENCOUNTER (OUTPATIENT)
Age: 42
Setting detail: OUTPATIENT SURGERY
Discharge: HOME | End: 2022-07-07
Attending: UROLOGY | Admitting: UROLOGY
Payer: COMMERCIAL

## 2022-07-07 ENCOUNTER — ANESTHESIA (OUTPATIENT)
Dept: SURGERY | Age: 42
End: 2022-07-07
Payer: COMMERCIAL

## 2022-07-07 VITALS
DIASTOLIC BLOOD PRESSURE: 72 MMHG | SYSTOLIC BLOOD PRESSURE: 110 MMHG | HEIGHT: 74 IN | TEMPERATURE: 98 F | HEART RATE: 78 BPM | BODY MASS INDEX: 32.23 KG/M2 | OXYGEN SATURATION: 94 % | RESPIRATION RATE: 16 BRPM | WEIGHT: 251.13 LBS

## 2022-07-07 DIAGNOSIS — C62.12 CANCER OF DESCENDED LEFT TESTIS (HCC): Primary | ICD-10-CM

## 2022-07-07 DIAGNOSIS — N50.89 TESTICULAR MASS: Primary | ICD-10-CM

## 2022-07-07 LAB
GLUCOSE BLD STRIP.AUTO-MCNC: 121 MG/DL (ref 65–100)
GLUCOSE BLD STRIP.AUTO-MCNC: 97 MG/DL (ref 65–100)
SERVICE CMNT-IMP: ABNORMAL
SERVICE CMNT-IMP: NORMAL

## 2022-07-07 PROCEDURE — 2500000003 HC RX 250 WO HCPCS: Performed by: UROLOGY

## 2022-07-07 PROCEDURE — 3600000012 HC SURGERY LEVEL 2 ADDTL 15MIN: Performed by: UROLOGY

## 2022-07-07 PROCEDURE — 88309 TISSUE EXAM BY PATHOLOGIST: CPT

## 2022-07-07 PROCEDURE — 7100000011 HC PHASE II RECOVERY - ADDTL 15 MIN: Performed by: UROLOGY

## 2022-07-07 PROCEDURE — 7100000010 HC PHASE II RECOVERY - FIRST 15 MIN: Performed by: UROLOGY

## 2022-07-07 PROCEDURE — 6360000002 HC RX W HCPCS: Performed by: NURSE ANESTHETIST, CERTIFIED REGISTERED

## 2022-07-07 PROCEDURE — 6370000000 HC RX 637 (ALT 250 FOR IP): Performed by: ANESTHESIOLOGY

## 2022-07-07 PROCEDURE — 6360000002 HC RX W HCPCS: Performed by: UROLOGY

## 2022-07-07 PROCEDURE — 3700000000 HC ANESTHESIA ATTENDED CARE: Performed by: UROLOGY

## 2022-07-07 PROCEDURE — 2709999900 HC NON-CHARGEABLE SUPPLY: Performed by: UROLOGY

## 2022-07-07 PROCEDURE — 2500000003 HC RX 250 WO HCPCS

## 2022-07-07 PROCEDURE — 54530 REMOVAL OF TESTIS: CPT | Performed by: UROLOGY

## 2022-07-07 PROCEDURE — 2580000003 HC RX 258: Performed by: ANESTHESIOLOGY

## 2022-07-07 PROCEDURE — 2500000003 HC RX 250 WO HCPCS: Performed by: NURSE ANESTHETIST, CERTIFIED REGISTERED

## 2022-07-07 PROCEDURE — 82962 GLUCOSE BLOOD TEST: CPT

## 2022-07-07 PROCEDURE — 6360000002 HC RX W HCPCS: Performed by: ANESTHESIOLOGY

## 2022-07-07 PROCEDURE — 88305 TISSUE EXAM BY PATHOLOGIST: CPT

## 2022-07-07 PROCEDURE — 7100000001 HC PACU RECOVERY - ADDTL 15 MIN: Performed by: UROLOGY

## 2022-07-07 PROCEDURE — 3700000001 HC ADD 15 MINUTES (ANESTHESIA): Performed by: UROLOGY

## 2022-07-07 PROCEDURE — 7100000000 HC PACU RECOVERY - FIRST 15 MIN: Performed by: UROLOGY

## 2022-07-07 PROCEDURE — 3600000002 HC SURGERY LEVEL 2 BASE: Performed by: UROLOGY

## 2022-07-07 RX ORDER — FENTANYL CITRATE 50 UG/ML
INJECTION, SOLUTION INTRAMUSCULAR; INTRAVENOUS PRN
Status: DISCONTINUED | OUTPATIENT
Start: 2022-07-07 | End: 2022-07-07 | Stop reason: SDUPTHER

## 2022-07-07 RX ORDER — SODIUM CHLORIDE 0.9 % (FLUSH) 0.9 %
5-40 SYRINGE (ML) INJECTION PRN
Status: DISCONTINUED | OUTPATIENT
Start: 2022-07-07 | End: 2022-07-07 | Stop reason: HOSPADM

## 2022-07-07 RX ORDER — ROCURONIUM BROMIDE 10 MG/ML
INJECTION, SOLUTION INTRAVENOUS PRN
Status: DISCONTINUED | OUTPATIENT
Start: 2022-07-07 | End: 2022-07-07 | Stop reason: SDUPTHER

## 2022-07-07 RX ORDER — LIDOCAINE HYDROCHLORIDE 20 MG/ML
INJECTION, SOLUTION EPIDURAL; INFILTRATION; INTRACAUDAL; PERINEURAL PRN
Status: DISCONTINUED | OUTPATIENT
Start: 2022-07-07 | End: 2022-07-07 | Stop reason: SDUPTHER

## 2022-07-07 RX ORDER — SODIUM CHLORIDE 0.9 % (FLUSH) 0.9 %
5-40 SYRINGE (ML) INJECTION EVERY 12 HOURS SCHEDULED
Status: DISCONTINUED | OUTPATIENT
Start: 2022-07-07 | End: 2022-07-07 | Stop reason: HOSPADM

## 2022-07-07 RX ORDER — DEXAMETHASONE SODIUM PHOSPHATE 4 MG/ML
INJECTION, SOLUTION INTRA-ARTICULAR; INTRALESIONAL; INTRAMUSCULAR; INTRAVENOUS; SOFT TISSUE PRN
Status: DISCONTINUED | OUTPATIENT
Start: 2022-07-07 | End: 2022-07-07 | Stop reason: SDUPTHER

## 2022-07-07 RX ORDER — DIPHENHYDRAMINE HYDROCHLORIDE 50 MG/ML
12.5 INJECTION INTRAMUSCULAR; INTRAVENOUS
Status: DISCONTINUED | OUTPATIENT
Start: 2022-07-07 | End: 2022-07-07 | Stop reason: HOSPADM

## 2022-07-07 RX ORDER — PROCHLORPERAZINE EDISYLATE 5 MG/ML
5 INJECTION INTRAMUSCULAR; INTRAVENOUS
Status: COMPLETED | OUTPATIENT
Start: 2022-07-07 | End: 2022-07-07

## 2022-07-07 RX ORDER — OXYCODONE HYDROCHLORIDE 5 MG/1
5 TABLET ORAL
Status: COMPLETED | OUTPATIENT
Start: 2022-07-07 | End: 2022-07-07

## 2022-07-07 RX ORDER — GLUCAGON 1 MG/ML
1 KIT INJECTION PRN
Status: DISCONTINUED | OUTPATIENT
Start: 2022-07-07 | End: 2022-07-07 | Stop reason: HOSPADM

## 2022-07-07 RX ORDER — SODIUM CHLORIDE 9 MG/ML
INJECTION, SOLUTION INTRAVENOUS PRN
Status: DISCONTINUED | OUTPATIENT
Start: 2022-07-07 | End: 2022-07-07 | Stop reason: HOSPADM

## 2022-07-07 RX ORDER — ACETAMINOPHEN 500 MG
1000 TABLET ORAL ONCE
Status: COMPLETED | OUTPATIENT
Start: 2022-07-07 | End: 2022-07-07

## 2022-07-07 RX ORDER — DEXTROSE MONOHYDRATE 50 MG/ML
100 INJECTION, SOLUTION INTRAVENOUS PRN
Status: DISCONTINUED | OUTPATIENT
Start: 2022-07-07 | End: 2022-07-07 | Stop reason: HOSPADM

## 2022-07-07 RX ORDER — ONDANSETRON 2 MG/ML
4 INJECTION INTRAMUSCULAR; INTRAVENOUS EVERY 6 HOURS PRN
Status: DISCONTINUED | OUTPATIENT
Start: 2022-07-07 | End: 2022-07-07 | Stop reason: HOSPADM

## 2022-07-07 RX ORDER — SENNA AND DOCUSATE SODIUM 50; 8.6 MG/1; MG/1
1 TABLET, FILM COATED ORAL DAILY PRN
Qty: 30 TABLET | Refills: 0 | Status: SHIPPED | OUTPATIENT
Start: 2022-07-07 | End: 2022-09-06

## 2022-07-07 RX ORDER — LIDOCAINE HYDROCHLORIDE 20 MG/ML
INJECTION, SOLUTION EPIDURAL; INFILTRATION; INTRACAUDAL; PERINEURAL PRN
Status: DISCONTINUED | OUTPATIENT
Start: 2022-07-07 | End: 2022-07-07 | Stop reason: ALTCHOICE

## 2022-07-07 RX ORDER — FAMOTIDINE 20 MG/1
20 TABLET, FILM COATED ORAL ONCE
Status: COMPLETED | OUTPATIENT
Start: 2022-07-07 | End: 2022-07-07

## 2022-07-07 RX ORDER — BUPIVACAINE HYDROCHLORIDE 2.5 MG/ML
INJECTION, SOLUTION EPIDURAL; INFILTRATION; INTRACAUDAL PRN
Status: DISCONTINUED | OUTPATIENT
Start: 2022-07-07 | End: 2022-07-07 | Stop reason: ALTCHOICE

## 2022-07-07 RX ORDER — SODIUM CHLORIDE, SODIUM LACTATE, POTASSIUM CHLORIDE, CALCIUM CHLORIDE 600; 310; 30; 20 MG/100ML; MG/100ML; MG/100ML; MG/100ML
INJECTION, SOLUTION INTRAVENOUS CONTINUOUS
Status: DISCONTINUED | OUTPATIENT
Start: 2022-07-07 | End: 2022-07-07 | Stop reason: HOSPADM

## 2022-07-07 RX ORDER — GLYCOPYRROLATE 0.2 MG/ML
INJECTION INTRAMUSCULAR; INTRAVENOUS PRN
Status: DISCONTINUED | OUTPATIENT
Start: 2022-07-07 | End: 2022-07-07 | Stop reason: SDUPTHER

## 2022-07-07 RX ORDER — MIDAZOLAM HYDROCHLORIDE 2 MG/2ML
2 INJECTION, SOLUTION INTRAMUSCULAR; INTRAVENOUS
Status: DISCONTINUED | OUTPATIENT
Start: 2022-07-07 | End: 2022-07-07 | Stop reason: HOSPADM

## 2022-07-07 RX ORDER — PROPOFOL 10 MG/ML
INJECTION, EMULSION INTRAVENOUS PRN
Status: DISCONTINUED | OUTPATIENT
Start: 2022-07-07 | End: 2022-07-07 | Stop reason: SDUPTHER

## 2022-07-07 RX ORDER — HYDROMORPHONE HYDROCHLORIDE 2 MG/ML
0.5 INJECTION, SOLUTION INTRAMUSCULAR; INTRAVENOUS; SUBCUTANEOUS EVERY 10 MIN PRN
Status: DISCONTINUED | OUTPATIENT
Start: 2022-07-07 | End: 2022-07-07 | Stop reason: HOSPADM

## 2022-07-07 RX ORDER — FENTANYL CITRATE 50 UG/ML
100 INJECTION, SOLUTION INTRAMUSCULAR; INTRAVENOUS
Status: DISCONTINUED | OUTPATIENT
Start: 2022-07-07 | End: 2022-07-07 | Stop reason: HOSPADM

## 2022-07-07 RX ORDER — SUCCINYLCHOLINE CHLORIDE 20 MG/ML
INJECTION INTRAMUSCULAR; INTRAVENOUS PRN
Status: DISCONTINUED | OUTPATIENT
Start: 2022-07-07 | End: 2022-07-07 | Stop reason: SDUPTHER

## 2022-07-07 RX ORDER — HYDROCODONE BITARTRATE AND ACETAMINOPHEN 5; 325 MG/1; MG/1
1 TABLET ORAL EVERY 6 HOURS PRN
Qty: 15 TABLET | Refills: 0 | Status: SHIPPED | OUTPATIENT
Start: 2022-07-07 | End: 2022-07-12

## 2022-07-07 RX ORDER — LIDOCAINE HYDROCHLORIDE 10 MG/ML
1 INJECTION, SOLUTION EPIDURAL; INFILTRATION; INTRACAUDAL; PERINEURAL
Status: DISCONTINUED | OUTPATIENT
Start: 2022-07-07 | End: 2022-07-07 | Stop reason: HOSPADM

## 2022-07-07 RX ORDER — ONDANSETRON 2 MG/ML
INJECTION INTRAMUSCULAR; INTRAVENOUS PRN
Status: DISCONTINUED | OUTPATIENT
Start: 2022-07-07 | End: 2022-07-07 | Stop reason: SDUPTHER

## 2022-07-07 RX ORDER — NEOSTIGMINE METHYLSULFATE 1 MG/ML
INJECTION, SOLUTION INTRAVENOUS PRN
Status: DISCONTINUED | OUTPATIENT
Start: 2022-07-07 | End: 2022-07-07 | Stop reason: SDUPTHER

## 2022-07-07 RX ADMIN — FAMOTIDINE 20 MG: 20 TABLET, FILM COATED ORAL at 06:52

## 2022-07-07 RX ADMIN — PROCHLORPERAZINE EDISYLATE 5 MG: 5 INJECTION INTRAMUSCULAR; INTRAVENOUS at 09:03

## 2022-07-07 RX ADMIN — SODIUM CHLORIDE, POTASSIUM CHLORIDE, SODIUM LACTATE AND CALCIUM CHLORIDE: 600; 310; 30; 20 INJECTION, SOLUTION INTRAVENOUS at 06:50

## 2022-07-07 RX ADMIN — ACETAMINOPHEN 1000 MG: 500 TABLET, FILM COATED ORAL at 06:51

## 2022-07-07 RX ADMIN — ONDANSETRON 4 MG: 2 INJECTION INTRAMUSCULAR; INTRAVENOUS at 07:45

## 2022-07-07 RX ADMIN — DEXAMETHASONE SODIUM PHOSPHATE 10 MG: 4 INJECTION, SOLUTION INTRAMUSCULAR; INTRAVENOUS at 07:45

## 2022-07-07 RX ADMIN — PROPOFOL 200 MG: 10 INJECTION, EMULSION INTRAVENOUS at 07:37

## 2022-07-07 RX ADMIN — ROCURONIUM BROMIDE 30 MG: 50 INJECTION, SOLUTION INTRAVENOUS at 07:57

## 2022-07-07 RX ADMIN — Medication 2000 MG: at 07:46

## 2022-07-07 RX ADMIN — OXYCODONE 5 MG: 5 TABLET ORAL at 09:04

## 2022-07-07 RX ADMIN — Medication 180 MG: at 07:37

## 2022-07-07 RX ADMIN — HYDROMORPHONE HYDROCHLORIDE 0.5 MG: 2 INJECTION, SOLUTION INTRAMUSCULAR; INTRAVENOUS; SUBCUTANEOUS at 09:00

## 2022-07-07 RX ADMIN — GLYCOPYRROLATE 0.8 MG: 0.2 INJECTION INTRAMUSCULAR; INTRAVENOUS at 08:19

## 2022-07-07 RX ADMIN — Medication 5 MG: at 08:19

## 2022-07-07 RX ADMIN — ONDANSETRON 4 MG: 2 INJECTION INTRAMUSCULAR; INTRAVENOUS at 10:16

## 2022-07-07 RX ADMIN — LIDOCAINE HYDROCHLORIDE 100 MG: 20 INJECTION, SOLUTION EPIDURAL; INFILTRATION; INTRACAUDAL; PERINEURAL at 07:37

## 2022-07-07 RX ADMIN — FENTANYL CITRATE 100 MCG: 50 INJECTION, SOLUTION INTRAMUSCULAR; INTRAVENOUS at 07:37

## 2022-07-07 ASSESSMENT — PAIN DESCRIPTION - LOCATION: LOCATION: SCROTUM

## 2022-07-07 ASSESSMENT — PAIN - FUNCTIONAL ASSESSMENT
PAIN_FUNCTIONAL_ASSESSMENT: 0-10
PAIN_FUNCTIONAL_ASSESSMENT: 0-10
PAIN_FUNCTIONAL_ASSESSMENT: NONE - DENIES PAIN
PAIN_FUNCTIONAL_ASSESSMENT: 0-10
PAIN_FUNCTIONAL_ASSESSMENT: 0-10

## 2022-07-07 ASSESSMENT — PAIN SCALES - GENERAL: PAINLEVEL_OUTOF10: 8

## 2022-07-07 NOTE — OP NOTE
[address] [facility]  OPERATIVE REPORT    Name:  Kenny Feng  MR#:  [id number]  :  1980  ACCOUNT #:  [de-identified]  DATE OF SERVICE:  2022    PREOPERATIVE DIAGNOSIS:  Left testicular cancer with metastatic retroperitoneal adenopathy. POSTOPERATIVE DIAGNOSIS:  Left testicular cancer with metastatic retroperitoneal adenopathy. PROCEDURE PERFORMED:  Left radical inguinal orchiectomy. SURGEON:  Jessica El MD    ASSISTANT:  Amadou Burrows    ANESTHESIA:  General endotracheal.    COMPLICATIONS:  None. SPECIMENS REMOVED:  Left testicle and distal spermatic cord. IMPLANTS:  None. ESTIMATED BLOOD LOSS:  Minimal.    INDICATION FOR THE PROCEDURE:  The patient is a very pleasant 68-year-old gentleman who presented with abdominal pain and was found to have impressive retroperitoneal adenopathy. This led to discovery of a primary left testicular cancer. After discussion of treatment options, he presents today for inguinal orchiectomy and definitive diagnosis. PROCEDURE:  After informed consent was obtained, he was taken to operating room 3 in the [place] [address]. After induction of general anesthesia and placement of an endotracheal tube, his genitalia and lower abdomen and mid thighs were prepped and draped in usual sterile fashion. We performed a time-out. We verified the side of the procedure being his left. He was given Ancef as prophylactic antibiotic. I then made an approximately 3-4-cm left inguinal incision centered over the inguinal ring. Using electrocautery, this was taken down to the external oblique fascia. The rolled edge was identified as was the inguinal ring. Using a scalpel, I sharply incised the fascia and then using Bren scissors opened it up into the inguinal ring. I was careful to avoid the ilioinguinal nerve. I then encircled the entire left spermatic cord with a Penrose drain as a tourniquet.   I then carefully delivered the testis from the scrotum and divided the gubernacular attachments using electrocautery. We then dissected the entire cord as proximal as possible to where it entered the retroperitoneum. Two 6-inch clamps were placed across the cord and it was divided sharply. An 0 silk was used to ligate the proximal portion of the cord and was left long. I then used a 2-0 Vicryl stick tie to ensure good hemostasis. The cord was flushed and there was excellent hemostasis. It was then pressed into the retroperitoneum. I then closed the external oblique fascia using a 3-0 Vicryl in a running fashion. The wound was irrigated and approximately 12 mL of 1% plain lidocaine and 0.25% Marcaine mixed 50:50 were injected into the edges of the incision site. A 3-0 Vicryl was used to close the subcutaneous tissues. A 4-0 Monocryl was used to approximate the skin edges. Sterile dressings were placed. The patient was also placed in a jockstrap with fluffs as support. He was awakened, extubated, and taken to the recovery room in stable condition. There were no known complications. DISPOSITION:  The patient will follow up with me in approximately one week. The hope is he will begin his systemic cisplatin-based chemotherapy in the next 10-14 days.       Veronica Pat MD      [id number]  D:  07/07/2022 8:40  T:  07/07/2022 12:07  JOB #:  [id number]

## 2022-07-07 NOTE — ANESTHESIA PRE PROCEDURE
Department of Anesthesiology  Preprocedure Note       Name:  Roger Raygoza   Age:  38 y.o.  :  1980                                          MRN:  [id number]         Date:  2022      Surgeon: Jessie Mohs): Lorin Godinez MD    Procedure: Procedure(s):  LEFT RADICAL ORCHIECTOMY Soniya Badder APPROACH    Medications prior to admission:   Prior to Admission medications    Medication Sig Start Date End Date Taking?  Authorizing Provider   telmisartan-amLODIPine (TWYNSTA) 80-5 MG TABS tablet Take 1 tablet by mouth daily    Historical Provider, MD   atenolol (TENORMIN) 50 MG tablet Take 50 mg by mouth daily 22   Historical Provider, MD   atorvastatin (LIPITOR) 20 MG tablet Take 20 mg by mouth nightly 3/21/22   Historical Provider, MD   metFORMIN (GLUCOPHAGE-XR) 500 MG extended release tablet Take 2 with breakfast and supper - Diabetes 22   Historical Provider, MD   spironolactone (ALDACTONE) 50 MG tablet Take 50 mg by mouth daily 3/21/22   Historical Provider, MD       Current medications:    Current Facility-Administered Medications   Medication Dose Route Frequency Provider Last Rate Last Admin    lidocaine PF 1 % injection 1 mL  1 mL IntraDERmal Once PRN Sumaya Snider MD        acetaminophen (TYLENOL) tablet 1,000 mg  1,000 mg Oral Once Sumaya Snider MD        fentaNYL (SUBLIMAZE) injection 100 mcg  100 mcg IntraVENous Once PRN Sumaya Snider MD        lactated ringers infusion   IntraVENous Continuous Sumaya Snider MD        sodium chloride flush 0.9 % injection 5-40 mL  5-40 mL IntraVENous 2 times per day Sumaya Snider MD        sodium chloride flush 0.9 % injection 5-40 mL  5-40 mL IntraVENous PRN Sumaya Snider MD        0.9 % sodium chloride infusion   IntraVENous PRN Sumaya Snider MD        midazolam PF (VERSED) injection 2 mg  2 mg IntraVENous Once PRN Sumaya Snider MD        ceFAZolin (ANCEF) 2000 mg in sterile water 20 mL IV syringe  2,000 mg IntraVENous On Call to OR David Mcclain MD           Allergies:  No Known Allergies    Problem List:    Patient Active Problem List   Diagnosis Code    Hypertension, essential I10    Hyperlipidemia E78.5    Type 2 diabetes mellitus with hyperglycemia, without long-term current use of insulin (HCC) E11.65    Testicular mass N50.89    Malignant neoplasm of descended left testis (HCC) C62.12    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    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    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       Past Medical History:        Diagnosis Date    Benign essential hypertension     controlled with med    Diabetes mellitus type 2, noninsulin dependent ([address]     oral reliant; AVG -105; pt does have s.s of hypoglycemia but does not know what BS is when it gets low; last A1C    Fatty liver     per pt    Hyperlipidemia     managed with med    Testicular mass     Left       Past Surgical History:        Procedure Laterality Date    APPENDECTOMY      KIDNEY STONE SURGERY Right 2011       Social History:    Social History     Tobacco Use    Smoking status: Never Smoker    Smokeless tobacco: Never Used   Substance Use Topics    Alcohol use: Yes     Comment: one or two per week                                Counseling given: Not Answered      Vital Signs (Current):   Vitals:    07/06/22 0829   Weight: 255 lb (115.7 kg)   Height: 6' 2\" (1.88 m)                                              BP Readings from Last 3 Encounters:   07/05/22 (!) 139/98   07/01/22 (!) 152/99       NPO Status:                                                                                 BMI:   Wt Readings from Last 3 Encounters:   07/06/22 255 lb (115.7 kg)   07/05/22 256 lb 4.8 oz (116.3 kg)   07/01/22 255 lb (115.7 kg)     Body mass index is 32.74 kg/m².     CBC:   Lab Results   Component Value Date/Time    WBC 6.0 07/05/2022 07:01 AM    RBC 4.93 07/05/2022 07:01 AM    HGB 14.6 07/05/2022 07:01 AM    HCT 42.4 07/05/2022 07:01 AM    MCV 86.0 07/05/2022 07:01 AM    RDW 12.7 07/05/2022 07:01 AM     07/05/2022 07:01 AM       CMP:   Lab Results   Component Value Date/Time     07/05/2022 07:01 AM    K 3.7 07/05/2022 07:01 AM     07/05/2022 07:01 AM    CO2 28 07/05/2022 07:01 AM    BUN 13 07/05/2022 07:01 AM    CREATININE 1.10 07/05/2022 07:01 AM    GFRAA >60 07/05/2022 07:01 AM    LABGLOM >60 07/05/2022 07:01 AM    GLUCOSE 99 07/05/2022 07:01 AM    PROT 8.1 07/05/2022 07:01 AM    CALCIUM 8.7 07/05/2022 07:01 AM    BILITOT 0.7 07/05/2022 07:01 AM    ALKPHOS 114 07/05/2022 07:01 AM    AST 36 07/05/2022 07:01 AM    ALT 56 07/05/2022 07:01 AM       POC Tests:   Recent Labs     07/05/22  1134   POCGLU 90       Coags: No results found for: PROTIME, INR, APTT    HCG (If Applicable):   Lab Results   Component Value Date    HCGQUANT 184 (H) 07/03/2022        ABGs: No results found for: PHART, PO2ART, HEI6BMV, WAH1WOX, BEART, F1CGJMRC     Type & Screen (If Applicable):  No results found for: LABABO, LABRH    Drug/Infectious Status (If Applicable):  No results found for: HIV, HEPCAB    COVID-19 Screening (If Applicable): No results found for: COVID19        Anesthesia Evaluation  Patient summary reviewed and Nursing notes reviewed no history of anesthetic complications:   Airway: Mallampati: II  TM distance: >3 FB   Neck ROM: full  Comment: Light beard  Mouth opening: > = 3 FB   Dental: normal exam         Pulmonary:Negative Pulmonary ROS breath sounds clear to auscultation                             Cardiovascular:  Exercise tolerance: good (>4 METS), Active and denied chest pain, SOB, syncope   (+) hypertension:, hyperlipidemia        Rhythm: regular  Rate: normal                 ROS comment: Echo 7/2022 - normal LV fxn, mild MR     Neuro/Psych:   Negative Neuro/Psych ROS              GI/Hepatic/Renal:   (+) GERD: poorly controlled, liver disease (fatty liver):,          ROS comment: Suspected testicular cancer with associated significant

## 2022-07-07 NOTE — ANESTHESIA POSTPROCEDURE EVALUATION
Department of Anesthesiology  Postprocedure Note    Patient: Segundo Becerril  MRN: [id number]  YOB: 1980  Date of evaluation: 7/7/2022      Procedure Summary     Date: 07/07/22 Room / Location: [facility] MAIN OR 03 / [facility] MAIN OR    Anesthesia Start: 0732 Anesthesia Stop: 4496    Procedure: LEFT RADICAL ORCHIECTOMY Julio C Merle APPROACH (Left Groin) Diagnosis:       Testicular mass      (Testicular mass [N50.89])    Providers: Jessi Lira MD Responsible Provider: Randy Gupta MD    Anesthesia Type: general ASA Status: 3          Anesthesia Type: No value filed. Elise Phase I: Elise Score: 8    Elise Phase II: Elise Score: 9      Anesthesia Post Evaluation    Patient location during evaluation: PACU  Patient participation: complete - patient participated  Level of consciousness: awake and alert  Airway patency: patent  Nausea: well controlled. Complications: no  Cardiovascular status: acceptable.   Respiratory status: acceptable  Hydration status: stable

## 2022-07-07 NOTE — ANESTHESIA PROCEDURE NOTES
Airway  Urgency: elective    Airway not difficult    General Information and Staff    Patient location during procedure: OR  Other anesthesia staff: Mally Poole RN  Performed: resident/CRNA and other anesthesia staff     Indications and Patient Condition  Indications for airway management: anesthesia  Spontaneous Ventilation: absent  Sedation level: deep  Preoxygenated: yes  Patient position: sniffing  MILS not maintained throughout  Mask difficulty assessment: not attempted    Final Airway Details  Final airway type: endotracheal airway      Successful airway: ETT  Cuffed: yes   Successful intubation technique: direct laryngoscopy  Facilitating devices/methods: intubating stylet and cricoid pressure  Endotracheal tube insertion site: oral  Blade: Matthew  Blade size: #4  ETT size (mm): 8.0  Cormack-Lehane Classification: grade I - full view of glottis  Placement verified by: chest auscultation and capnometry   Measured from: teeth  ETT to teeth (cm): 23  Number of attempts at approach: 1  Ventilation between attempts: bag mask  Number of other approaches attempted: 0    no

## 2022-07-07 NOTE — BRIEF OP NOTE
Brief Postoperative Note      Patient: Noemy Gardner  YOB: 1980  MRN: [id number]    Date of Procedure: 7/7/2022    Pre-Op Diagnosis: Testicular mass [N50.89]  Left    Post-Op Diagnosis: Same       Procedure(s):  LEFT RADICAL ORCHIECTOMY Maricruz Eliezer APPROACH    Surgeon(s): Pelon Deluca MD    Assistant:  * No surgical staff found *    Anesthesia: General    Estimated Blood Loss (mL): Minimal    Complications: None    Specimens:   ID Type Source Tests Collected by Time Destination   A : Left testicle Tissue Scrotum SURGICAL PATHOLOGY Pelon Deluca MD 7/7/2022 0169        Implants:  * No implants in log *      Drains: * No LDAs found *    Findings: enlarged, firm left testicl c/w primary GCT.      Electronically signed by Pelon Deluca MD on 7/7/2022 at 8:29 AM

## 2022-07-10 ENCOUNTER — HOSPITAL ENCOUNTER (EMERGENCY)
Age: 42
Discharge: HOME | End: 2022-07-10
Attending: EMERGENCY MEDICINE
Payer: COMMERCIAL

## 2022-07-10 ENCOUNTER — APPOINTMENT (OUTPATIENT)
Dept: CT IMAGING | Age: 42
End: 2022-07-10
Payer: COMMERCIAL

## 2022-07-10 VITALS
BODY MASS INDEX: 32.21 KG/M2 | HEART RATE: 67 BPM | WEIGHT: 251 LBS | OXYGEN SATURATION: 98 % | DIASTOLIC BLOOD PRESSURE: 98 MMHG | TEMPERATURE: 97.4 F | HEIGHT: 74 IN | SYSTOLIC BLOOD PRESSURE: 143 MMHG | RESPIRATION RATE: 16 BRPM

## 2022-07-10 VITALS
OXYGEN SATURATION: 94 % | RESPIRATION RATE: 18 BRPM | HEART RATE: 83 BPM | TEMPERATURE: 98.2 F | SYSTOLIC BLOOD PRESSURE: 152 MMHG | BODY MASS INDEX: 32.73 KG/M2 | WEIGHT: 255 LBS | DIASTOLIC BLOOD PRESSURE: 70 MMHG | HEIGHT: 74 IN

## 2022-07-10 DIAGNOSIS — M54.50 ACUTE RIGHT-SIDED LOW BACK PAIN WITHOUT SCIATICA: Primary | ICD-10-CM

## 2022-07-10 DIAGNOSIS — R59.0 RETROPERITONEAL LYMPHADENOPATHY: ICD-10-CM

## 2022-07-10 DIAGNOSIS — R10.9 RIGHT FLANK PAIN: Primary | ICD-10-CM

## 2022-07-10 LAB
ALBUMIN SERPL-MCNC: 3.9 G/DL (ref 3.5–5)
ALBUMIN/GLOB SERPL: 0.8 {RATIO} (ref 1.2–3.5)
ALP SERPL-CCNC: 136 U/L (ref 50–136)
ALT SERPL-CCNC: 56 U/L (ref 12–65)
ANION GAP SERPL CALC-SCNC: 4 MMOL/L (ref 7–16)
AST SERPL-CCNC: 42 U/L (ref 15–37)
BILIRUB SERPL-MCNC: 0.5 MG/DL (ref 0.2–1.1)
BILIRUB UR QL: NEGATIVE
BUN SERPL-MCNC: 11 MG/DL (ref 6–23)
CALCIUM SERPL-MCNC: 9.3 MG/DL (ref 8.3–10.4)
CHLORIDE SERPL-SCNC: 108 MMOL/L (ref 98–107)
CO2 SERPL-SCNC: 26 MMOL/L (ref 21–32)
CREAT SERPL-MCNC: 1 MG/DL (ref 0.8–1.5)
CRP SERPL-MCNC: 0.3 MG/DL (ref 0–0.9)
ERYTHROCYTE [DISTWIDTH] IN BLOOD BY AUTOMATED COUNT: 12.8 % (ref 11.9–14.6)
GLOBULIN SER CALC-MCNC: 5 G/DL (ref 2.3–3.5)
GLUCOSE SERPL-MCNC: 97 MG/DL (ref 65–100)
GLUCOSE UR QL STRIP.AUTO: NEGATIVE MG/DL
HCT VFR BLD AUTO: 42.8 % (ref 41.1–50.3)
HGB BLD-MCNC: 14.7 G/DL (ref 13.6–17.2)
KETONES UR-MCNC: NEGATIVE MG/DL
LACTATE SERPL-SCNC: 1.5 MMOL/L (ref 0.4–2)
LEUKOCYTE ESTERASE UR QL STRIP: NEGATIVE
MCH RBC QN AUTO: 29.4 PG (ref 26.1–32.9)
MCHC RBC AUTO-ENTMCNC: 34.3 G/DL (ref 31.4–35)
MCV RBC AUTO: 85.6 FL (ref 79.6–97.8)
NITRITE UR QL: NEGATIVE
NRBC # BLD: 0 K/UL (ref 0–0.2)
PH UR: 6 [PH] (ref 5–9)
PLATELET # BLD AUTO: 279 K/UL (ref 150–450)
PMV BLD AUTO: 8.8 FL (ref 9.4–12.3)
POTASSIUM SERPL-SCNC: 4.1 MMOL/L (ref 3.5–5.1)
PROT SERPL-MCNC: 8.9 G/DL (ref 6.3–8.2)
PROT UR QL: NEGATIVE MG/DL
RBC # BLD AUTO: 5 M/UL (ref 4.23–5.6)
RBC # UR STRIP: NEGATIVE /UL
SERVICE CMNT-IMP: NORMAL
SODIUM SERPL-SCNC: 138 MMOL/L (ref 138–145)
SP GR UR: 1.01 (ref 1–1.02)
UROBILINOGEN UR QL: 0.2 EU/DL (ref 0.2–1)
WBC # BLD AUTO: 7.9 K/UL (ref 4.3–11.1)

## 2022-07-10 PROCEDURE — 6360000002 HC RX W HCPCS: Performed by: EMERGENCY MEDICINE

## 2022-07-10 PROCEDURE — 6360000002 HC RX W HCPCS: Performed by: NURSE PRACTITIONER

## 2022-07-10 PROCEDURE — 74177 CT ABD & PELVIS W/CONTRAST: CPT

## 2022-07-10 PROCEDURE — 85027 COMPLETE CBC AUTOMATED: CPT

## 2022-07-10 PROCEDURE — 96375 TX/PRO/DX INJ NEW DRUG ADDON: CPT

## 2022-07-10 PROCEDURE — 99285 EMERGENCY DEPT VISIT HI MDM: CPT

## 2022-07-10 PROCEDURE — 80053 COMPREHEN METABOLIC PANEL: CPT

## 2022-07-10 PROCEDURE — 96374 THER/PROPH/DIAG INJ IV PUSH: CPT

## 2022-07-10 PROCEDURE — 6360000004 HC RX CONTRAST MEDICATION: Performed by: EMERGENCY MEDICINE

## 2022-07-10 PROCEDURE — 83605 ASSAY OF LACTIC ACID: CPT

## 2022-07-10 PROCEDURE — 99284 EMERGENCY DEPT VISIT MOD MDM: CPT

## 2022-07-10 PROCEDURE — 81003 URINALYSIS AUTO W/O SCOPE: CPT

## 2022-07-10 PROCEDURE — 86140 C-REACTIVE PROTEIN: CPT

## 2022-07-10 RX ORDER — MORPHINE SULFATE 4 MG/ML
4 INJECTION, SOLUTION INTRAMUSCULAR; INTRAVENOUS
Status: COMPLETED | OUTPATIENT
Start: 2022-07-10 | End: 2022-07-10

## 2022-07-10 RX ORDER — PREDNISONE 20 MG/1
40 TABLET ORAL DAILY
Qty: 10 TABLET | Refills: 0 | Status: SHIPPED | OUTPATIENT
Start: 2022-07-10 | End: 2022-07-10 | Stop reason: SDUPTHER

## 2022-07-10 RX ORDER — HYDROCODONE BITARTRATE AND ACETAMINOPHEN 7.5; 325 MG/1; MG/1
1 TABLET ORAL EVERY 8 HOURS PRN
Qty: 9 TABLET | Refills: 0 | Status: SHIPPED | OUTPATIENT
Start: 2022-07-10 | End: 2022-07-10 | Stop reason: SDUPTHER

## 2022-07-10 RX ORDER — ONDANSETRON 2 MG/ML
4 INJECTION INTRAMUSCULAR; INTRAVENOUS
Status: COMPLETED | OUTPATIENT
Start: 2022-07-10 | End: 2022-07-10

## 2022-07-10 RX ORDER — MORPHINE SULFATE 4 MG/ML
4 INJECTION INTRAVENOUS ONCE
Status: COMPLETED | OUTPATIENT
Start: 2022-07-10 | End: 2022-07-10

## 2022-07-10 RX ORDER — PREDNISONE 20 MG/1
40 TABLET ORAL DAILY
Qty: 10 TABLET | Refills: 0 | Status: SHIPPED | OUTPATIENT
Start: 2022-07-10 | End: 2022-07-15

## 2022-07-10 RX ORDER — HYDROCODONE BITARTRATE AND ACETAMINOPHEN 7.5; 325 MG/1; MG/1
1 TABLET ORAL EVERY 8 HOURS PRN
Qty: 9 TABLET | Refills: 0 | Status: SHIPPED | OUTPATIENT
Start: 2022-07-10 | End: 2022-07-13

## 2022-07-10 RX ORDER — METHYLPREDNISOLONE SODIUM SUCCINATE 125 MG/2ML
125 INJECTION, POWDER, LYOPHILIZED, FOR SOLUTION INTRAMUSCULAR; INTRAVENOUS
Status: COMPLETED | OUTPATIENT
Start: 2022-07-10 | End: 2022-07-10

## 2022-07-10 RX ADMIN — METHYLPREDNISOLONE SODIUM SUCCINATE 125 MG: 125 INJECTION, POWDER, FOR SOLUTION INTRAMUSCULAR; INTRAVENOUS at 19:48

## 2022-07-10 RX ADMIN — IOPAMIDOL 100 ML: 755 INJECTION, SOLUTION INTRAVENOUS at 14:22

## 2022-07-10 RX ADMIN — MORPHINE SULFATE 4 MG: 4 INJECTION INTRAVENOUS at 14:42

## 2022-07-10 RX ADMIN — MORPHINE SULFATE 4 MG: 4 INJECTION INTRAVENOUS at 19:48

## 2022-07-10 RX ADMIN — ONDANSETRON 4 MG: 2 INJECTION INTRAMUSCULAR; INTRAVENOUS at 19:48

## 2022-07-10 ASSESSMENT — ENCOUNTER SYMPTOMS
BACK PAIN: 1
ABDOMINAL PAIN: 0
DIARRHEA: 0
NAUSEA: 0
SHORTNESS OF BREATH: 0
VOMITING: 0
CONSTIPATION: 1
SHORTNESS OF BREATH: 0
BOWEL INCONTINENCE: 0
BACK PAIN: 1
ABDOMINAL PAIN: 0

## 2022-07-10 ASSESSMENT — PAIN - FUNCTIONAL ASSESSMENT: PAIN_FUNCTIONAL_ASSESSMENT: 0-10

## 2022-07-10 ASSESSMENT — PAIN SCALES - GENERAL
PAINLEVEL_OUTOF10: 3
PAINLEVEL_OUTOF10: 10
PAINLEVEL_OUTOF10: 8

## 2022-07-10 ASSESSMENT — PAIN DESCRIPTION - LOCATION: LOCATION: ABDOMEN

## 2022-07-10 NOTE — ED NOTES
MD at bedside     Donna Daley, [facility]  07/10/22 4841

## 2022-07-10 NOTE — ED TRIAGE NOTES
Patient arrives ambulatory to triage with mask in place. Reports right sided back pain. Had surgery last Thursday to remove a testicle due to cancer. Patient reports onset of back pain this am.  Denies pain with urination, blood in urine. Reports more frequent urination.

## 2022-07-10 NOTE — ED TRIAGE NOTES
Per EMS pt was seen downtown today for flank pain, had CT scans, blood work and urine tests and was discharged.  Pt called EMS c/o continual pain and stated he would like to come to ES for a  Second op opinion

## 2022-07-10 NOTE — ED TRIAGE NOTES
Pt states he has pain in his lower back on the right side. Pt states his pain sometimes moves down to his groin and testicle. Pt states he had surgery last week to remove a testicle and was told to come to the ER if he had any pain. Pt states he went downtown and they ran a lot of tests and told him the pain was muscular. Pt states he was given morphine DT and did not have any relief. Pt states the pain has not gotten better since he was discharged.

## 2022-07-10 NOTE — ED PROVIDER NOTES
[facility] Emergency Department Provider Note                   PCP:                Gustavo Sterling MD               Age: 38 y.o. Sex: male       ICD-10-CM    1. Right flank pain  R10.9        DISPOSITION Decision To Discharge 07/10/2022 03:52:34 PM       MDM  Number of Diagnoses or Management Options  Right flank pain: new, needed workup  Diagnosis management comments: 46-year-old male with a history of diabetes, hypertension, cholesterol, and testicular cancer. The testicular cancer was diagnosed about 9 days ago. He underwent a left radical orchiectomy on Thursday. Right lower flank pain began today. No saddle paresthesia, urinary retention, or bowel incontinence to suggest cauda equina. He has some tenderness on palpation to the right lumbar paraspinal/right CVA region. He appears in no acute distress today. He does appear uncomfortable but is not toxic appearing. He had significant relief of his pain with IV morphine today. His work-up today is without acute findings. No significant change to his CT today. Possible musculoskeletal type pain as the pain is worse with movement. Discussed nonpharmacological methods of treatment/pain relief. Encouraged follow-up with his oncologist, urologist, and primary care. I have discussed the results of all labs, procedures, radiographs, and/or treatments with the patient and available family members. Treatment plan is agreed upon by the patient and the patient is ready for discharge. Questions about treatment in the ED and differential diagnosis of presenting condition were answered. Patient was given verbal discharge instructions including, but not limited to, importance of returning to the emergency department for any concern of worsening or continued symptoms. Instructions were given to follow-up with a primary care provider or specialist within 1 to 2 days.   Adverse effects of medications, if prescribed, were discussed and the patient was advised to refrain from significant physical activity until followed up by a primary care physician and to not drive or operate heavy machinery after taking any sedating substances. Amount and/or Complexity of Data Reviewed  Clinical lab tests: reviewed and ordered  Tests in the radiology section of CPT®: reviewed and ordered  Tests in the medicine section of CPT®: reviewed and ordered  Review and summarize past medical records: yes  Discuss the patient with other providers: yes  Independent visualization of images, tracings, or specimens: yes    Risk of Complications, Morbidity, and/or Mortality  Presenting problems: moderate  Diagnostic procedures: moderate  Management options: moderate    Patient Progress  Patient progress: improved       Orders Placed This Encounter   Procedures    CT ABDOMEN PELVIS W IV CONTRAST Additional Contrast? None    CBC    Comprehensive Metabolic Panel    Lactic Acid    C-Reactive Protein    POCT Urine Dipstick    POCT Urinalysis no Micro    Insert peripheral IV        Oskar Franco is a 38 y.o. male who presents to the Emergency Department with chief complaint of    Chief Complaint   Patient presents with    Back Pain      51-year-old male with a history of diabetes, high cholesterol, hypertension, and testicular cancer who presents emergency department today with complaint of right-sided flank pain. The testicular cancer is a recent diagnosis. He had a left radical orchiectomy on Thursday. He denies any issues from the surgery. He states that he had a little bit of a fever the day after the surgery but denies any fever since. He denies any dysuria or hematuria. He states that they have him on pain medication and he was initially experiencing some constipation but he is now taking medication for the constipation with relief. He denies any chest pain, abdominal pain, shortness of breath, nausea, vomiting, or diarrhea.   He took hydrocodone this morning for the pain and this did help some but the pain has persisted in his flank. He reports a history of kidney stones and states that this pain feels similar to previous kidney stones. The history is provided by the patient. Back Pain  Pain location: right flank. Quality:  Stabbing  Radiates to:  Does not radiate  Pain severity:  Moderate  Pain is:  Unable to specify  Onset quality:  Gradual  Duration:  1 day  Timing:  Constant  Progression:  Unchanged  Chronicity:  New  Relieved by: norco.  Worsened by:  Nothing  Ineffective treatments:  Lying down and bed rest  Associated symptoms: no abdominal pain, no bladder incontinence, no bowel incontinence, no chest pain, no dysuria, no fever, no headaches, no leg pain, no numbness, no perianal numbness, no tingling and no weakness    Risk factors: hx of cancer        All other systems reviewed and are negative. Review of Systems   Constitutional: Negative for fever. Respiratory: Negative for shortness of breath. Cardiovascular: Negative for chest pain. Gastrointestinal: Positive for constipation. Negative for abdominal pain, bowel incontinence, diarrhea, nausea and vomiting. Genitourinary: Positive for flank pain. Negative for bladder incontinence and dysuria. Musculoskeletal: Positive for back pain. Neurological: Negative for tingling, weakness, numbness and headaches. All other systems reviewed and are negative.       Past Medical History:   Diagnosis Date    Benign essential hypertension     controlled with med    Diabetes mellitus type 2, noninsulin dependent ([address]     oral reliant; AVG -105; pt does have s.s of hypoglycemia but does not know what BS is when it gets low; last A1C    Fatty liver     per pt    Hyperlipidemia     managed with med    Testicular mass     Left        Past Surgical History:   Procedure Laterality Date    APPENDECTOMY      KIDNEY STONE SURGERY Right 2011    TESTICLE REMOVAL Left 7/7/2022    LEFT RADICAL ORCHIECTOMY Schmid Sails APPROACH performed by Karlie Mercedes MD at [facility] MAIN OR        No family history on file. Social Connections:     Frequency of Communication with Friends and Family: Not on file    Frequency of Social Gatherings with Friends and Family: Not on file    Attends Worship Services: Not on file    Active Member of Clubs or Organizations: Not on file    Attends Club or Organization Meetings: Not on file    Marital Status: Not on file        No Known Allergies     Vitals signs and nursing note reviewed. Patient Vitals for the past 4 hrs:   Temp Pulse Resp BP SpO2   07/10/22 1545 97.4 °F (36.3 °C) 67 16 (!) 143/98 98 %   07/10/22 1530 -- -- -- (!) 143/108 99 %   07/10/22 1515 -- -- -- (!) 149/105 94 %   07/10/22 1415 -- -- -- (!) 139/104 97 %   07/10/22 1400 97.8 °F (36.6 °C) 64 18 (!) 152/103 98 %          Physical Exam  Vitals and nursing note reviewed. Constitutional:       General: He is not in acute distress. Appearance: Normal appearance. He is not ill-appearing, toxic-appearing or diaphoretic. HENT:      Head: Normocephalic and atraumatic. Right Ear: External ear normal.      Left Ear: External ear normal.      Nose: Nose normal.   Eyes:      Extraocular Movements: Extraocular movements intact. Conjunctiva/sclera: Conjunctivae normal.   Cardiovascular:      Rate and Rhythm: Normal rate. Heart sounds: Normal heart sounds. Pulmonary:      Effort: Pulmonary effort is normal. No respiratory distress. Breath sounds: Normal breath sounds. Abdominal:      General: Abdomen is flat. Bowel sounds are normal. There is no distension. Palpations: Abdomen is soft. Tenderness: There is right CVA tenderness. Musculoskeletal:         General: Normal range of motion. Cervical back: Normal and normal range of motion. Thoracic back: Normal.      Lumbar back: Tenderness present. No bony tenderness. Normal range of motion.         Back:       Comments: Tenderness with palpation to the right lumbar paraspinal region. No midline tenderness with palpation of cervical, thoracic, or lumbar spine. Skin:     General: Skin is warm and dry. Capillary Refill: Capillary refill takes less than 2 seconds. Neurological:      General: No focal deficit present. Mental Status: He is alert and oriented to person, place, and time. Psychiatric:         Mood and Affect: Mood normal.         Behavior: Behavior normal.         Thought Content: Thought content normal.         Judgment: Judgment normal.          Procedures      Labs Reviewed   CBC - Abnormal; Notable for the following components:       Result Value    MPV 8.8 (*)     All other components within normal limits   COMPREHENSIVE METABOLIC PANEL - Abnormal; Notable for the following components:    Chloride 108 (*)     Anion Gap 4 (*)     AST 42 (*)     Total Protein 8.9 (*)     Globulin 5.0 (*)     Albumin/Globulin Ratio 0.8 (*)     All other components within normal limits   LACTIC ACID   C-REACTIVE PROTEIN   C-REACTIVE PROTEIN   POCT URINALYSIS DIPSTICK        CT ABDOMEN PELVIS W IV CONTRAST Additional Contrast? None   Final Result      1. Small volume subcutaneous emphysema in the left inguinal subcutaneous fat   after recent testicular mass resection. No well-formed subcutaneous or   intraperitoneal fluid collection within the field-of-view. 2.  Overall no significant change in severe metastatic retroperitoneal   lymphadenopathy within limits of differences in measurement technique and   imaging plane. 3.  The appendix is not definitely visualized, but there is no focal pericecal   inflammatory change. 4.  Unchanged parapelvic renal cysts. No evidence of urinary tract stone disease   or hydroureteronephrosis bilaterally. 5.  Full features are as detailed above.                                ED Course as of 07/10/22 1559   Meenakshi Conde Jul 10, 2022   1543 CT ABDOMEN PELVIS W IV CONTRAST Additional Contrast? None  IMPRESSION:     1. Small volume subcutaneous emphysema in the left inguinal subcutaneous fat  after recent testicular mass resection. No well-formed subcutaneous or  intraperitoneal fluid collection within the field-of-view.     2. Overall no significant change in severe metastatic retroperitoneal  lymphadenopathy within limits of differences in measurement technique and  imaging plane.     3. The appendix is not definitely visualized, but there is no focal pericecal  inflammatory change.     4.  Unchanged parapelvic renal cysts. No evidence of urinary tract stone disease  or hydroureteronephrosis bilaterally.     5. Full features are as detailed above. [BC]      ED Course User Index  [BC] Stuart Nuñez, ROSEMARY - CNP        Voice dictation software was used during the making of this note. This software is not perfect and grammatical and other typographical errors may be present. This note has not been completely proofread for errors.        ROSEMARY Verde - [id number] [facility]  07/10/22 8470

## 2022-07-10 NOTE — ED NOTES
I have reviewed discharge instructions with the patient. The patient verbalized understanding. Patient left ED via Discharge Method: ambulatory to Home with significant other     Opportunity for questions and clarification provided. Patient given 0 scripts. To continue your aftercare when you leave the hospital, you may receive an automated call from our care team to check in on how you are doing. This is a free service and part of our promise to provide the best care and service to meet your aftercare needs.  If you have questions, or wish to unsubscribe from this service please call [phone]. Thank you for Choosing our [facility] Emergency Department.         Jann Alejandra RN  07/10/22 9878

## 2022-07-11 NOTE — ED PROVIDER NOTES
[facility] Emergency Department Provider Note                   PCP:                Gustavo Sterling MD               Age: 38 y.o. Sex: male     No diagnosis found. DISPOSITION          MDM  Number of Diagnoses or Management Options  Diagnosis management comments: Lab work not repeated as it was done just a few hours ago. He was treated with Solu-Medrol morphine and Zofran. His pain has improved. I did review his CT scan with him I suspect his right-sided back pain is related to the large retroperitoneal lymphadenopathy. We will place him on prednisone along with hydrocodone. Advised follow-up with urology as scheduled. Amount and/or Complexity of Data Reviewed  Tests in the medicine section of CPT®: ordered and reviewed    Risk of Complications, Morbidity, and/or Mortality  Presenting problems: moderate  Diagnostic procedures: moderate  Management options: moderate         No orders of the defined types were placed in this encounter. Manish Schultz is a 38 y.o. male who presents to the Emergency Department with chief complaint of    Chief Complaint   Patient presents with    Back Pain      51-year-old  male was recently diagnosed with testicular cancer. He underwent a left orchiectomy 3 days ago. Since last night he has had a right sided back pain. He was seen at [address] [place] [address] earlier today where blood work urine and CT scan did not show anything acute. He is noted to have very large retroperitoneal and intraperitoneal masses. No nausea, vomiting or fever. The history is provided by the patient. Review of Systems   Constitutional: Negative for fever. Respiratory: Negative for shortness of breath. Gastrointestinal: Negative for abdominal pain. Musculoskeletal: Positive for back pain. Neurological: Negative for headaches. All other systems reviewed and are negative.       Past Medical History:   Diagnosis Date    Benign essential hypertension controlled with med    Diabetes mellitus type 2, noninsulin dependent ([address]     oral reliant; AVG -105; pt does have s.s of hypoglycemia but does not know what BS is when it gets low; last A1C    Fatty liver     per pt    Hyperlipidemia     managed with med    Testicular mass     Left        Past Surgical History:   Procedure Laterality Date    APPENDECTOMY      KIDNEY STONE SURGERY Right 2011    TESTICLE REMOVAL Left 7/7/2022    LEFT RADICAL ORCHIECTOMY Kiesha Rangel APPROACH performed by Neo Mack MD at [facility] MAIN OR        History reviewed. No pertinent family history. Social Connections:     Frequency of Communication with Friends and Family: Not on file    Frequency of Social Gatherings with Friends and Family: Not on file    Attends [employer] Services: Not on file    Active Member of Clubs or Organizations: Not on file    Attends Club or Organization Meetings: Not on file    Marital Status: Not on file        No Known Allergies     Vitals signs and nursing note reviewed. Patient Vitals for the past 4 hrs:   Temp Pulse Resp BP SpO2   07/10/22 1822 98.2 °F (36.8 °C) 63 18 (!) 153/107 99 %          Physical Exam  Vitals and nursing note reviewed. Constitutional:       General: He is not in acute distress. Appearance: Normal appearance. He is not toxic-appearing. HENT:      Head: Normocephalic and atraumatic. Nose: Nose normal.      Mouth/Throat:      Mouth: Mucous membranes are moist.   Eyes:      Pupils: Pupils are equal, round, and reactive to light. Cardiovascular:      Rate and Rhythm: Normal rate. Pulses: Normal pulses. Pulmonary:      Effort: Pulmonary effort is normal.   Abdominal:      General: There is no distension. Palpations: Abdomen is soft. Tenderness: There is no abdominal tenderness. Genitourinary:     Penis: Normal.       Comments: Left inguinal incision site healing well without signs of infection.   Musculoskeletal: General: Normal range of motion. Cervical back: Normal range of motion. Skin:     General: Skin is warm and dry. Neurological:      Mental Status: He is alert and oriented to person, place, and time. Psychiatric:         Mood and Affect: Mood normal.         Behavior: Behavior normal.          Procedures      Labs Reviewed - No data to display     No orders to display                          Voice dictation software was used during the making of this note. This software is not perfect and grammatical and other typographical errors may be present. This note has not been completely proofread for errors.      Daya Douglas MD  07/10/22 5621

## 2022-07-11 NOTE — ED NOTES
I have reviewed discharge instructions with the patient. The patient verbalized understanding. Patient left ED via Discharge Method: ambulatory to Home with wife. Opportunity for questions and clarification provided. Patient given 2 scripts. To continue your aftercare when you leave the hospital, you may receive an automated call from our care team to check in on how you are doing. This is a free service and part of our promise to provide the best care and service to meet your aftercare needs.  If you have questions, or wish to unsubscribe from this service please call [phone]. Thank you for Choosing our [address] [place] [address] Emergency Department.           Marie Andre RN  07/10/22 9574

## 2022-07-15 NOTE — PROGRESS NOTES
[address] [facility] Oncology  [address] [facility]  [place], [address] [place] [address]  [phone]        Mr. Jorge Elam is a 38 y.o. male with a diagnosis of testicular cancer. INTERVAL HISTORY: Patient is here today for follow-up after his left radical orchiectomy on 7/7/2022. He is recovered quite well. He has no complaints. He denies any pain. His bowels are normalizing. His hCG today is up to 332. His AFP 1.6. His . His pathology showed a 4.77 cm tumor in the left testicle. 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 We know he has extensive retroperitoneal adenopathy. He is having intermittent significant back pain that is likely related to that adenopathy. Copied from previous note(s):  38 y.o. male with metastatic testicular cancer  [id number]-- likely stage IIC, good risk. His primary tumor appears to be in his left testicle and he has impressive retroperitoneal adenopathy. Importantly, his AFP is normal and his beta-hCG is 184. 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 Although he has impressive retroperitoneal adenopathy that is compressing his vena cava, he is currently not in any distress. He has no signs of vena cava occlusion. He has no significant abdominal or flank pain. I do not think we need to emergently start chemotherapy. Instead I recommend he sperm bank (he wants this prior to chemotherapy) and then move forward with left radical orchiectomy on Thursday (risks of the procedure were discussed with the patient). That will enable us to determine whether or not this is a pure seminoma or a mixed germ cell tumor. He can hopefully then start chemotherapy 10 or so days later. He will most likely need BEP x 3 or EP x 4.      Pedro Greer is a 38 y.o. male he noticed some abdominal fullness and nonspecific back and flank pain over the last several months. Last week he noticed his left testicle was enlarged and harder than it had been. He denies any cough or headache. He has not had any other systemic symptoms or weight loss. He underwent a CT scan that incidentally found large retroperitoneal adenopathy with compression of his IVC. He has had no lower extremity edema. He has no shortness of breath or chest pain. He had serum tumor markers checked that showed a beta-hCG of 184 and an AFP of 2. His creatinine is 1.1. He also had a scrotal ultrasound which shows a normal right testicle. His left testicle is is abnormal.  It is heterogeneous in appearance with an intratesticular solid mass involving the majority of the testicle measuring 4.3 x 3.7 x 3.9 cm. It is consistent with a primary germ cell tumor. He has no history of UDT. He has no family history of testicular malignancy. Past medical, family and social histories, as well as medications and allergies, were reviewed and updated in the medical record as appropriate. PMH:     Past Medical History:   Diagnosis Date    Benign essential hypertension     controlled with med    Diabetes mellitus type 2, noninsulin dependent ([address]     oral reliant; AVG -105; pt does have s.s of hypoglycemia but does not know what BS is when it gets low; last A1C    Fatty liver     per pt    Hyperlipidemia     managed with med    Testicular mass     Left       MEDs:     atenolol  atorvastatin  metFORMIN  sennosides-docusate sodium  spironolactone  telmisartan-amLODIPine Tabs     ALLERGIES:    No Known Allergies    ROS:     Review of Systems   Constitutional: Negative. Negative for chills, fatigue and fever. Respiratory: Negative. Cardiovascular: Negative. Gastrointestinal: Negative. Genitourinary: Negative. Negative for difficulty urinating, dysuria, flank pain, frequency, hematuria and urgency. Musculoskeletal:  Positive for back pain.    All other systems reviewed and are negative. PHYSICAL EXAMINATION    /81 Comment: standing  Pulse 69   Temp 97.8 °F (36.6 °C)   Resp 18   Ht 6' 2\" (1.88 m)   Wt 248 lb (112.5 kg)   SpO2 98%   BMI 31.84 kg/m²   General: well dressed, well nourished, no acute distress  Skin: no rashes  HEENT: Sclera are clear,normocephalic, atraumatic. no external lesions   Cardiovascular: Reg. Normal perfusion  Respiratory: normal respiratory effort, no JVD, no audible wheezing. Musculoskeletal: unremarkable with normal function. No embolic signs or cyanosis. Neurologic exam: intact, no focal deficits, moves all 4 extremities  Psych: normal mood and affect, alert, oriented x 3  LE:  no edema  GI: soft, nontender, no masses, no CVA tenderness  : Inguinal incision is healing well. There is minimal scrotal edema.  exam otherwise normal.      LABORATORY RESULTS:  STM's as above    IMAGING:    XR Results:    === 07/01/22 ===    XR ABDOMEN (KUB) (SINGLE AP VIEW)    - Narrative -  Exam: Single view abdomen. Indication: Abdominal pain and tenderness. Comparison: Abdomen and pelvis CT dated July 02, 2021. FINDINGS:  Soft tissue density along the psoas musculature known to represent  retroperitoneal adenopathy. Nonobstructive bowel gas pattern. No evidence of  acute osseous abnormality. No evidence of pneumatosis or pneumoperitoneum.    - Impression -  1. Non-obstructive bowel gas pattern. CT Results:    === 07/10/22 ===    CT ABDOMEN PELVIS W IV CONTRAST    - Narrative -  EXAMINATION: CT ABDOMEN PELVIS W IV CONTRAST 7/10/2022 2:28 PM    ACCESSION NUMBER: [id number]    COMPARISON: Abdominal x-ray 7/3/2022, CT chest abdomen and pelvis 7/2/2022, CT  abdomen and pelvis 7/1/2022    INDICATION: right flank pain recent surgery right back pain    TECHNIQUE: Contiguous axial computed tomographic images were obtained from the  domes of the diaphragm to the symphysis pubis following administration 100mL  Iso-ezra 370. Coronal reconstructions were also performed. Oral contrast was not  administered. Radiation dose reduction techniques were used for this study. Our CT scanners  use one or all of the following: Automated exposure control, adjustment of the  mA and/or kV according to patient size, iterative reconstruction. FINDINGS:    LIMITED THORAX:The visualized lung bases are clear. The visualized portions of  the heart are unremarkable. PERITONEUM/MESENTERY: No pneumoperitoneum. Persistent retroperitoneal  lymphadenopathy. A reference left-sided sherita conglomerate measures 8.6 x 7.0  cm, previously 8.2 x 6.9 cm (axial image 54). 9    There is unchanged small volume free fluid adjacent to the enlarged lymph nodes  on the right. Stable 1.7 cm retrocrural lymph node (axial image 23). GI TRACT: The appendix is not definitely visualized, but there is no focal area  cecal inflammatory change. The terminal ileum is unremarkable. There is no  evidence of small bowel obstruction. The stomach is unremarkable. SPLEEN: Normal    ADRENALS: Normal    PANCREAS: Normal    LIVER: Unchanged subcentimeter hypodensity in the right hepatic lobe, too small  to actually characterize but statistically most likely to be a cyst.    GALLBLADDER: Normal    KIDNEYS: Unchanged bilateral peripelvic cysts. BLADDER/: Unremarkable urinary bladder. Partially imaged subcutaneous  emphysema in the subcutaneous changes fat of the left inguinal region status  post recent testicular mass resection. VESSELS: The main portal vein and major tributary branches are patent. Unchanged  encasement of portions of the aorta and common iliac arteries by the enlarged  retroperitoneal lymph nodes. BONES/SOFT TISSUES: No suspicious lytic or blastic bony lesions.    - Impression -  1. Small volume subcutaneous emphysema in the left inguinal subcutaneous fat  after recent testicular mass resection.  No well-formed subcutaneous or  intraperitoneal fluid collection within the field-of-view. 2.  Overall no significant change in severe metastatic retroperitoneal  lymphadenopathy within limits of differences in measurement technique and  imaging plane. 3.  The appendix is not definitely visualized, but there is no focal pericecal  inflammatory change. 4.  Unchanged parapelvic renal cysts. No evidence of urinary tract stone disease  or hydroureteronephrosis bilaterally. 5.  Full features are as detailed above. MRI Results:    No results found for this or any previous visit. ASSESSMENT:    Mr. Aggie Han is a 38 y.o. male with a diagnosis of testicular cancer, what is thought to be pure seminoma. His hCG is elevated at 332, but his AFP remains normal.  He is scheduled to see Dr. Kelsea Blanco tomorrow and will begin chemotherapy soon. I would like to see him back after he completes his chemotherapy and has had repeat imaging. Assuming this is a pure seminoma, we will see what kind of response he gets to chemotherapy and then depending on the size of the residual retroperitoneal mass we may need to obtain a PET scan to see if there is any metabolic activity. We should probably wait 4 to 6 weeks from completion of chemotherapy before obtaining that PET scan to ensure we do not see PET activity that is just an inflammatory response rather than metabolically active disease. PLAN:     -Reviewed path  -AFP, LD, HCG Qt. Today  -To see Dr. Kelsea Blanco tomorrow, clear to proceed with chemotherapy. -Exam today, all clear  RTC after completion of chemotherapy w/ Dr. Kelsea Blanco w/ necessary scans. (Approx 4mo)  ________________________________________      I have seen and examined this patient. I have reviewed and edited the note started by the MA and agree with the outlined plan. Part of this note was written by using a voice dictation software. The note has been proof read but may still contain some grammatical/other typographical errors.       Will Jaycee Ruiz [facility]

## 2022-07-15 NOTE — PATIENT INSTRUCTIONS
Patient Instructions from Today's Visit    Reason for Visit:  Follow up - Testicular Cancer     Diagnosis Information:  [email or web address]/. net/about-us/asco-answers-patient-education-materials/yusv-nlajfxu-tlvb-sheets    Plan:  Pathology reviewed - pure seminoma, which is good. Chemotherapy recommended due to lymph nodes. Proceed with appointment with Dr. Vidal Meyer tomorrow. Clear to start chemotherapy from surgical standpoint. Please call us when you've completed chemo so we can set up follow up with us and order necessary scans. Please call us if you have any questions or concerns before your next visit. Follow Up: Follow up with Dr. Van Goldberg after completion of chemotherapy. Recent Lab Results:  No visits with results within 3 Day(s) from this visit.    Latest known visit with results is:   Admission on 07/10/2022, Discharged on 07/10/2022   Component Date Value Ref Range Status    WBC 07/10/2022 7.9  4.3 - 11.1 K/uL Final    RBC 07/10/2022 5.00  4.23 - 5.6 M/uL Final    Hemoglobin 07/10/2022 14.7  13.6 - 17.2 g/dL Final    Hematocrit 07/10/2022 42.8  41.1 - 50.3 % Final    MCV 07/10/2022 85.6  79.6 - 97.8 FL Final    MCH 07/10/2022 29.4  26.1 - 32.9 PG Final    MCHC 07/10/2022 34.3  31.4 - 35.0 g/dL Final    RDW 07/10/2022 12.8  11.9 - 14.6 % Final    Platelets 39/06/1664 279  150 - 450 K/uL Final    MPV 07/10/2022 8.8 (A) 9.4 - 12.3 FL Final    nRBC 07/10/2022 0.00  0.0 - 0.2 K/uL Final    **Note: Absolute NRBC parameter is now reported with Hemogram**    Sodium 07/10/2022 138  138 - 145 mmol/L Final    Potassium 07/10/2022 4.1  3.5 - 5.1 mmol/L Final    Chloride 07/10/2022 108 (A) 98 - 107 mmol/L Final    CO2 07/10/2022 26  21 - 32 mmol/L Final    Anion Gap 07/10/2022 4 (A) 7 - 16 mmol/L Final    Glucose 07/10/2022 97  65 - 100 mg/dL Final    BUN 07/10/2022 11  6 - 23 MG/DL Final    CREATININE 07/10/2022 1.00  0.8 - 1.5 MG/DL Final    GFR  07/10/2022 >60  >60 ml/min/1.73m2 Final

## 2022-07-18 ENCOUNTER — HOSPITAL ENCOUNTER (OUTPATIENT)
Dept: LAB | Age: 42
Discharge: HOME | End: 2022-07-21
Payer: COMMERCIAL

## 2022-07-18 ENCOUNTER — OFFICE VISIT (OUTPATIENT)
Dept: ONCOLOGY | Age: 42
End: 2022-07-18
Payer: COMMERCIAL

## 2022-07-18 VITALS
TEMPERATURE: 97.8 F | WEIGHT: 248 LBS | BODY MASS INDEX: 31.83 KG/M2 | SYSTOLIC BLOOD PRESSURE: 126 MMHG | HEIGHT: 74 IN | OXYGEN SATURATION: 98 % | HEART RATE: 69 BPM | RESPIRATION RATE: 18 BRPM | DIASTOLIC BLOOD PRESSURE: 81 MMHG

## 2022-07-18 DIAGNOSIS — C62.90 SEMINOMA (HCC): Primary | ICD-10-CM

## 2022-07-18 DIAGNOSIS — N50.89 TESTICULAR MASS: ICD-10-CM

## 2022-07-18 LAB
AFP-TM SERPL-MCNC: 1.6 NG/ML
HCG SERPL-ACNC: 332 MIU/ML (ref 0–2)
LDH SERPL L TO P-CCNC: 832 U/L (ref 100–190)

## 2022-07-18 PROCEDURE — 82105 ALPHA-FETOPROTEIN SERUM: CPT

## 2022-07-18 PROCEDURE — 99024 POSTOP FOLLOW-UP VISIT: CPT | Performed by: UROLOGY

## 2022-07-18 PROCEDURE — 36415 COLL VENOUS BLD VENIPUNCTURE: CPT

## 2022-07-18 PROCEDURE — 83615 LACTATE (LD) (LDH) ENZYME: CPT

## 2022-07-18 PROCEDURE — 84702 CHORIONIC GONADOTROPIN TEST: CPT

## 2022-07-18 ASSESSMENT — PATIENT HEALTH QUESTIONNAIRE - PHQ9
7. TROUBLE CONCENTRATING ON THINGS, SUCH AS READING THE NEWSPAPER OR WATCHING TELEVISION: 0
SUM OF ALL RESPONSES TO PHQ QUESTIONS 1-9: 0
6. FEELING BAD ABOUT YOURSELF - OR THAT YOU ARE A FAILURE OR HAVE LET YOURSELF OR YOUR FAMILY DOWN: 0
3. TROUBLE FALLING OR STAYING ASLEEP: 0
2. FEELING DOWN, DEPRESSED OR HOPELESS: 0
4. FEELING TIRED OR HAVING LITTLE ENERGY: 0
SUM OF ALL RESPONSES TO PHQ QUESTIONS 1-9: 0
1. LITTLE INTEREST OR PLEASURE IN DOING THINGS: 0
8. MOVING OR SPEAKING SO SLOWLY THAT OTHER PEOPLE COULD HAVE NOTICED. OR THE OPPOSITE, BEING SO FIGETY OR RESTLESS THAT YOU HAVE BEEN MOVING AROUND A LOT MORE THAN USUAL: 0
5. POOR APPETITE OR OVEREATING: 0
SUM OF ALL RESPONSES TO PHQ QUESTIONS 1-9: 0
9. THOUGHTS THAT YOU WOULD BE BETTER OFF DEAD, OR OF HURTING YOURSELF: 0
SUM OF ALL RESPONSES TO PHQ QUESTIONS 1-9: 0
SUM OF ALL RESPONSES TO PHQ9 QUESTIONS 1 & 2: 0

## 2022-07-18 ASSESSMENT — ENCOUNTER SYMPTOMS
GASTROINTESTINAL NEGATIVE: 1
BACK PAIN: 1
RESPIRATORY NEGATIVE: 1

## 2022-07-19 ENCOUNTER — OFFICE VISIT (OUTPATIENT)
Dept: ONCOLOGY | Age: 42
End: 2022-07-19
Payer: COMMERCIAL

## 2022-07-19 ENCOUNTER — HOSPITAL ENCOUNTER (OUTPATIENT)
Dept: LAB | Age: 42
Discharge: HOME | End: 2022-07-22
Payer: COMMERCIAL

## 2022-07-19 VITALS
OXYGEN SATURATION: 98 % | HEIGHT: 73 IN | RESPIRATION RATE: 12 BRPM | BODY MASS INDEX: 32.87 KG/M2 | SYSTOLIC BLOOD PRESSURE: 144 MMHG | WEIGHT: 248 LBS | DIASTOLIC BLOOD PRESSURE: 99 MMHG | TEMPERATURE: 98.1 F | HEART RATE: 84 BPM

## 2022-07-19 DIAGNOSIS — Z95.828 PORT-A-CATH IN PLACE: ICD-10-CM

## 2022-07-19 DIAGNOSIS — C62.90 SEMINOMA (HCC): ICD-10-CM

## 2022-07-19 DIAGNOSIS — T45.1X5A CINV (CHEMOTHERAPY-INDUCED NAUSEA AND VOMITING): ICD-10-CM

## 2022-07-19 DIAGNOSIS — Z79.899 HIGH RISK MEDICATION USE: ICD-10-CM

## 2022-07-19 DIAGNOSIS — C62.90 SEMINOMA (HCC): Primary | ICD-10-CM

## 2022-07-19 DIAGNOSIS — R11.2 CINV (CHEMOTHERAPY-INDUCED NAUSEA AND VOMITING): ICD-10-CM

## 2022-07-19 DIAGNOSIS — R52 PAIN: ICD-10-CM

## 2022-07-19 DIAGNOSIS — Z79.899 NEED FOR PROPHYLACTIC CHEMOTHERAPY: ICD-10-CM

## 2022-07-19 LAB
BASOPHILS # BLD: 0.1 K/UL (ref 0–0.2)
BASOPHILS NFR BLD: 1 % (ref 0–2)
DIFFERENTIAL METHOD BLD: ABNORMAL
EOSINOPHIL # BLD: 0.2 K/UL (ref 0–0.8)
EOSINOPHIL NFR BLD: 1 % (ref 0.5–7.8)
ERYTHROCYTE [DISTWIDTH] IN BLOOD BY AUTOMATED COUNT: 12.8 % (ref 11.9–14.6)
HCT VFR BLD AUTO: 45.3 %
HGB BLD-MCNC: 15.1 G/DL (ref 13.6–17.2)
IMM GRANULOCYTES # BLD AUTO: 0.3 K/UL (ref 0–0.5)
IMM GRANULOCYTES NFR BLD AUTO: 3 % (ref 0–5)
LYMPHOCYTES # BLD: 1 K/UL (ref 0.5–4.6)
LYMPHOCYTES NFR BLD: 8 % (ref 13–44)
MCH RBC QN AUTO: 29.4 PG (ref 26.1–32.9)
MCHC RBC AUTO-ENTMCNC: 33.3 G/DL (ref 31.4–35)
MCV RBC AUTO: 88.3 FL (ref 79.6–97.8)
MONOCYTES # BLD: 1.3 K/UL (ref 0.1–1.3)
MONOCYTES NFR BLD: 10 % (ref 4–12)
NEUTS SEG # BLD: 9.5 K/UL (ref 1.7–8.2)
NEUTS SEG NFR BLD: 77 % (ref 43–78)
NRBC # BLD: 0 K/UL (ref 0–0.2)
PLATELET # BLD AUTO: 359 K/UL (ref 150–450)
PMV BLD AUTO: 8.5 FL (ref 9.4–12.3)
RBC # BLD AUTO: 5.13 M/UL (ref 4.23–5.6)
WBC # BLD AUTO: 12.2 K/UL (ref 4.3–11.1)

## 2022-07-19 PROCEDURE — 85025 COMPLETE CBC W/AUTO DIFF WBC: CPT

## 2022-07-19 PROCEDURE — 36415 COLL VENOUS BLD VENIPUNCTURE: CPT

## 2022-07-19 PROCEDURE — 99215 OFFICE O/P EST HI 40 MIN: CPT | Performed by: INTERNAL MEDICINE

## 2022-07-19 RX ORDER — ONDANSETRON 8 MG/1
8 TABLET, ORALLY DISINTEGRATING ORAL EVERY 8 HOURS PRN
Qty: 90 TABLET | Refills: 2 | Status: SHIPPED | OUTPATIENT
Start: 2022-07-19

## 2022-07-19 RX ORDER — LIDOCAINE AND PRILOCAINE 25; 25 MG/G; MG/G
CREAM TOPICAL
Qty: 1 EACH | Refills: 2 | Status: SHIPPED | OUTPATIENT
Start: 2022-07-19

## 2022-07-19 RX ORDER — PROCHLORPERAZINE MALEATE 10 MG
10 TABLET ORAL EVERY 6 HOURS PRN
Qty: 90 TABLET | Refills: 2 | Status: SHIPPED | OUTPATIENT
Start: 2022-07-19

## 2022-07-19 ASSESSMENT — PATIENT HEALTH QUESTIONNAIRE - PHQ9
SUM OF ALL RESPONSES TO PHQ QUESTIONS 1-9: 0
2. FEELING DOWN, DEPRESSED OR HOPELESS: 0
SUM OF ALL RESPONSES TO PHQ QUESTIONS 1-9: 0

## 2022-07-19 NOTE — PATIENT INSTRUCTIONS
Patient Instructions from Today's Visit    Reason for Visit:  New patient consult for testicular cancer    Diagnosis Information:  [email or web address] net/about-us/asco-answers-patient-education-materials/nlgl-aiislog-cfvu-sheets  Patient was educated and given handouts published by ASCO entitled ASCO Answers Fact Sheets about their diagnosis of testicular during todays office visit. ASCO ANSWERS is a collection of oncologist-approved patient education materials developed by the American Society of Clinical Oncology (ASCO) for people with cancer and their caregivers. [address] [facility],Third Floor. © 2004 AMERICAN SOCIETY OF CLINICAL ONCOLOGY. Testicular Cancer    What is testicular cancer? Testicular cancer begins when healthy cells in 1or both testicles change and grow out of control, tumor. Most testicular tumors develop in germ cells, which produce sperm. These tumors are called germ cell tumors and are divided into 2 types: seminoma or non-seminoma. A non-seminoma grows more quickly and is more likely to spread than a  seminoma, but both types need immediate treatment. What is the function of the testicles? The testicles are a part of a mans reproductive system. Each man has 2 testicles, and they are located under the penis in a sac-like pouch called the scrotum. The testicles make sperm and testosterone. Testosterone is a hormone that plays a role in the development of a mans reproductive organs and other characteristics. What does stage mean? The stage is a way of describing where the cancer is located, if or where it has spread, and whether it is affecting other parts of the body. There are 4 stages for testicular cancer: stages I through III (1 through 3) plus stage 0 (zero), called carcinoma in situ, a precancerous condition. Find more information about these stages at www.cancer. net/testicular. How is testicular cancer treated?   The treatment of testicular cancer depends on the type of tumor (seminoma or non-seminoma), the stage, the amount of certain substances called serum tumor markers in the blood, and the mans overall health. Testicular cancer is almost always curable if found early and is often curable even at later stages. The 3 main treatment options are surgery, chemotherapy, and radiation therapy. Treatment usually starts with  surgery to remove the testicle with cancer. Your doctor may then recommend surveillance to closely monitor for any return of the disease. Some men may also have surgery to remove lymph nodes from the back of the abdomen. Chemotherapy may be given to lower the risk of the cancer returning or to treat cancer that has spread or come back after treatment. Surgery may be done after chemotherapy to remove any remaining tumors. Radiation therapy is used in specific situations. When making treatment decisions, men may also consider a clinical trial. Talk with your doctor about all treatment options and any concerns about how your treatment may affect your sexual functioning and fertility before treatment begins. The side effects of testicular cancer treatment can often be prevented or managed with the help of your health care team. This is called palliative care and is an important part of the overall treatment plan. How can I cope with testicular cancer? Absorbing the news of a cancer diagnosis and communicating with your health care team are key parts of the coping process. Seeking support, organizing your health information, making sure all of your questions are answered, and participating in the decision making process are other steps. Talk with your health care team about any concerns. Understanding your emotions and those of people close to you can be helpful in managing the diagnosis, treatment, and healing process.     Questions to ask the health care team  Regular communication is important in making informed decisions about your health  care. Consider asking your health care team the following questions:   What type of testicular cancer do I have? Can you explain my pathology report (laboratory test results) to me? What stage is the testicular cancer? What does this mean? Would you explain my treatment options? What clinical trials are available for me? Where are they located, and how do I find out  more about them? What treatment plan do you recommend? Why? What is the goal of each treatment? Is it to cure the cancer, prolong my life, or help me  feel better? Who will be part of my treatment team, and what does each member do? How will this treatment affect my daily life? Will I be able to work, exercise, and perform my usual activities? Could this treatment affect my sex life? If so, how and for how long? Will this treatment affect my ability to have children? Should I talk with a fertility specialist  about sperm banking before treatment begins? What long-term side effects may be associated with my cancer treatment? If Im worried about managing the costs of cancer care, who can help me? Where can I find emotional support for me and my family? Whom should I call with questions or problems? Is there anything else I should be asking?     WORDS TO KNOW  Benign: a tumor that in not cancerous    Biopsy: removal of a tissue sample that is then examined under a microscope to check for cancer cells    Chemotherapy: the use of drugs to destroy cancer cells    Lymph node: a tiny, bean-shaped organ that fights infection    Malignant: a tumor that is cancerous    Metastasis: the spread of cancer from where it began to another part of the body    Oncologist: a doctor that specializes in treating cancer    Prognosis: chance of recovery    Radiation therapy: the use of high-energy x-rays to destroy cancer cells    Radical inguinal orchiectomy: removal of a testicle through an incision in the groin    Retroperitoneal lymph node dissection: surgery to remove the lymph nodes from the back of the abdomen    Tumor: an abnormal growth of body tissue    Urologist: a doctor who specializes in treating conditions of the urinary tract  Find more questions to ask the health care team at [facility]. [facility]/testicular. For a digital list of questions, download Cancer. Nets free mobile suresh at www.cancer. net/suresh. The ideas and opinions expressed here do not necessarily reflect the opinions of the American Society of Clinical Oncology (ASCO) or The LEHR. The information in this fact sheet is not intended as medical or legal advice, or as a substitute for consultation with a physician or other licensed health care provider. Patients with health care-related questions should call or see their physician or other health care provider promptly and should not disregard professional medical advice, or delay seeking it, because of information encountered here. The mention of any product, service, or treatment in this fact sheet should not be construed as an ASCO endorsement. [facility] is not responsible for any injury or damage to persons or property arising out of or related to any use of ASCOs patient education materials, or to any errors or omissions. To order more printed copies, please call [phone] or visit www.cancer. net/estore. [address] [address] [facility], [address] [place] [address], [address] [place] [address]  Toll Free: [phone]  Phone: [phone]  www. [email or web address]  [email or web address]  © 2017 American Society of Clinical Oncology. For permissions information, contact [email or web address]    Plan:  Chemo education and financial counseling  You still need pulmonary function tests prior to starting treatment  You need to have a port placed in [facility]    Premed  -Zofran 8mg every 8 hours as needed for nausea.  Take every day for the first week every 8 hours. If after one week you are not having nausea you can stop taking and just take as needed.    -Compazine 10mg every 6-8 hours as needed for nausea (alternate with zofran every 4 hours)     -Emla Cream - apply 1/2 inch to port site 90 minutes prior to needle access    -Drink 8-10 glasses of fluid daily. Tea, water, gatorade, juice, popsicles Etc. ..    -Use plastic forks,spoons to help with metal taste in mouth.     -If mouth sores occurs you can use warm salt/baking soda rinses. Follow Up:  Office visit next week    Recent Lab Results:  .   Hospital Outpatient Visit on 07/19/2022   Component Date Value Ref Range Status    WBC 07/19/2022 12.2 (A) 4.3 - 11.1 K/uL Final    RBC 07/19/2022 5.13  4.23 - 5.6 M/uL Final    Hemoglobin 07/19/2022 15.1  13.6 - 17.2 g/dL Final    Hematocrit 07/19/2022 45.3  % Final    MCV 07/19/2022 88.3  79.6 - 97.8 FL Final    MCH 07/19/2022 29.4  26.1 - 32.9 PG Final    MCHC 07/19/2022 33.3  31.4 - 35.0 g/dL Final    RDW 07/19/2022 12.8  11.9 - 14.6 % Final    Platelets 70/76/7030 359  150 - 450 K/uL Final    MPV 07/19/2022 8.5 (A) 9.4 - 12.3 FL Final    nRBC 07/19/2022 0.00  0.0 - 0.2 K/uL Final    **Note: Absolute NRBC parameter is now reported with Hemogram**    Seg Neutrophils 07/19/2022 77  43 - 78 % Final    Lymphocytes 07/19/2022 8 (A) 13 - 44 % Final    Monocytes 07/19/2022 10  4.0 - 12.0 % Final    Eosinophils % 07/19/2022 1  0.5 - 7.8 % Final    Basophils 07/19/2022 1  0.0 - 2.0 % Final    Immature Granulocytes 07/19/2022 3  0.0 - 5.0 % Final    Segs Absolute 07/19/2022 9.5 (A) 1.7 - 8.2 K/UL Final    Absolute Lymph # 07/19/2022 1.0  0.5 - 4.6 K/UL Final    Absolute Mono # 07/19/2022 1.3  0.1 - 1.3 K/UL Final    Absolute Eos # 07/19/2022 0.2  0.0 - 0.8 K/UL Final    Basophils Absolute 07/19/2022 0.1  0.0 - 0.2 K/UL Final    Absolute Immature Granulocyte 07/19/2022 0.3  0.0 - 0.5 K/UL Final    Differential Type 07/19/2022 AUTOMATED    Final   Hospital Outpatient Visit on 07/18/2022   Component Date Value Ref Range Status    LD 07/18/2022 832 (A) 100 - 190 U/L Final    AFP-Tumor Marker 07/18/2022 1.60  <8.0 ng/mL Final    [employer]. Patient's results of tumor marker testing may not be comparable to labs using different manufacturers/methods. hCG Quant 07/18/2022 332 (A) 0.0 - 2.0 MIU/ML Final    Comment: Gestational Age 0.2-1 Week 5-50        mIU/mL  Gestational Age 1-2 Weeks        mIU/mL  Gestational Age 2-3 Weeks  100-5000     mIU/mL  Gestational Age 3-4 Weeks  500-38735    mIU/mL  Gestational Age 4-5 Weeks  1000-79738   mIU/mL  Gestational Age 5-6 Weeks  24883-832165 mIU/ml  Gestational Age 6-8 Weeks  77676-864058 mIU/ml  Gestational Age 2-3 Months 80578-677964 mIU/ml  If this is for miscarriage diagnosis it is recommended that repeat testing be done at this facility. Treatment Summary has been discussed and given to patient: n/a    -------------------------------------------------------------------------------------------------------------------  Please call our office at [phone] if you have any  of the following symptoms:   Fever of 100.5 or greater  Chills  Shortness of breath  Swelling or pain in one leg    After office hours an answering service is available and will contact a provider for emergencies or if you are experiencing any of the above symptoms. Patient does express an interest in My Chart. My Chart log in information explained on the after visit summary printout at the [address] desk.     Madan Shi RN, BSN  Nurse Navigator  [phone] live [email or web address]      Regimen after orchiectomy   No special premeds  Baseline PFT prior to starting treatment  Port placement recommended  No G-CSF  Tumor markers at start of treatment and with each cycle - AFP, LD, BETA HCG, QT  Restaging scan after chemo completed      Your Chemotherapy Plan      Diagnosis: testicular cancer    Goal of Therapy: _ _ Palliative _x_Curative         Name of Chemotherapy medications: BEP - bleomycin (blenoxane), etoposide (vepesid), and cisplatin (platinol)  Bleomycin - given day 1, 8 and 15  Etoposide - given day 1-5 each cycle  Cisplatin - given day 1-5 each cycle     Number of Cycles Planned: 3                  Length of Cycle:  every 3 weeks      Chemotherapy plan is subject to change at your provider's discretion    A copy of this plan has been discussed and given to patient by Alpa Moulton RN. Chemo Education:   Scheduled  07/21    Consent for therapy:   Completed on 07/21    Potential side effects:  fatigue, hair loss, nausea, taste and appetite changes, mouth sores, pneumonitis (lung inflammation), decrease in blood counts, diarrhea, hearing changes, neuropathy, etc.    Will have a chemo education session prior to starting.

## 2022-07-19 NOTE — PROGRESS NOTES
[facility]: Office Visit Progress Note    Chief Complaint:    Testicular cancer    History of Present Illness:  38 y.o. male past medical history of hypertension, hyperlipidemia, diabetes, presented to the emergency room with testicle swelling and lower back and abdominal pain. Reported low back pain intermittently over the past month, did not have work-up yet. Now he noticed testicular swelling with pain and presented to the emergency room for further evaluation. Scrotal ultrasound showed large left side testicular mass highly concerning for testicular malignancy measuring 4.4 x 3.7 x 3.9 cm. CT showed massive retroperitoneal lymph nodes conglomerates and a largest retrocrural lymph nodes suspicious for lymphoma versus metastatic disease. There is severe luminal narrowing of the IVC given the mass-effect upon the IVC and mass-effect upon the abdominal aorta as well as the iliopsoas musculature. Oncology is consulted for recommendation. 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 Review of Systems:  Constitutional Denies fever or chills. Denies weight loss or appetite changes. Denies fatigue. Denies anorexia. HEENT Denies trauma, bluring vision, hearing loss, ear pain, nosebleeds, sore throat, neck pain and ear discharge. Skin Denies lesions or rashes. Lungs Denies shortness of breath, cough, sputum production or hemoptysis. Cardiovascular Denies chest pain, palpitations, orthopnea, claudication and leg swelling. Gastrointestinal Denies nausea, vomiting, bowel changes. Denies bloody or black stools. Denies abdominal pain.  Status post left orchiectomy. Denies dysuria, frequency or hesitancy of urination   Neuro Denies headaches, visual changes or ataxia. Denies dizziness, tingling, tremors, sensory change, speech change, focal weakness and headaches.      Hematology Denies nasal/gum bleeding, denies easy bruise   Endo Denies heat/cold intolerance, denies diabetes. MSK Back pain. Denies swollen legs, myalgias and falls. Psychiatric/Behavioral Denies depression and substance abuse. The patient is not nervous/anxious. No Known Allergies  Past Medical History:   Diagnosis Date    Benign essential hypertension     controlled with med    Diabetes mellitus type 2, noninsulin dependent ([address]     oral reliant; AVG -105; pt does have s.s of hypoglycemia but does not know what BS is when it gets low; last A1C    Fatty liver     per pt    Hyperlipidemia     managed with med    Testicular mass     Left     Past Surgical History:   Procedure Laterality Date    APPENDECTOMY      KIDNEY STONE SURGERY Right 2011    TESTICLE REMOVAL Left 7/7/2022    LEFT RADICAL ORCHIECTOMY Cynda Cramp APPROACH performed by Mary Ann Denny MD at [facility] MAIN OR     No family history on file. Social History     Socioeconomic History    Marital status:      Spouse name: Not on file    Number of children: Not on file    Years of education: Not on file    Highest education level: Not on file   Occupational History    Not on file   Tobacco Use    Smoking status: Never    Smokeless tobacco: Never   Vaping Use    Vaping Use: Never used   Substance and Sexual Activity    Alcohol use: Yes     Comment: one or two per week    Drug use: Never    Sexual activity: Not on file   Other Topics Concern    Not on file   Social History Narrative    Not on file     Social Determinants of Health     Financial Resource Strain: Not on file   Food Insecurity: Not on file   Transportation Needs: Not on file   Physical Activity: Not on file   Stress: Not on file   Social Connections: Not on file   Intimate Partner Violence: Not on file   Housing Stability: Not on file     Current Outpatient Medications   Medication Sig Dispense Refill    lidocaine-prilocaine (EMLA) 2.5-2.5 % cream Apply topically as needed.  1 each 2 ondansetron (ZOFRAN-ODT) 8 MG TBDP disintegrating tablet Place 1 tablet under the tongue every 8 hours as needed for Nausea or Vomiting 90 tablet 2    prochlorperazine (COMPAZINE) 10 MG tablet Take 1 tablet by mouth every 6 hours as needed (cinv) 90 tablet 2    telmisartan-amLODIPine (TWYNSTA) 80-5 MG TABS tablet Take 1 tablet by mouth daily      atenolol (TENORMIN) 50 MG tablet Take 50 mg by mouth daily      atorvastatin (LIPITOR) 20 MG tablet Take 20 mg by mouth nightly      metFORMIN (GLUCOPHAGE-XR) 500 MG extended release tablet Take 2 with breakfast and supper - Diabetes      spironolactone (ALDACTONE) 50 MG tablet Take 50 mg by mouth daily      sennosides-docusate sodium (SENOKOT-S) 8.6-50 MG tablet Take 1 tablet by mouth daily as needed for Constipation (Patient not taking: No sig reported) 30 tablet 0     No current facility-administered medications for this visit. OBJECTIVE:  BP (!) 144/99 (Site: Left Upper Arm, Position: Standing)   Pulse 84   Temp 98.1 °F (36.7 °C)   Resp 12   Ht 6' 1\" (1.854 m)   Wt 248 lb (112.5 kg)   SpO2 98%   BMI 32.72 kg/m²     Physical Exam:  Constitutional: Oriented to person, place, and time. Well-developed and well-nourished. HEENT: Normocephalic and atraumatic. Oropharynx is clear and moist.   Conjunctivae and EOM are normal. Pupils are equal, round, and reactive to light. No scleral icterus. Neck supple. No JVD present. No tracheal deviation present. No thyromegaly present. Lymph node No palpable submandibular, cervical, supraclavicular, axillary and inguinal lymph nodes. Skin Warm and dry. No bruising and no rash noted. No erythema. No pallor. Respiratory Effort normal and breath sounds normal.  No respiratory distress. No wheezes. No rales. No tenderness. CVS Normal rate, regular rhythm and normal heart sounds. Exam reveals no gallop, no friction and no rub. No murmur heard. Abdomen Status post left orchiectomy.   Right testis soft nontender no mass. Soft. Bowel sounds are normal. Exhibits no distension. There is no tenderness. There is no rebound and no guarding. Neuro Normal reflexes. No cranial nerve deficit. Exhibits normal muscle tone, 5 of 5 strength of all extremities. MSK Normal range of motion in general.  No edema and no tenderness. Psych Normal mood, affect, behavior, judgment and thought content      Labs:  Recent Results (from the past 24 hour(s))   CBC With Auto Differential    Collection Time: 07/19/22  2:02 PM   Result Value Ref Range    WBC 12.2 (H) 4.3 - 11.1 K/uL    RBC 5.13 4.23 - 5.6 M/uL    Hemoglobin 15.1 13.6 - 17.2 g/dL    Hematocrit 45.3 %    MCV 88.3 79.6 - 97.8 FL    MCH 29.4 26.1 - 32.9 PG    MCHC 33.3 31.4 - 35.0 g/dL    RDW 12.8 11.9 - 14.6 %    Platelets 639 805 - 074 K/uL    MPV 8.5 (L) 9.4 - 12.3 FL    nRBC 0.00 0.0 - 0.2 K/uL    Seg Neutrophils 77 43 - 78 %    Lymphocytes 8 (L) 13 - 44 %    Monocytes 10 4.0 - 12.0 %    Eosinophils % 1 0.5 - 7.8 %    Basophils 1 0.0 - 2.0 %    Immature Granulocytes 3 0.0 - 5.0 %    Segs Absolute 9.5 (H) 1.7 - 8.2 K/UL    Absolute Lymph # 1.0 0.5 - 4.6 K/UL    Absolute Mono # 1.3 0.1 - 1.3 K/UL    Absolute Eos # 0.2 0.0 - 0.8 K/UL    Basophils Absolute 0.1 0.0 - 0.2 K/UL    Absolute Immature Granulocyte 0.3 0.0 - 0.5 K/UL    Differential Type AUTOMATED         Imaging:  No results found for this or any previous visit. ASSESSMENT/PLAN:   Diagnosis Orders   1. Seminoma ([address]        2. High risk medication use  CBC with Auto Differential    Comprehensive Metabolic Panel    Magnesium    Hepatitis B Core Antibody, IgM    Hepatitis B Core Antibody, Total    Hepatitis B Surface Antigen    Hepatitis B surface antigen confirmation reflex    Hepatitis C Antibody    Hepatitis Panel, Acute      3. Need for prophylactic chemotherapy  IR PORT PLACEMENT > 5 YEARS      4.  CINV (chemotherapy-induced nausea and vomiting)  ondansetron (ZOFRAN-ODT) 8 MG TBDP disintegrating tablet prochlorperazine (COMPAZINE) 10 MG tablet      5. Port-A-Cath in place  lidocaine-prilocaine (EMLA) 2.5-2.5 % cream      6. Pain          38 y.o. 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, he need tumor markers for further diagnosis, patient and wife are tearful and discussed that this was a potentially rather curable cancer, typically would pursue inguinal radical orchiectomy for diagnosis and local disease control, nonetheless he is a typical case radiographically if beta-hCG is highly elevated, and the severe narrowing of IVC is of clinical concern of complication unless chemotherapy is started soon, discussed with uroncology and may start chemo urgently if needed, discussed options with patient and he agrees with starting chemotherapy as soon as possible to avoid further complication, elevated beta-hCG and normal AFP consistent with seminoma, completed sperm banking and received a left radical inguinal orchiectomy on 7/7/2022 and the pathology showed pure seminoma, followed in office on 7/19/2022 and we discussed the result, beta-hCG and LDH increasing regularly despite of the resection, increased back pain, start BEP x3 ASAP, place port, arrange antiemetics and pain medicine, educated for toxicity and management, arrange baseline PFT, navigator to start chemo ASAP. All questions are answered to their satisfaction. They will call for further questions and concerns. ECOG PERFORMANCE STATUS - 0-Fully active, able to carry on all pre-disease performance without restriction. Pain - 4/10.  Mild to moderate pain, requiring

## 2022-07-21 ENCOUNTER — CLINICAL DOCUMENTATION (OUTPATIENT)
Dept: ONCOLOGY | Age: 42
End: 2022-07-21

## 2022-07-21 ENCOUNTER — CLINICAL DOCUMENTATION (OUTPATIENT)
Dept: CASE MANAGEMENT | Age: 42
End: 2022-07-21

## 2022-07-21 NOTE — PROGRESS NOTES
Attempted to call Mr. Kelly Porter on the phone number listed in Epic to do the financial counseling. Phone didn't ring. Only beeped. Attempt was made yesterday afternoon as well. Call went straight to a message stating that the voice mail had not been set up yet.

## 2022-07-22 ENCOUNTER — HOSPITAL ENCOUNTER (OUTPATIENT)
Dept: INTERVENTIONAL RADIOLOGY/VASCULAR | Age: 42
Discharge: HOME | End: 2022-07-25
Payer: COMMERCIAL

## 2022-07-22 VITALS
OXYGEN SATURATION: 97 % | DIASTOLIC BLOOD PRESSURE: 72 MMHG | TEMPERATURE: 98.6 F | SYSTOLIC BLOOD PRESSURE: 131 MMHG | HEART RATE: 79 BPM | RESPIRATION RATE: 16 BRPM

## 2022-07-22 DIAGNOSIS — Z79.899 NEED FOR PROPHYLACTIC CHEMOTHERAPY: ICD-10-CM

## 2022-07-22 LAB
GLUCOSE BLD STRIP.AUTO-MCNC: 93 MG/DL (ref 65–100)
SERVICE CMNT-IMP: NORMAL

## 2022-07-22 PROCEDURE — 2580000003 HC RX 258: Performed by: PHYSICIAN ASSISTANT

## 2022-07-22 PROCEDURE — 6360000002 HC RX W HCPCS: Performed by: PHYSICIAN ASSISTANT

## 2022-07-22 PROCEDURE — 6360000002 HC RX W HCPCS: Performed by: RADIOLOGY

## 2022-07-22 PROCEDURE — C1894 INTRO/SHEATH, NON-LASER: HCPCS

## 2022-07-22 PROCEDURE — 2500000003 HC RX 250 WO HCPCS: Performed by: PHYSICIAN ASSISTANT

## 2022-07-22 PROCEDURE — 82962 GLUCOSE BLOOD TEST: CPT

## 2022-07-22 RX ORDER — MIDAZOLAM HYDROCHLORIDE 2 MG/2ML
INJECTION, SOLUTION INTRAMUSCULAR; INTRAVENOUS
Status: COMPLETED | OUTPATIENT
Start: 2022-07-22 | End: 2022-07-22

## 2022-07-22 RX ORDER — FENTANYL CITRATE 50 UG/ML
INJECTION, SOLUTION INTRAMUSCULAR; INTRAVENOUS
Status: COMPLETED | OUTPATIENT
Start: 2022-07-22 | End: 2022-07-22

## 2022-07-22 RX ORDER — HEPARIN SODIUM (PORCINE) LOCK FLUSH IV SOLN 100 UNIT/ML 100 UNIT/ML
SOLUTION INTRAVENOUS
Status: COMPLETED | OUTPATIENT
Start: 2022-07-22 | End: 2022-07-22

## 2022-07-22 RX ORDER — LIDOCAINE HYDROCHLORIDE AND EPINEPHRINE 10; 10 MG/ML; UG/ML
INJECTION, SOLUTION INFILTRATION; PERINEURAL
Status: COMPLETED | OUTPATIENT
Start: 2022-07-22 | End: 2022-07-22

## 2022-07-22 RX ORDER — SODIUM CHLORIDE 0.9 % (FLUSH) 0.9 %
10 SYRINGE (ML) INJECTION PRN
Status: CANCELLED | OUTPATIENT
Start: 2022-07-22

## 2022-07-22 RX ORDER — SODIUM CHLORIDE 9 MG/ML
INJECTION, SOLUTION INTRAVENOUS CONTINUOUS PRN
Status: COMPLETED | OUTPATIENT
Start: 2022-07-22 | End: 2022-07-22

## 2022-07-22 RX ADMIN — FENTANYL CITRATE 50 MCG: 50 INJECTION INTRAMUSCULAR; INTRAVENOUS at 12:54

## 2022-07-22 RX ADMIN — FENTANYL CITRATE 50 MCG: 50 INJECTION INTRAMUSCULAR; INTRAVENOUS at 12:48

## 2022-07-22 RX ADMIN — SODIUM CHLORIDE 25 ML/HR: 900 INJECTION INTRAVENOUS at 12:15

## 2022-07-22 RX ADMIN — HEPARIN 500 UNITS: 100 SYRINGE at 13:04

## 2022-07-22 RX ADMIN — FENTANYL CITRATE 50 MCG: 50 INJECTION INTRAMUSCULAR; INTRAVENOUS at 12:41

## 2022-07-22 RX ADMIN — LIDOCAINE HYDROCHLORIDE,EPINEPHRINE BITARTRATE 10 ML: 10; .01 INJECTION, SOLUTION INFILTRATION; PERINEURAL at 12:56

## 2022-07-22 RX ADMIN — MIDAZOLAM HYDROCHLORIDE 1 MG: 1 INJECTION, SOLUTION INTRAMUSCULAR; INTRAVENOUS at 12:48

## 2022-07-22 RX ADMIN — MIDAZOLAM HYDROCHLORIDE 1 MG: 1 INJECTION, SOLUTION INTRAMUSCULAR; INTRAVENOUS at 12:41

## 2022-07-22 RX ADMIN — LIDOCAINE HYDROCHLORIDE,EPINEPHRINE BITARTRATE 5 ML: 10; .01 INJECTION, SOLUTION INFILTRATION; PERINEURAL at 12:53

## 2022-07-22 RX ADMIN — Medication 2000 MG: at 12:16

## 2022-07-22 RX ADMIN — MIDAZOLAM HYDROCHLORIDE 1 MG: 1 INJECTION, SOLUTION INTRAMUSCULAR; INTRAVENOUS at 12:54

## 2022-07-22 ASSESSMENT — PAIN - FUNCTIONAL ASSESSMENT: PAIN_FUNCTIONAL_ASSESSMENT: 0-10

## 2022-07-22 NOTE — OR NURSING
Recovery period without difficulty. Pt alert and oriented and denies pain. Dressing is clean, dry, and intact. Reviewed discharge instructions with patient and spouse, both verbalized understanding. Pt escorted to lobby discharge area via wheelchair. Vital signs and Elise score completed.

## 2022-07-22 NOTE — DISCHARGE INSTRUCTIONS
[facility]     Department of Interventional Radiology     Phone Number: [phone]     Fax: [phone]         If you have any questions about your procedure, please call the Interventional Radiology department at [phone]. After business hours (5pm) and weekends, call the answering service at [phone] and ask for the Radiologist on call to be paged. Si tiene Preguntas acerca del procedimiento, por favor llame al departamento de Radiología Intervencional al [phone]. Después de horas de oficina (5 pm) y los fines de [place], llamar al [place] al [phone] y pregunte por el Radiologo de [facility].

## 2022-07-22 NOTE — H&P
Department of Interventional Radiology  [phone]    History and Physical    Patient:  Farhat Stewart MRN:  [id number]  SSN:  [id number]    YOB: 1980  Age:  38 y.o. Sex:  male      Primary Care Provider:  Chey Villarreal MD  Referring Physician:  Rod Brittle, MD    Subjective:     Chief Complaint: testicular cancer    History of the Present Illness: The patient is a 38 y.o. male who presents for venous chest port placement. NPO. Past Medical History:   Diagnosis Date    Benign essential hypertension     controlled with med    Diabetes mellitus type 2, noninsulin dependent ([address]     oral reliant; AVG -105; pt does have s.s of hypoglycemia but does not know what BS is when it gets low; last A1C    Fatty liver     per pt    Hyperlipidemia     managed with med    Testicular mass     Left     Past Surgical History:   Procedure Laterality Date    APPENDECTOMY      KIDNEY STONE SURGERY Right 2011    TESTICLE REMOVAL Left 7/7/2022    LEFT RADICAL ORCHIECTOMY Judyann Milling APPROACH performed by Missy Moncada MD at [facility] MAIN OR        Review of Systems:    Pertinent items are noted in HPI. Prior to Admission medications    Medication Sig Start Date End Date Taking? Authorizing Provider   lidocaine-prilocaine (EMLA) 2.5-2.5 % cream Apply topically as needed.  7/19/22   Devon Borden MD   ondansetron (ZOFRAN-ODT) 8 MG TBDP disintegrating tablet Place 1 tablet under the tongue every 8 hours as needed for Nausea or Vomiting 7/19/22   Devon Borden MD   prochlorperazine (COMPAZINE) 10 MG tablet Take 1 tablet by mouth every 6 hours as needed (cinv) 7/19/22   Devon Borden MD   sennosides-docusate sodium (SENOKOT-S) 8.6-50 MG tablet Take 1 tablet by mouth daily as needed for Constipation  Patient not taking: No sig reported 7/7/22   Missy Moncada MD   telmisartan-amLODIPine (TWYNSTA) 80-5 MG TABS tablet Take 1 tablet by mouth daily    Historical Provider, MD   atenolol (TENORMIN) 50 MG tablet Take 50 mg by mouth daily 6/29/22   Historical Provider, MD   atorvastatin (LIPITOR) 20 MG tablet Take 20 mg by mouth nightly 3/21/22   Historical Provider, MD   metFORMIN (GLUCOPHAGE-XR) 500 MG extended release tablet Take 2 with breakfast and supper - Diabetes 6/29/22   Historical Provider, MD   spironolactone (ALDACTONE) 50 MG tablet Take 50 mg by mouth daily 3/21/22   Historical Provider, MD        No Known Allergies    No family history on file. Social History     Tobacco Use    Smoking status: Never    Smokeless tobacco: Never   Substance Use Topics    Alcohol use: Yes     Comment: one or two per week        Not in a hospital admission.     Objective:       Physical Examination:    Vitals:    07/22/22 1217   BP: 137/82   Pulse: 72   Resp: 16   Temp: 98.6 °F (37 °C)   TempSrc: Oral   SpO2: 97%       Pain Assessment  Pain Level: 6  Pain Location: Back          Pain Level: 6       Pain Location: Back         Goal 0  Interv: per primary                      HEART: regular rate and rhythm  LUNG: clear to auscultation bilaterally  ABDOMEN: normal findings: soft, non-tender  EXTREMITIES: warm,no edema    Laboratory:     Lab Results   Component Value Date/Time     07/10/2022 02:02 PM     07/05/2022 07:01 AM    K 4.1 07/10/2022 02:02 PM    K 3.7 07/05/2022 07:01 AM     07/10/2022 02:02 PM     07/05/2022 07:01 AM    CO2 26 07/10/2022 02:02 PM    CO2 28 07/05/2022 07:01 AM    BUN 11 07/10/2022 02:02 PM    BUN 13 07/05/2022 07:01 AM    GFRAA >60 07/10/2022 02:02 PM    GFRAA >60 07/05/2022 07:01 AM    MG 2.1 07/04/2022 06:14 AM    GLOB 5.0 07/10/2022 02:02 PM    GLOB 4.6 07/05/2022 07:01 AM    ALT 56 07/10/2022 02:02 PM    ALT 56 07/05/2022 07:01 AM     Lab Results   Component Value Date/Time    WBC 12.2 07/19/2022 02:02 PM    WBC 7.9 07/10/2022 02:02 PM    HGB 15.1 07/19/2022 02:02 PM    HGB 14.7 07/10/2022 02:02 PM    HCT 45.3 07/19/2022 02:02 PM HCT 42.8 07/10/2022 02:02 PM     07/19/2022 02:02 PM     07/10/2022 02:02 PM     No results found for: APTT, INR    Assessment:     Testicular cancer    [unfilled]    Plan:     Planned Procedure:  port placement    Risks, benefits, and alternatives reviewed with patient and he agrees to proceed with the procedure.       Signed By: Estefani Nolan PA-C     July 22, 2022

## 2022-07-22 NOTE — OP NOTE
Department of Interventional Radiology  [phone]        Interventional Radiology Brief Procedure Note    Patient: Frankie Angel MRN: [id number]  SSN: [id number]    YOB: 1980  Age: 38 y.o.   Sex: male      Date of Procedure: 7/22/2022    Pre-Procedure Diagnosis: testicular cancer    Post-Procedure Diagnosis: SAME    Procedure(s): Venous Chest Port Placement    Brief Description of Procedure: as above    Performed By: Gutierrez Chester PA-C     Assistants: None    Anesthesia:Moderate Sedation per Jasmina Pacheco MD    Estimated Blood Loss: Less than 10ml    Specimens:  None    Implants:  Subcutaneous Port    Findings: catheter tip in right atrium     Complications: None    Recommendations: ok to use port     Follow Up: prn    Signed By: Gutierrez Chester PA-C     July 22, 2022

## 2022-07-25 ENCOUNTER — HOSPITAL ENCOUNTER (OUTPATIENT)
Dept: INFUSION THERAPY | Age: 42
Discharge: HOME | End: 2022-07-25
Payer: COMMERCIAL

## 2022-07-25 ENCOUNTER — CLINICAL DOCUMENTATION (OUTPATIENT)
Dept: CASE MANAGEMENT | Age: 42
End: 2022-07-25

## 2022-07-25 ENCOUNTER — OFFICE VISIT (OUTPATIENT)
Dept: ONCOLOGY | Age: 42
End: 2022-07-25
Payer: COMMERCIAL

## 2022-07-25 VITALS
HEART RATE: 82 BPM | HEIGHT: 73 IN | OXYGEN SATURATION: 98 % | TEMPERATURE: 98.6 F | SYSTOLIC BLOOD PRESSURE: 134 MMHG | DIASTOLIC BLOOD PRESSURE: 89 MMHG | BODY MASS INDEX: 32.52 KG/M2 | RESPIRATION RATE: 16 BRPM | WEIGHT: 245.38 LBS

## 2022-07-25 DIAGNOSIS — Z79.899 HIGH RISK MEDICATION USE: ICD-10-CM

## 2022-07-25 DIAGNOSIS — C62.90 SEMINOMA (HCC): Primary | ICD-10-CM

## 2022-07-25 DIAGNOSIS — Z79.899 NEED FOR PROPHYLACTIC CHEMOTHERAPY: ICD-10-CM

## 2022-07-25 DIAGNOSIS — C62.90 SEMINOMA (HCC): ICD-10-CM

## 2022-07-25 DIAGNOSIS — R52 PAIN: ICD-10-CM

## 2022-07-25 LAB
AFP-TM SERPL-MCNC: 1.7 NG/ML
ALBUMIN SERPL-MCNC: 3.7 G/DL (ref 3.5–5)
ALBUMIN/GLOB SERPL: 0.8 {RATIO} (ref 1.2–3.5)
ALP SERPL-CCNC: 207 U/L (ref 50–136)
ALT SERPL-CCNC: 25 U/L (ref 12–65)
ANION GAP SERPL CALC-SCNC: 10 MMOL/L (ref 7–16)
AST SERPL-CCNC: 14 U/L (ref 15–37)
BASOPHILS # BLD: 0.1 K/UL (ref 0–0.2)
BASOPHILS NFR BLD: 0 % (ref 0–2)
BILIRUB SERPL-MCNC: 0.5 MG/DL (ref 0.2–1.1)
BUN SERPL-MCNC: 23 MG/DL (ref 6–23)
CALCIUM SERPL-MCNC: 9.3 MG/DL (ref 8.3–10.4)
CHLORIDE SERPL-SCNC: 102 MMOL/L (ref 98–107)
CO2 SERPL-SCNC: 23 MMOL/L (ref 21–32)
CREAT SERPL-MCNC: 1.3 MG/DL (ref 0.8–1.5)
DIFFERENTIAL METHOD BLD: ABNORMAL
EOSINOPHIL # BLD: 0 K/UL (ref 0–0.8)
EOSINOPHIL NFR BLD: 0 % (ref 0.5–7.8)
ERYTHROCYTE [DISTWIDTH] IN BLOOD BY AUTOMATED COUNT: 12.7 % (ref 11.9–14.6)
GLOBULIN SER CALC-MCNC: 4.9 G/DL (ref 2.3–3.5)
GLUCOSE SERPL-MCNC: 211 MG/DL (ref 65–100)
HAV IGM SER QL: NONREACTIVE
HBV CORE IGM SER QL: NONREACTIVE
HBV SURFACE AG SER QL: NONREACTIVE
HCG SERPL-ACNC: 280 MIU/ML (ref 0–2)
HCT VFR BLD AUTO: 44.5 %
HCV AB SER QL: NONREACTIVE
HGB BLD-MCNC: 15.8 G/DL (ref 13.6–17.2)
IMM GRANULOCYTES # BLD AUTO: 0.2 K/UL (ref 0–0.5)
IMM GRANULOCYTES NFR BLD AUTO: 1 % (ref 0–5)
LDH SERPL L TO P-CCNC: 724 U/L (ref 100–190)
LYMPHOCYTES # BLD: 0.8 K/UL (ref 0.5–4.6)
LYMPHOCYTES NFR BLD: 4 % (ref 13–44)
MAGNESIUM SERPL-MCNC: 2.3 MG/DL (ref 1.8–2.4)
MCH RBC QN AUTO: 29.6 PG (ref 26.1–32.9)
MCHC RBC AUTO-ENTMCNC: 35.5 G/DL (ref 31.4–35)
MCV RBC AUTO: 83.3 FL (ref 79.6–97.8)
MONOCYTES # BLD: 1.1 K/UL (ref 0.1–1.3)
MONOCYTES NFR BLD: 6 % (ref 4–12)
NEUTS SEG # BLD: 17.3 K/UL (ref 1.7–8.2)
NEUTS SEG NFR BLD: 89 % (ref 43–78)
NRBC # BLD: 0 K/UL (ref 0–0.2)
PLATELET # BLD AUTO: 376 K/UL (ref 150–450)
PMV BLD AUTO: 8.6 FL (ref 9.4–12.3)
POTASSIUM SERPL-SCNC: 4 MMOL/L (ref 3.5–5.1)
PROT SERPL-MCNC: 8.6 G/DL (ref 6.3–8.2)
RBC # BLD AUTO: 5.34 M/UL (ref 4.23–5.6)
SODIUM SERPL-SCNC: 135 MMOL/L (ref 136–145)
WBC # BLD AUTO: 19.5 K/UL (ref 4.3–11.1)

## 2022-07-25 PROCEDURE — 6360000002 HC RX W HCPCS: Performed by: INTERNAL MEDICINE

## 2022-07-25 PROCEDURE — 96411 CHEMO IV PUSH ADDL DRUG: CPT

## 2022-07-25 PROCEDURE — 96375 TX/PRO/DX INJ NEW DRUG ADDON: CPT

## 2022-07-25 PROCEDURE — 87340 HEPATITIS B SURFACE AG IA: CPT

## 2022-07-25 PROCEDURE — 83615 LACTATE (LD) (LDH) ENZYME: CPT

## 2022-07-25 PROCEDURE — 85025 COMPLETE CBC W/AUTO DIFF WBC: CPT

## 2022-07-25 PROCEDURE — 84702 CHORIONIC GONADOTROPIN TEST: CPT

## 2022-07-25 PROCEDURE — 99215 OFFICE O/P EST HI 40 MIN: CPT | Performed by: INTERNAL MEDICINE

## 2022-07-25 PROCEDURE — 6370000000 HC RX 637 (ALT 250 FOR IP): Performed by: INTERNAL MEDICINE

## 2022-07-25 PROCEDURE — 86704 HEP B CORE ANTIBODY TOTAL: CPT

## 2022-07-25 PROCEDURE — 80074 ACUTE HEPATITIS PANEL: CPT

## 2022-07-25 PROCEDURE — 2580000003 HC RX 258: Performed by: INTERNAL MEDICINE

## 2022-07-25 PROCEDURE — 96367 TX/PROPH/DG ADDL SEQ IV INF: CPT

## 2022-07-25 PROCEDURE — 96366 THER/PROPH/DIAG IV INF ADDON: CPT

## 2022-07-25 PROCEDURE — 80053 COMPREHEN METABOLIC PANEL: CPT

## 2022-07-25 PROCEDURE — 96417 CHEMO IV INFUS EACH ADDL SEQ: CPT

## 2022-07-25 PROCEDURE — 83735 ASSAY OF MAGNESIUM: CPT

## 2022-07-25 PROCEDURE — 36591 DRAW BLOOD OFF VENOUS DEVICE: CPT

## 2022-07-25 PROCEDURE — 82105 ALPHA-FETOPROTEIN SERUM: CPT

## 2022-07-25 PROCEDURE — 96413 CHEMO IV INFUSION 1 HR: CPT

## 2022-07-25 RX ORDER — SODIUM CHLORIDE 0.9 % (FLUSH) 0.9 %
5-40 SYRINGE (ML) INJECTION PRN
Status: DISCONTINUED | OUTPATIENT
Start: 2022-07-25 | End: 2022-07-26 | Stop reason: HOSPADM

## 2022-07-25 RX ORDER — HYDROCODONE BITARTRATE AND ACETAMINOPHEN 7.5; 325 MG/1; MG/1
TABLET ORAL
COMMUNITY
Start: 2022-07-22 | End: 2022-09-06

## 2022-07-25 RX ORDER — DIPHENHYDRAMINE HYDROCHLORIDE 50 MG/ML
25 INJECTION INTRAMUSCULAR; INTRAVENOUS ONCE
Status: COMPLETED | OUTPATIENT
Start: 2022-07-25 | End: 2022-07-25

## 2022-07-25 RX ORDER — ONDANSETRON 2 MG/ML
8 INJECTION INTRAMUSCULAR; INTRAVENOUS ONCE
Status: COMPLETED | OUTPATIENT
Start: 2022-07-25 | End: 2022-07-25

## 2022-07-25 RX ORDER — SODIUM CHLORIDE 9 MG/ML
5-250 INJECTION, SOLUTION INTRAVENOUS PRN
Status: DISCONTINUED | OUTPATIENT
Start: 2022-07-25 | End: 2022-07-26 | Stop reason: HOSPADM

## 2022-07-25 RX ORDER — DIPHENHYDRAMINE HYDROCHLORIDE 50 MG/ML
50 INJECTION INTRAMUSCULAR; INTRAVENOUS
Status: DISPENSED | OUTPATIENT
Start: 2022-07-25 | End: 2022-07-25

## 2022-07-25 RX ORDER — ACETAMINOPHEN 325 MG/1
650 TABLET ORAL ONCE
Status: COMPLETED | OUTPATIENT
Start: 2022-07-25 | End: 2022-07-25

## 2022-07-25 RX ADMIN — DIPHENHYDRAMINE HYDROCHLORIDE 25 MG: 50 INJECTION, SOLUTION INTRAMUSCULAR; INTRAVENOUS at 12:06

## 2022-07-25 RX ADMIN — SODIUM CHLORIDE, PRESERVATIVE FREE 10 ML: 5 INJECTION INTRAVENOUS at 18:00

## 2022-07-25 RX ADMIN — SODIUM CHLORIDE 30 UNITS: 900 INJECTION, SOLUTION INTRAVENOUS at 13:58

## 2022-07-25 RX ADMIN — POTASSIUM CHLORIDE: 2 INJECTION, SOLUTION, CONCENTRATE INTRAVENOUS at 12:50

## 2022-07-25 RX ADMIN — ETOPOSIDE 246 MG: 20 INJECTION INTRAVENOUS at 14:25

## 2022-07-25 RX ADMIN — SODIUM CHLORIDE, PRESERVATIVE FREE 10 ML: 5 INJECTION INTRAVENOUS at 10:16

## 2022-07-25 RX ADMIN — ONDANSETRON 8 MG: 2 INJECTION INTRAMUSCULAR; INTRAVENOUS at 12:04

## 2022-07-25 RX ADMIN — SODIUM CHLORIDE, PRESERVATIVE FREE 10 ML: 5 INJECTION INTRAVENOUS at 11:40

## 2022-07-25 RX ADMIN — SODIUM CHLORIDE 25 ML/HR: 9 INJECTION, SOLUTION INTRAVENOUS at 11:45

## 2022-07-25 RX ADMIN — POTASSIUM CHLORIDE: 2 INJECTION, SOLUTION, CONCENTRATE INTRAVENOUS at 16:48

## 2022-07-25 RX ADMIN — ACETAMINOPHEN 650 MG: 325 TABLET ORAL at 12:03

## 2022-07-25 RX ADMIN — FOSAPREPITANT 150 MG: 150 INJECTION, POWDER, LYOPHILIZED, FOR SOLUTION INTRAVENOUS at 12:16

## 2022-07-25 RX ADMIN — CISPLATIN 49 MG: 1 INJECTION, SOLUTION INTRAVENOUS at 15:40

## 2022-07-25 NOTE — PROGRESS NOTES
Patient ambulatory to port lab. Port accessed and labs drawn per protocol. Port remains accessed. Patient discharged ambulatory.

## 2022-07-25 NOTE — PROGRESS NOTES
encounter. Preference in learning style assessed as visual, written and verbal.  Patient acknowledged good readiness to learn by responding appropriately to open ended questions about chemo education, disease process, treatment plan and possible side effects, etc.  Patient offered feedback on learning and the educational encounter. Patient acknowledges the importance of and agrees to adhering to the treatment plan regimen. Upcoming appointments for  were reviewed with the patient. Time was provided for the patient to ask questions. Familia Alvardao verbalized understanding of the treatment plan. Vitals: There were no vitals filed for this visit. Informed Consent for Administration of Chemotherapy or Biotherapy for Annette Pozo was signed and scanned into Epic under the Media tab. Total time spent for chemo education/counselin minutes, 100% in direct counseling.

## 2022-07-25 NOTE — PROGRESS NOTES
7/25/22 saw pt today with Dr. Amie Rivas for pre chemo cycle 1 BEP for testicular cancer. See pt instructions for symptom management. Will arrange PFT, may proceed without it. He has seen [facility] for banking. Provided opportunity to ask questions and all were discussed. Proceed to infusion. Follow up in 1 week. Encouraged to call with any concerns. Navigation will continue to follow.

## 2022-07-25 NOTE — PATIENT INSTRUCTIONS
Patient Instructions from Today's Visit    Reason for Visit:  Pre chemo cycle 1 BEP    Plan:  Proceed to infusion   See below for symptom management  Make sure you wear sunscreen when outside    Follow Up:  1 week    Recent Lab Results:  Hospital Outpatient Visit on 07/25/2022   Component Date Value Ref Range Status    WBC 07/25/2022 19.5 (A) 4.3 - 11.1 K/uL Final    RBC 07/25/2022 5.34  4.23 - 5.6 M/uL Final    Hemoglobin 07/25/2022 15.8  13.6 - 17.2 g/dL Final    Hematocrit 07/25/2022 44.5  % Final    MCV 07/25/2022 83.3  79.6 - 97.8 FL Final    MCH 07/25/2022 29.6  26.1 - 32.9 PG Final    MCHC 07/25/2022 35.5 (A) 31.4 - 35.0 g/dL Final    RDW 07/25/2022 12.7  11.9 - 14.6 % Final    Platelets 26/16/3635 376  150 - 450 K/uL Final    MPV 07/25/2022 8.6 (A) 9.4 - 12.3 FL Final    nRBC 07/25/2022 0.00  0.0 - 0.2 K/uL Final    **Note: Absolute NRBC parameter is now reported with Hemogram**    Differential Type 07/25/2022 AUTOMATED    Final    Seg Neutrophils 07/25/2022 89 (A) 43 - 78 % Final    Lymphocytes 07/25/2022 4 (A) 13 - 44 % Final    Monocytes 07/25/2022 6  4.0 - 12.0 % Final    Eosinophils % 07/25/2022 0 (A) 0.5 - 7.8 % Final    Basophils 07/25/2022 0  0.0 - 2.0 % Final    Immature Granulocytes 07/25/2022 1  0.0 - 5.0 % Final    Segs Absolute 07/25/2022 17.3 (A) 1.7 - 8.2 K/UL Final    Absolute Lymph # 07/25/2022 0.8  0.5 - 4.6 K/UL Final    Absolute Mono # 07/25/2022 1.1  0.1 - 1.3 K/UL Final    Absolute Eos # 07/25/2022 0.0  0.0 - 0.8 K/UL Final    Basophils Absolute 07/25/2022 0.1  0.0 - 0.2 K/UL Final    Absolute Immature Granulocyte 07/25/2022 0.2  0.0 - 0.5 K/UL Final    Sodium 07/25/2022 135 (A) 136 - 145 mmol/L Final    Potassium 07/25/2022 4.0  3.5 - 5.1 mmol/L Final    Chloride 07/25/2022 102  98 - 107 mmol/L Final    CO2 07/25/2022 23  21 - 32 mmol/L Final    Anion Gap 07/25/2022 10  7 - 16 mmol/L Final    Glucose 07/25/2022 211 (A) 65 - 100 mg/dL Final    BUN 07/25/2022 23  6 - 23 MG/DL Final desk.    Daniela Madden RN, BSN  Nurse Navigator  [phone] live [email or web address]    Nutrition and hydration  Fluid intake is very important  Fluids include - water, flavored water, juice, popsicles, italian ice, broth, milkshakes, etc.  Avoid caffeine drinks due to diuretic effect. You can have some but please limit the amount. No food restrictions. Eat what tastes good and you enjoy  Frequent small/snack size meals  If appetite decreases, use supplement shakes to help get your calories in  [place] instant breakfast mixed with 8 oz of whole milk. Their web site has recipe idea to help boost calories in shakes. This is equal to ensure or boost, but tastes better and is less expensive. Protein intake is very important also. Use protein shakes or powders. Use plastic utensils to help decrease metallic taste in mouth    Use of nausea medications  Rest in a chair after meals; don't lie flat for at least 2 hours  Avoid fatty, spicy, fried and highly sweet foods  Eat bland foods and drink plenty of fluids  Eat smaller, frequent snack size meals  Avoid food aromas and strong smells that trigger nausea  It is important to take anti-nausea medications as prescribed   Take zofran every 8 hours around the clock for several days after chemo to help control nausea (morning, mid-day, and bedtime). This is good at preventing nausea from starting. If you have additional nausea, compazine and/or ativan can be taken in between zofran doses  Do not stop zofran, just take compazine in addition to zofran  If nausea is still not controlled please call, there are other medications we can try  Please don't think you should have nausea because you are receiving chemo. There are other medications we can give you to help.     Constipation  Pain medications, nausea medications and chemo can possibly cause constipation so watch for this very closely  Eat more high fiber foods   Drink at least 8 cups of water or other fluids each day  Prune juice, hot tea, or coffee can sometimes help stimulate bowel movements  Over the counter stool softeners - colace, Senokot S, etc  Take 1-2 tablets/capsules 1-2 times per day  May take Miralax in addition to stool softeners if needed. Controlling this is different for everyone. You will have to play with the medications to figure out what works for you. Diarrhea  Loose or watery bowel movements more than 5 times a day  Make sure you are drinking plenty of fluids to help prevent you from getting dehydrated. Normally need at least 64 ounces of fluids each day. Now you need more to replace all fluids lost with diarrhea. Avoid foods containing roughage and bulk (bran, whole grain cereals, dried fruit, and vegetables)  Avoid milk and milk products if they make diarrhea worse  Eat BRAT diet - bananas, rice, applesauce and toast  Eat foods high in potassium (bananas, oranges, baked potatoes, broccoli, mushrooms and apricots)   Eat small frequent snack size meals  Take anti-diarrhea medications as prescribed. Over the counter imodium/loperamide, take 1 - 2 tablets after each loose stool. No more than 8 tablets in a day  If imodium is not controlling diarrhea please call, there is a prescription medication that can be taken with imodium. Things You Can Do To Manage Taste Changes:  1. Maintain good oral hygiene. 2. Choose and prepare foods that look and smell good to you. 3. Eat small, frequent meals. 4. Use plastic utensils if food tastes like metal.  5. Eat in pleasant surroundings to better manage taste changes. 6. Increase your fluid intake. There is no one magic solution for taste changes that suits everyone. Finding foods that taste appealing may be a process of trial and error. Mouth care  Check with your doctor before having any dental work done. Chemo can cause you to develop mouth sores or tenderness  Avoid spicy, acidic, salty, crunchy foods or beverages.   Eat soft foods. Use soft toothbrush and brush after every meal   Use alcohol free mouthwash and toothpaste which can be irritating to mouth  Biotene mouthwash is a good option                Warm salt water and/or baking soda rinses several times a day, especially after eating to keep mouth clean. 1/2 tsp salt + 1/2 tsp baking soda in 8 oz of water. If mouth sores continue call, there are prescription mouth washes we can call in to your pharmacy  Dry mouth - over the counter xylimelts or mouth kote  Drink plenty of fluids to keep mouth moist.  Hard candies can help also. Oral thrush is common also when receiving chemo. This is a white coating of the mouth and tongue. Call if you have these symptoms because this will require a prescription to clear up.

## 2022-07-25 NOTE — PROGRESS NOTES
Arrived to the [facility]. C1D1  Bleomycin, Cisplatin and Etoposide w/pre and post hydration completed. Patient tolerated well. Any issues or concerns during appointment: no.  Patient aware of next infusion appointment on 7/26/2022 (date) at [facility] (time). Patient aware of next lab and [facility] office visit on 8/1/2022 (date) at [phone] (time). Patient instructed to call provider with temperature of 100.4 or greater or nausea/vomiting/ diarrhea or pain not controlled by medications  Discharged ambulatory.

## 2022-07-25 NOTE — PROGRESS NOTES
3 [facility] Hematology & Oncology: Office Visit Progress Note    Chief Complaint:    Testicular cancer    History of Present Illness:  38 y.o. male past medical history of hypertension, hyperlipidemia, diabetes, presented to the emergency room with testicle swelling and lower back and abdominal pain. Reported low back pain intermittently over the past month, did not have work-up yet. Now he noticed testicular swelling with pain and presented to the emergency room for further evaluation. Scrotal ultrasound showed large left side testicular mass highly concerning for testicular malignancy measuring 4.4 x 3.7 x 3.9 cm. CT showed massive retroperitoneal lymph nodes conglomerates and a largest retrocrural lymph nodes suspicious for lymphoma versus metastatic disease. There is severe luminal narrowing of the IVC given the mass-effect upon the IVC and mass-effect upon the abdominal aorta as well as the iliopsoas musculature. Oncology is consulted for recommendation. 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 Review of Systems:  Constitutional Denies fever or chills. Denies weight loss or appetite changes. Denies fatigue. Denies anorexia. HEENT Denies trauma, bluring vision, hearing loss, ear pain, nosebleeds, sore throat, neck pain and ear discharge. Skin Denies lesions or rashes. Lungs Denies shortness of breath, cough, sputum production or hemoptysis. Cardiovascular Denies chest pain, palpitations, orthopnea, claudication and leg swelling. Gastrointestinal Denies nausea, vomiting, bowel changes. Denies bloody or black stools. Denies abdominal pain.  Status post left orchiectomy. Denies dysuria, frequency or hesitancy of urination   Neuro Denies headaches, visual changes or ataxia. Denies dizziness, tingling, tremors, sensory change, speech change, focal weakness and headaches.      Hematology Denies nasal/gum bleeding, denies easy bruise   Endo Denies heat/cold intolerance, denies diabetes. MSK Back pain. Denies swollen legs, myalgias and falls. Psychiatric/Behavioral Denies depression and substance abuse. The patient is not nervous/anxious. No Known Allergies  Past Medical History:   Diagnosis Date    Benign essential hypertension     controlled with med    Diabetes mellitus type 2, noninsulin dependent ([address]     oral reliant; AVG -105; pt does have s.s of hypoglycemia but does not know what BS is when it gets low; last A1C    Fatty liver     per pt    Hyperlipidemia     managed with med    Testicular mass     Left     Past Surgical History:   Procedure Laterality Date    APPENDECTOMY      IR PORT PLACEMENT EQUAL OR GREATER THAN 5 YEARS  7/22/2022    IR PORT PLACEMENT EQUAL OR GREATER THAN 5 YEARS 7/22/2022 [facility] SPE[facility]    KIDNEY STONE SURGERY Right 2011    TESTICLE REMOVAL Left 7/7/2022    LEFT RADICAL ORCHIECTOMY Windy Bhupinder APPROACH performed by Nancy Lorenzo MD at [facility] MAIN OR     No family history on file.   Social History     Socioeconomic History    Marital status:      Spouse name: Not on file    Number of children: Not on file    Years of education: Not on file    Highest education level: Not on file   Occupational History    Not on file   Tobacco Use    Smoking status: Never    Smokeless tobacco: Never   Vaping Use    Vaping Use: Never used   Substance and Sexual Activity    Alcohol use: Yes     Comment: one or two per week    Drug use: Never    Sexual activity: Not on file   Other Topics Concern    Not on file   Social History Narrative    Not on file     Social Determinants of Health     Financial Resource Strain: Not on file   Food Insecurity: Not on file   Transportation Needs: Not on file   Physical Activity: Not on file   Stress: Not on file   Social Connections: Not on file   Intimate Partner Violence: Not on file   Housing Stability: Not on file Current Outpatient Medications   Medication Sig Dispense Refill    HYDROcodone-acetaminophen (NORCO) 7.5-325 MG per tablet PLEASE SEE ATTACHED FOR DETAILED DIRECTIONS      lidocaine-prilocaine (EMLA) 2.5-2.5 % cream Apply topically as needed. 1 each 2    ondansetron (ZOFRAN-ODT) 8 MG TBDP disintegrating tablet Place 1 tablet under the tongue every 8 hours as needed for Nausea or Vomiting 90 tablet 2    prochlorperazine (COMPAZINE) 10 MG tablet Take 1 tablet by mouth every 6 hours as needed (cinv) 90 tablet 2    telmisartan-amLODIPine (TWYNSTA) 80-5 MG TABS tablet Take 1 tablet by mouth daily      atenolol (TENORMIN) 50 MG tablet Take 50 mg by mouth daily      atorvastatin (LIPITOR) 20 MG tablet Take 20 mg by mouth nightly      metFORMIN (GLUCOPHAGE-XR) 500 MG extended release tablet Take 2 with breakfast and supper - Diabetes      spironolactone (ALDACTONE) 50 MG tablet Take 50 mg by mouth daily      sennosides-docusate sodium (SENOKOT-S) 8.6-50 MG tablet Take 1 tablet by mouth daily as needed for Constipation (Patient not taking: No sig reported) 30 tablet 0     No current facility-administered medications for this visit. Facility-Administered Medications Ordered in Other Visits   Medication Dose Route Frequency Provider Last Rate Last Admin    sodium chloride flush 0.9 % injection 5-40 mL  5-40 mL IntraVENous PRN Daniel Kirk MD   10 mL at 07/25/22 1016       OBJECTIVE:  /89 (Site: Left Upper Arm, Position: Sitting, Cuff Size: Large Adult)   Pulse 82   Temp 98.6 °F (37 °C)   Resp 16   Ht 6' 1\" (1.854 m)   Wt 245 lb 6 oz (111.3 kg)   SpO2 98%   BMI 32.37 kg/m²     Physical Exam:  Constitutional: Oriented to person, place, and time. Well-developed and well-nourished. HEENT: Normocephalic and atraumatic. Oropharynx is clear and moist.   Conjunctivae and EOM are normal. Pupils are equal, round, and reactive to light. No scleral icterus. Neck supple. No JVD present.   No tracheal deviation present. No thyromegaly present. Lymph node No palpable submandibular, cervical, supraclavicular, axillary and inguinal lymph nodes. Skin Warm and dry. No bruising and no rash noted. No erythema. No pallor. Respiratory Effort normal and breath sounds normal.  No respiratory distress. No wheezes. No rales. No tenderness. CVS Normal rate, regular rhythm and normal heart sounds. Exam reveals no gallop, no friction and no rub. No murmur heard. Abdomen Status post left orchiectomy. Right testis soft nontender no mass. Soft. Bowel sounds are normal. Exhibits no distension. There is no tenderness. There is no rebound and no guarding. Neuro Normal reflexes. No cranial nerve deficit. Exhibits normal muscle tone, 5 of 5 strength of all extremities. MSK Normal range of motion in general.  No edema and no tenderness.    Psych Normal mood, affect, behavior, judgment and thought content      Labs:  Recent Results (from the past 24 hour(s))   CBC with Auto Differential    Collection Time: 07/25/22 10:02 AM   Result Value Ref Range    WBC 19.5 (H) 4.3 - 11.1 K/uL    RBC 5.34 4.23 - 5.6 M/uL    Hemoglobin 15.8 13.6 - 17.2 g/dL    Hematocrit 44.5 %    MCV 83.3 79.6 - 97.8 FL    MCH 29.6 26.1 - 32.9 PG    MCHC 35.5 (H) 31.4 - 35.0 g/dL    RDW 12.7 11.9 - 14.6 %    Platelets 636 136 - 837 K/uL    MPV 8.6 (L) 9.4 - 12.3 FL    nRBC 0.00 0.0 - 0.2 K/uL    Differential Type AUTOMATED      Seg Neutrophils 89 (H) 43 - 78 %    Lymphocytes 4 (L) 13 - 44 %    Monocytes 6 4.0 - 12.0 %    Eosinophils % 0 (L) 0.5 - 7.8 %    Basophils 0 0.0 - 2.0 %    Immature Granulocytes 1 0.0 - 5.0 %    Segs Absolute 17.3 (H) 1.7 - 8.2 K/UL    Absolute Lymph # 0.8 0.5 - 4.6 K/UL    Absolute Mono # 1.1 0.1 - 1.3 K/UL    Absolute Eos # 0.0 0.0 - 0.8 K/UL    Basophils Absolute 0.1 0.0 - 0.2 K/UL    Absolute Immature Granulocyte 0.2 0.0 - 0.5 K/UL   Comprehensive Metabolic Panel    Collection Time: 07/25/22 10:02 AM   Result Value Ref Range    Sodium 135 (L) 136 - 145 mmol/L    Potassium 4.0 3.5 - 5.1 mmol/L    Chloride 102 98 - 107 mmol/L    CO2 23 21 - 32 mmol/L    Anion Gap 10 7 - 16 mmol/L    Glucose 211 (H) 65 - 100 mg/dL    BUN 23 6 - 23 MG/DL    Creatinine 1.30 0.8 - 1.5 MG/DL    GFR African American >60 >60 ml/min/1.73m2    GFR Non- >60 >60 ml/min/1.73m2    Calcium 9.3 8.3 - 10.4 MG/DL    Total Bilirubin 0.5 0.2 - 1.1 MG/DL    ALT 25 12 - 65 U/L    AST 14 (L) 15 - 37 U/L    Alk Phosphatase 207 (H) 50 - 136 U/L    Total Protein 8.6 (H) 6.3 - 8.2 g/dL    Albumin 3.7 3.5 - 5.0 g/dL    Globulin 4.9 (H) 2.3 - 3.5 g/dL    Albumin/Globulin Ratio 0.8 (L) 1.2 - 3.5     Magnesium    Collection Time: 07/25/22 10:02 AM   Result Value Ref Range    Magnesium 2.3 1.8 - 2.4 mg/dL       Imaging:  No results found for this or any previous visit. ASSESSMENT/PLAN:   Diagnosis Orders   1. Seminoma (HCC)  AFP Tumor Marker    HCG Qualitative, Serum    Lactate Dehydrogenase    CBC with Auto Differential    Comprehensive Metabolic Panel    HCG, Quantitative, Pregnancy      2. Need for prophylactic chemotherapy  Full PFT Study Without Bronchdilator      3. High risk medication use  Full PFT Study Without Bronchdilator      4.  Pain          38 y.o. 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, he need tumor markers for further diagnosis, patient and wife are tearful and discussed that this was a potentially rather curable cancer, typically would pursue inguinal radical orchiectomy for diagnosis and local disease control, nonetheless he is a typical case radiographically if beta-hCG is highly elevated, and the severe narrowing of IVC is of clinical concern of complication unless chemotherapy is started soon, discussed with uroncology and may start chemo urgently if needed, discussed options with patient and he agrees with starting chemotherapy as soon as possible to avoid further complication, elevated beta-hCG and normal AFP consistent with seminoma, completed sperm banking and received a left radical inguinal orchiectomy on 7/7/2022 and the pathology showed pure seminoma, followed in office on 7/19/2022 and we discussed the result, beta-hCG and LDH increasing regularly despite of the resection, increased back pain and night sweat, placed port and start BEP x3 7/25/2022, baseline PFT still pending and arrange ASAP, return next week for day 8 bleomycin, educated to monitor short of breath/coughing/neuropathy, [place] [facility] as needed, call as needed. All questions are answered to their satisfaction. They will call for further questions and concerns. ECOG PERFORMANCE STATUS - 0-Fully active, able to carry on all pre-disease performance without restriction. Pain - 0 - No pain/10. Mild to moderate pain, requiring medication - see MAR     Fatigue - No flowsheet data found. Distress - No flowsheet data found. Total time independently spent on today's visit was 40min. This time included: face-to-face time evaluating the patient as well as additional non-face-to-face time spent on: Preparing to see the patient by obtaining and reviewing previous test results, records and medical history, Performing a medically appropriate history and exam and documenting relevant clinical information for this visit, Counseling and educating patient and family, Ordering medications, Communicating with other health care professionals and Referring patient to another health care provider. Elements of this note have been dictated via voice recognition software.   Text and phrases may be limited by the accuracy and autoconversion of the software. The chart has been reviewed, but errors may still be present. Daylin Schultz M.D.   [address]  Office : [phone]  Fax : [phone]

## 2022-07-26 ENCOUNTER — HOSPITAL ENCOUNTER (OUTPATIENT)
Dept: INFUSION THERAPY | Age: 42
Discharge: HOME | End: 2022-07-26
Payer: COMMERCIAL

## 2022-07-26 VITALS
TEMPERATURE: 97.8 F | DIASTOLIC BLOOD PRESSURE: 78 MMHG | HEART RATE: 75 BPM | SYSTOLIC BLOOD PRESSURE: 123 MMHG | OXYGEN SATURATION: 97 % | RESPIRATION RATE: 19 BRPM

## 2022-07-26 DIAGNOSIS — C62.90 SEMINOMA (HCC): Primary | ICD-10-CM

## 2022-07-26 LAB
HBV CORE AB SERPL QL IA: NEGATIVE
HBV SURFACE AG SERPL QL IA: NEGATIVE

## 2022-07-26 PROCEDURE — 96366 THER/PROPH/DIAG IV INF ADDON: CPT

## 2022-07-26 PROCEDURE — 6360000002 HC RX W HCPCS: Performed by: INTERNAL MEDICINE

## 2022-07-26 PROCEDURE — 96375 TX/PRO/DX INJ NEW DRUG ADDON: CPT

## 2022-07-26 PROCEDURE — 96413 CHEMO IV INFUSION 1 HR: CPT

## 2022-07-26 PROCEDURE — 96367 TX/PROPH/DG ADDL SEQ IV INF: CPT

## 2022-07-26 PROCEDURE — 96417 CHEMO IV INFUS EACH ADDL SEQ: CPT

## 2022-07-26 PROCEDURE — 2580000003 HC RX 258: Performed by: INTERNAL MEDICINE

## 2022-07-26 RX ORDER — ONDANSETRON 2 MG/ML
8 INJECTION INTRAMUSCULAR; INTRAVENOUS ONCE
Status: COMPLETED | OUTPATIENT
Start: 2022-07-26 | End: 2022-07-26

## 2022-07-26 RX ORDER — SODIUM CHLORIDE 9 MG/ML
5-250 INJECTION, SOLUTION INTRAVENOUS PRN
Status: DISCONTINUED | OUTPATIENT
Start: 2022-07-26 | End: 2022-07-27 | Stop reason: HOSPADM

## 2022-07-26 RX ORDER — SODIUM CHLORIDE 0.9 % (FLUSH) 0.9 %
5-40 SYRINGE (ML) INJECTION PRN
Status: DISCONTINUED | OUTPATIENT
Start: 2022-07-26 | End: 2022-07-27 | Stop reason: HOSPADM

## 2022-07-26 RX ADMIN — DEXAMETHASONE SODIUM PHOSPHATE 12 MG: 4 INJECTION, SOLUTION INTRAMUSCULAR; INTRAVENOUS at 09:22

## 2022-07-26 RX ADMIN — POTASSIUM CHLORIDE: 2 INJECTION, SOLUTION, CONCENTRATE INTRAVENOUS at 12:02

## 2022-07-26 RX ADMIN — POTASSIUM CHLORIDE: 2 INJECTION, SOLUTION, CONCENTRATE INTRAVENOUS at 08:19

## 2022-07-26 RX ADMIN — ETOPOSIDE 246 MG: 20 INJECTION INTRAVENOUS at 09:40

## 2022-07-26 RX ADMIN — CISPLATIN 49 MG: 1 INJECTION, SOLUTION INTRAVENOUS at 10:52

## 2022-07-26 RX ADMIN — ONDANSETRON 8 MG: 2 INJECTION INTRAMUSCULAR; INTRAVENOUS at 09:19

## 2022-07-26 RX ADMIN — SODIUM CHLORIDE 25 ML/HR: 9 INJECTION, SOLUTION INTRAVENOUS at 09:22

## 2022-07-26 RX ADMIN — SODIUM CHLORIDE, PRESERVATIVE FREE 10 ML: 5 INJECTION INTRAVENOUS at 08:18

## 2022-07-26 NOTE — PROGRESS NOTES
Arrived to the [facility] ambulatory. C1D2 Etoposid and Cisplatin infusion completed. Patient tolerated fine. Any issues or concerns during appointment: none. Patient aware of next infusion appointment on 7/27/22 at 0800. Patient aware of next lab and [facility] office visit on 8/1/22 at 1400. Patient instructed to call provider with temperature of 100.4 or greater or nausea/vomiting/ diarrhea or pain not controlled by medications  Port remains accessed for infusion appointment tomorrow. Discharged ambulatory.

## 2022-07-27 ENCOUNTER — TELEPHONE (OUTPATIENT)
Dept: PULMONOLOGY | Age: 42
End: 2022-07-27

## 2022-07-27 ENCOUNTER — CLINICAL DOCUMENTATION (OUTPATIENT)
Dept: ONCOLOGY | Age: 42
End: 2022-07-27

## 2022-07-27 ENCOUNTER — HOSPITAL ENCOUNTER (OUTPATIENT)
Dept: INFUSION THERAPY | Age: 42
Discharge: HOME | End: 2022-07-27
Payer: COMMERCIAL

## 2022-07-27 VITALS
OXYGEN SATURATION: 96 % | SYSTOLIC BLOOD PRESSURE: 130 MMHG | TEMPERATURE: 97.9 F | HEART RATE: 68 BPM | DIASTOLIC BLOOD PRESSURE: 73 MMHG | RESPIRATION RATE: 16 BRPM

## 2022-07-27 DIAGNOSIS — C62.90 SEMINOMA (HCC): Primary | ICD-10-CM

## 2022-07-27 PROCEDURE — 96366 THER/PROPH/DIAG IV INF ADDON: CPT

## 2022-07-27 PROCEDURE — 96375 TX/PRO/DX INJ NEW DRUG ADDON: CPT

## 2022-07-27 PROCEDURE — 6360000002 HC RX W HCPCS: Performed by: INTERNAL MEDICINE

## 2022-07-27 PROCEDURE — 96413 CHEMO IV INFUSION 1 HR: CPT

## 2022-07-27 PROCEDURE — 96367 TX/PROPH/DG ADDL SEQ IV INF: CPT

## 2022-07-27 PROCEDURE — 2580000003 HC RX 258: Performed by: INTERNAL MEDICINE

## 2022-07-27 PROCEDURE — 96417 CHEMO IV INFUS EACH ADDL SEQ: CPT

## 2022-07-27 RX ORDER — SODIUM CHLORIDE 9 MG/ML
5-250 INJECTION, SOLUTION INTRAVENOUS PRN
Status: DISCONTINUED | OUTPATIENT
Start: 2022-07-27 | End: 2022-07-28 | Stop reason: HOSPADM

## 2022-07-27 RX ORDER — SODIUM CHLORIDE 0.9 % (FLUSH) 0.9 %
5-40 SYRINGE (ML) INJECTION PRN
Status: DISCONTINUED | OUTPATIENT
Start: 2022-07-27 | End: 2022-07-28 | Stop reason: HOSPADM

## 2022-07-27 RX ORDER — ONDANSETRON 2 MG/ML
8 INJECTION INTRAMUSCULAR; INTRAVENOUS ONCE
Status: COMPLETED | OUTPATIENT
Start: 2022-07-27 | End: 2022-07-27

## 2022-07-27 RX ADMIN — POTASSIUM CHLORIDE: 2 INJECTION, SOLUTION, CONCENTRATE INTRAVENOUS at 09:32

## 2022-07-27 RX ADMIN — ETOPOSIDE 246 MG: 20 INJECTION INTRAVENOUS at 10:59

## 2022-07-27 RX ADMIN — CISPLATIN 49 MG: 1 INJECTION, SOLUTION INTRAVENOUS at 12:09

## 2022-07-27 RX ADMIN — POTASSIUM CHLORIDE: 2 INJECTION, SOLUTION, CONCENTRATE INTRAVENOUS at 13:16

## 2022-07-27 RX ADMIN — SODIUM CHLORIDE, PRESERVATIVE FREE 10 ML: 5 INJECTION INTRAVENOUS at 09:32

## 2022-07-27 RX ADMIN — SODIUM CHLORIDE 25 ML/HR: 9 INJECTION, SOLUTION INTRAVENOUS at 10:34

## 2022-07-27 RX ADMIN — ONDANSETRON 8 MG: 2 INJECTION INTRAMUSCULAR; INTRAVENOUS at 10:34

## 2022-07-27 RX ADMIN — DEXAMETHASONE SODIUM PHOSPHATE 12 MG: 4 INJECTION, SOLUTION INTRAMUSCULAR; INTRAVENOUS at 10:37

## 2022-07-27 ASSESSMENT — PAIN DESCRIPTION - ONSET: ONSET: SUDDEN

## 2022-07-27 ASSESSMENT — PAIN DESCRIPTION - ORIENTATION: ORIENTATION: RIGHT

## 2022-07-27 ASSESSMENT — PAIN DESCRIPTION - PAIN TYPE: TYPE: ACUTE PAIN

## 2022-07-27 ASSESSMENT — PAIN DESCRIPTION - LOCATION: LOCATION: SHOULDER

## 2022-07-27 ASSESSMENT — PAIN SCALES - GENERAL: PAINLEVEL_OUTOF10: 3

## 2022-07-27 ASSESSMENT — PAIN - FUNCTIONAL ASSESSMENT: PAIN_FUNCTIONAL_ASSESSMENT: ACTIVITIES ARE NOT PREVENTED

## 2022-07-27 ASSESSMENT — PAIN DESCRIPTION - DESCRIPTORS: DESCRIPTORS: ACHING;SORE

## 2022-07-27 NOTE — PROGRESS NOTES
Arrived to the [facility] ambulatory. C1D3 Etoposide and Cisplatin completed. Patient tolerated well. Any issues or concerns during appointment: none. Patient aware of next infusion appointment on 7/28/22 at 0700. Patient aware of next lab and [facility] office visit on 8/1/22 at 1330. Patient instructed to call provider with temperature of 100.4 or greater or nausea/vomiting/ diarrhea or pain not controlled by medications  Discharged ambulatory.

## 2022-07-27 NOTE — TELEPHONE ENCOUNTER
At request of oncology navigator, Katya I have spoken with patient to move up CPFT. Mr Renee Lockett is accepting and will arrive tomorrow at [phone] for this testing. Notified RN navigator through e-mail.

## 2022-07-27 NOTE — PROGRESS NOTES
Clinical Social Work Note  Name: Jericho Araya    : 1980    MRN: [id number]    Date of Service: 2022    Type of Service: Health and Behavior Intervention     Length of Service: 16 minutes    Patient Diagnosis: No diagnosis found. Referral Source: Infusion RN    Reason for Visit: FU    Subject: Seen patient during infusion with his wife and mother. LISW-CP  provided therapeutic reassurance. LISW-CP introduced self, presented Support Services Groups, Therapeutic Art Classes and others. LISW-CP discussed Distress by using NCCN Guidelines for Patients' Distress. Gave patient information on Cancer and Mental Health. Also, information on meditation and relaxation as he requested. Mini Mental Status Exam: Jericho Araya was dressed properly. No abnormal psychomotor movements observed. Intellectual functioning appeared to be intact. Insight was adequate. Judgment was adequate. Patient did not report suicidal ideations, intent or plans. Speech was coherent. Thought process was clear. Patient did not report homicidal ideations, intent or plans. Patient was oriented to self, place, time and situation. Protective Factors: Current care for physical and mental illness, adequate insight and judgment, family support, cultural and [employer] beliefs and values that support self-care. Next Steps: JURGEN-CP gave contact information and encouraged pt to call should any needs arise. Pt verbalized understanding. JURGEN-CP intends to follow up as needed. No flowsheet data found.         Electronically Signed By:  JURGEN Castaneda

## 2022-07-28 ENCOUNTER — HOSPITAL ENCOUNTER (OUTPATIENT)
Dept: INFUSION THERAPY | Age: 42
Discharge: HOME | End: 2022-07-28
Payer: COMMERCIAL

## 2022-07-28 ENCOUNTER — HOSPITAL ENCOUNTER (OUTPATIENT)
Dept: PULMONOLOGY | Age: 42
Discharge: HOME | End: 2022-07-31
Payer: COMMERCIAL

## 2022-07-28 VITALS
BODY MASS INDEX: 33.14 KG/M2 | DIASTOLIC BLOOD PRESSURE: 78 MMHG | OXYGEN SATURATION: 97 % | RESPIRATION RATE: 16 BRPM | WEIGHT: 251.2 LBS | SYSTOLIC BLOOD PRESSURE: 131 MMHG | HEART RATE: 76 BPM | TEMPERATURE: 97.4 F

## 2022-07-28 DIAGNOSIS — Z79.899 NEED FOR PROPHYLACTIC CHEMOTHERAPY: ICD-10-CM

## 2022-07-28 DIAGNOSIS — C62.90 SEMINOMA (HCC): Primary | ICD-10-CM

## 2022-07-28 DIAGNOSIS — Z79.899 HIGH RISK MEDICATION USE: ICD-10-CM

## 2022-07-28 PROCEDURE — 96417 CHEMO IV INFUS EACH ADDL SEQ: CPT

## 2022-07-28 PROCEDURE — 2580000003 HC RX 258: Performed by: INTERNAL MEDICINE

## 2022-07-28 PROCEDURE — 94726 PLETHYSMOGRAPHY LUNG VOLUMES: CPT

## 2022-07-28 PROCEDURE — 96413 CHEMO IV INFUSION 1 HR: CPT

## 2022-07-28 PROCEDURE — 96367 TX/PROPH/DG ADDL SEQ IV INF: CPT

## 2022-07-28 PROCEDURE — 94010 BREATHING CAPACITY TEST: CPT

## 2022-07-28 PROCEDURE — 96375 TX/PRO/DX INJ NEW DRUG ADDON: CPT

## 2022-07-28 PROCEDURE — 94729 DIFFUSING CAPACITY: CPT

## 2022-07-28 PROCEDURE — 6360000002 HC RX W HCPCS: Performed by: INTERNAL MEDICINE

## 2022-07-28 PROCEDURE — 96366 THER/PROPH/DIAG IV INF ADDON: CPT

## 2022-07-28 RX ORDER — SODIUM CHLORIDE 0.9 % (FLUSH) 0.9 %
5-40 SYRINGE (ML) INJECTION PRN
Status: DISCONTINUED | OUTPATIENT
Start: 2022-07-28 | End: 2022-07-29 | Stop reason: HOSPADM

## 2022-07-28 RX ORDER — SODIUM CHLORIDE 9 MG/ML
5-250 INJECTION, SOLUTION INTRAVENOUS PRN
Status: DISCONTINUED | OUTPATIENT
Start: 2022-07-28 | End: 2022-07-29 | Stop reason: HOSPADM

## 2022-07-28 RX ORDER — ONDANSETRON 2 MG/ML
8 INJECTION INTRAMUSCULAR; INTRAVENOUS ONCE
Status: COMPLETED | OUTPATIENT
Start: 2022-07-28 | End: 2022-07-28

## 2022-07-28 RX ADMIN — DEXAMETHASONE SODIUM PHOSPHATE 12 MG: 4 INJECTION, SOLUTION INTRAMUSCULAR; INTRAVENOUS at 09:09

## 2022-07-28 RX ADMIN — ETOPOSIDE 246 MG: 20 INJECTION INTRAVENOUS at 09:48

## 2022-07-28 RX ADMIN — FOSAPREPITANT 150 MG: 150 INJECTION, POWDER, LYOPHILIZED, FOR SOLUTION INTRAVENOUS at 09:19

## 2022-07-28 RX ADMIN — SODIUM CHLORIDE, PRESERVATIVE FREE 10 ML: 5 INJECTION INTRAVENOUS at 08:00

## 2022-07-28 RX ADMIN — CISPLATIN 49 MG: 1 INJECTION, SOLUTION INTRAVENOUS at 10:57

## 2022-07-28 RX ADMIN — POTASSIUM CHLORIDE: 2 INJECTION, SOLUTION, CONCENTRATE INTRAVENOUS at 08:04

## 2022-07-28 RX ADMIN — ONDANSETRON 8 MG: 2 INJECTION INTRAMUSCULAR; INTRAVENOUS at 09:07

## 2022-07-28 RX ADMIN — SODIUM CHLORIDE 25 ML/HR: 9 INJECTION, SOLUTION INTRAVENOUS at 08:00

## 2022-07-28 RX ADMIN — SODIUM CHLORIDE, PRESERVATIVE FREE 10 ML: 5 INJECTION INTRAVENOUS at 13:02

## 2022-07-28 RX ADMIN — POTASSIUM CHLORIDE: 2 INJECTION, SOLUTION, CONCENTRATE INTRAVENOUS at 12:00

## 2022-07-28 NOTE — PROGRESS NOTES
Arrived to the [facility]. Cisplatin/Etoposide completed. Patient tolerated well. Any issues or concerns during appointment: none. Patient aware of next infusion appointment on 7/29 (date) at 8:00 AM (time). Patient instructed to call provider with temperature of 100.4 or greater or nausea/vomiting/ diarrhea or pain not controlled by medications  Discharged ambulatory.

## 2022-07-29 ENCOUNTER — HOSPITAL ENCOUNTER (OUTPATIENT)
Dept: INFUSION THERAPY | Age: 42
Discharge: HOME | End: 2022-07-29
Payer: COMMERCIAL

## 2022-07-29 VITALS
BODY MASS INDEX: 32.59 KG/M2 | RESPIRATION RATE: 16 BRPM | DIASTOLIC BLOOD PRESSURE: 82 MMHG | OXYGEN SATURATION: 98 % | WEIGHT: 247 LBS | SYSTOLIC BLOOD PRESSURE: 142 MMHG | HEART RATE: 78 BPM | TEMPERATURE: 97.5 F

## 2022-07-29 DIAGNOSIS — C62.90 SEMINOMA (HCC): Primary | ICD-10-CM

## 2022-07-29 PROCEDURE — 96417 CHEMO IV INFUS EACH ADDL SEQ: CPT

## 2022-07-29 PROCEDURE — 96413 CHEMO IV INFUSION 1 HR: CPT

## 2022-07-29 PROCEDURE — 2580000003 HC RX 258: Performed by: INTERNAL MEDICINE

## 2022-07-29 PROCEDURE — 6360000002 HC RX W HCPCS: Performed by: INTERNAL MEDICINE

## 2022-07-29 PROCEDURE — 96375 TX/PRO/DX INJ NEW DRUG ADDON: CPT

## 2022-07-29 PROCEDURE — 96367 TX/PROPH/DG ADDL SEQ IV INF: CPT

## 2022-07-29 PROCEDURE — 96366 THER/PROPH/DIAG IV INF ADDON: CPT

## 2022-07-29 RX ORDER — SODIUM CHLORIDE 9 MG/ML
5-250 INJECTION, SOLUTION INTRAVENOUS PRN
Status: DISCONTINUED | OUTPATIENT
Start: 2022-07-29 | End: 2022-07-30 | Stop reason: HOSPADM

## 2022-07-29 RX ORDER — SODIUM CHLORIDE 0.9 % (FLUSH) 0.9 %
5-40 SYRINGE (ML) INJECTION PRN
Status: DISCONTINUED | OUTPATIENT
Start: 2022-07-29 | End: 2022-07-30 | Stop reason: HOSPADM

## 2022-07-29 RX ORDER — ONDANSETRON 2 MG/ML
8 INJECTION INTRAMUSCULAR; INTRAVENOUS ONCE
Status: COMPLETED | OUTPATIENT
Start: 2022-07-29 | End: 2022-07-29

## 2022-07-29 RX ADMIN — ONDANSETRON 8 MG: 2 INJECTION INTRAMUSCULAR; INTRAVENOUS at 08:42

## 2022-07-29 RX ADMIN — POTASSIUM CHLORIDE: 2 INJECTION, SOLUTION, CONCENTRATE INTRAVENOUS at 09:05

## 2022-07-29 RX ADMIN — CISPLATIN 49 MG: 1 INJECTION, SOLUTION INTRAVENOUS at 11:32

## 2022-07-29 RX ADMIN — SODIUM CHLORIDE, PRESERVATIVE FREE 10 ML: 5 INJECTION INTRAVENOUS at 08:40

## 2022-07-29 RX ADMIN — SODIUM CHLORIDE 25 ML/HR: 9 INJECTION, SOLUTION INTRAVENOUS at 08:40

## 2022-07-29 RX ADMIN — SODIUM CHLORIDE, PRESERVATIVE FREE 10 ML: 5 INJECTION INTRAVENOUS at 13:38

## 2022-07-29 RX ADMIN — POTASSIUM CHLORIDE: 2 INJECTION, SOLUTION, CONCENTRATE INTRAVENOUS at 12:36

## 2022-07-29 RX ADMIN — ETOPOSIDE 246 MG: 20 INJECTION INTRAVENOUS at 10:16

## 2022-07-29 RX ADMIN — DEXAMETHASONE SODIUM PHOSPHATE 12 MG: 4 INJECTION, SOLUTION INTRAMUSCULAR; INTRAVENOUS at 08:47

## 2022-07-29 NOTE — PROGRESS NOTES
Arrived to the [facility]. Cisplatin/Etoposide completed. Patient tolerated well. Any issues or concerns during appointment: none. Patient aware of next infusion appointment on 8/1 (date) at 2:30 (time). Patient instructed to call provider with temperature of 100.4 or greater or nausea/vomiting/ diarrhea or pain not controlled by medications  Discharged ambulatory.

## 2022-08-01 ENCOUNTER — HOSPITAL ENCOUNTER (OUTPATIENT)
Dept: CT IMAGING | Age: 42
Discharge: HOME | End: 2022-08-04

## 2022-08-01 ENCOUNTER — HOSPITAL ENCOUNTER (OUTPATIENT)
Dept: INFUSION THERAPY | Age: 42
Discharge: HOME | End: 2022-08-01

## 2022-08-01 ENCOUNTER — OFFICE VISIT (OUTPATIENT)
Dept: ONCOLOGY | Age: 42
End: 2022-08-01
Payer: COMMERCIAL

## 2022-08-01 ENCOUNTER — HOSPITAL ENCOUNTER (OUTPATIENT)
Dept: INFUSION THERAPY | Age: 42
Discharge: HOME | End: 2022-08-01
Payer: COMMERCIAL

## 2022-08-01 ENCOUNTER — CLINICAL DOCUMENTATION (OUTPATIENT)
Dept: ONCOLOGY | Age: 42
End: 2022-08-01

## 2022-08-01 ENCOUNTER — CLINICAL DOCUMENTATION (OUTPATIENT)
Dept: CASE MANAGEMENT | Age: 42
End: 2022-08-01

## 2022-08-01 VITALS
BODY MASS INDEX: 31.51 KG/M2 | TEMPERATURE: 97.5 F | DIASTOLIC BLOOD PRESSURE: 77 MMHG | WEIGHT: 237.8 LBS | OXYGEN SATURATION: 97 % | SYSTOLIC BLOOD PRESSURE: 118 MMHG | HEIGHT: 73 IN | HEART RATE: 124 BPM | RESPIRATION RATE: 13 BRPM

## 2022-08-01 DIAGNOSIS — R06.02 SHORTNESS OF BREATH: ICD-10-CM

## 2022-08-01 DIAGNOSIS — C62.90 SEMINOMA (HCC): ICD-10-CM

## 2022-08-01 DIAGNOSIS — R63.4 WEIGHT LOSS: ICD-10-CM

## 2022-08-01 DIAGNOSIS — Z79.899 HIGH RISK MEDICATION USE: ICD-10-CM

## 2022-08-01 DIAGNOSIS — C62.90 SEMINOMA (HCC): Primary | ICD-10-CM

## 2022-08-01 LAB
ALBUMIN SERPL-MCNC: 3.2 G/DL (ref 3.5–5)
ALBUMIN/GLOB SERPL: 0.8 {RATIO} (ref 1.2–3.5)
ALP SERPL-CCNC: 128 U/L (ref 50–136)
ALT SERPL-CCNC: 34 U/L (ref 12–65)
ANION GAP SERPL CALC-SCNC: 8 MMOL/L (ref 7–16)
AST SERPL-CCNC: 15 U/L (ref 15–37)
BASOPHILS # BLD: 0 K/UL (ref 0–0.2)
BASOPHILS NFR BLD: 0 % (ref 0–2)
BILIRUB SERPL-MCNC: 0.6 MG/DL (ref 0.2–1.1)
BUN SERPL-MCNC: 23 MG/DL (ref 6–23)
CALCIUM SERPL-MCNC: 8.5 MG/DL (ref 8.3–10.4)
CHLORIDE SERPL-SCNC: 104 MMOL/L (ref 98–107)
CO2 SERPL-SCNC: 24 MMOL/L (ref 21–32)
CREAT SERPL-MCNC: 1 MG/DL (ref 0.8–1.5)
DIFFERENTIAL METHOD BLD: ABNORMAL
EOSINOPHIL # BLD: 0.1 K/UL (ref 0–0.8)
EOSINOPHIL NFR BLD: 1 % (ref 0.5–7.8)
ERYTHROCYTE [DISTWIDTH] IN BLOOD BY AUTOMATED COUNT: 12.2 % (ref 11.9–14.6)
GLOBULIN SER CALC-MCNC: 4 G/DL (ref 2.3–3.5)
GLUCOSE SERPL-MCNC: 154 MG/DL (ref 65–100)
HCG SERPL-ACNC: 82 MIU/ML (ref 0–2)
HCT VFR BLD AUTO: 47.6 %
HGB BLD-MCNC: 16.5 G/DL (ref 13.6–17.2)
IMM GRANULOCYTES # BLD AUTO: 0.1 K/UL (ref 0–0.5)
IMM GRANULOCYTES NFR BLD AUTO: 1 % (ref 0–5)
LYMPHOCYTES # BLD: 0.8 K/UL (ref 0.5–4.6)
LYMPHOCYTES NFR BLD: 9 % (ref 13–44)
MCH RBC QN AUTO: 29.3 PG (ref 26.1–32.9)
MCHC RBC AUTO-ENTMCNC: 34.7 G/DL (ref 31.4–35)
MCV RBC AUTO: 84.5 FL (ref 79.6–97.8)
MONOCYTES # BLD: 0 K/UL (ref 0.1–1.3)
MONOCYTES NFR BLD: 0 % (ref 4–12)
NEUTS SEG # BLD: 8.4 K/UL (ref 1.7–8.2)
NEUTS SEG NFR BLD: 89 % (ref 43–78)
NRBC # BLD: 0 K/UL (ref 0–0.2)
PLATELET # BLD AUTO: 251 K/UL (ref 150–450)
PLATELET COMMENT: ADEQUATE
PMV BLD AUTO: 8.9 FL (ref 9.4–12.3)
POTASSIUM SERPL-SCNC: 3.7 MMOL/L (ref 3.5–5.1)
PROT SERPL-MCNC: 7.2 G/DL (ref 6.3–8.2)
RBC # BLD AUTO: 5.63 M/UL (ref 4.23–5.6)
RBC MORPH BLD: ABNORMAL
SODIUM SERPL-SCNC: 136 MMOL/L (ref 136–145)
WBC # BLD AUTO: 9.4 K/UL (ref 4.3–11.1)
WBC MORPH BLD: ABNORMAL

## 2022-08-01 PROCEDURE — 80053 COMPREHEN METABOLIC PANEL: CPT

## 2022-08-01 PROCEDURE — 85025 COMPLETE CBC W/AUTO DIFF WBC: CPT

## 2022-08-01 PROCEDURE — 2580000003 HC RX 258: Performed by: INTERNAL MEDICINE

## 2022-08-01 PROCEDURE — 36591 DRAW BLOOD OFF VENOUS DEVICE: CPT

## 2022-08-01 PROCEDURE — 84702 CHORIONIC GONADOTROPIN TEST: CPT

## 2022-08-01 PROCEDURE — 99215 OFFICE O/P EST HI 40 MIN: CPT | Performed by: INTERNAL MEDICINE

## 2022-08-01 RX ORDER — SODIUM CHLORIDE 0.9 % (FLUSH) 0.9 %
10 SYRINGE (ML) INJECTION PRN
Status: DISCONTINUED | OUTPATIENT
Start: 2022-08-01 | End: 2022-08-02 | Stop reason: HOSPADM

## 2022-08-01 RX ADMIN — Medication 10 ML: at 13:22

## 2022-08-01 ASSESSMENT — PATIENT HEALTH QUESTIONNAIRE - PHQ9
2. FEELING DOWN, DEPRESSED OR HOPELESS: 0
SUM OF ALL RESPONSES TO PHQ QUESTIONS 1-9: 0
1. LITTLE INTEREST OR PLEASURE IN DOING THINGS: 0
SUM OF ALL RESPONSES TO PHQ9 QUESTIONS 1 & 2: 0
SUM OF ALL RESPONSES TO PHQ QUESTIONS 1-9: 0

## 2022-08-01 NOTE — PROGRESS NOTES
[facility]: Office Visit Progress Note    Chief Complaint:    Testicular cancer    History of Present Illness:  38 y.o. male past medical history of hypertension, hyperlipidemia, diabetes, presented to the emergency room with testicle swelling and lower back and abdominal pain. Reported low back pain intermittently over the past month, did not have work-up yet. Now he noticed testicular swelling with pain and presented to the emergency room for further evaluation. Scrotal ultrasound showed large left side testicular mass highly concerning for testicular malignancy measuring 4.4 x 3.7 x 3.9 cm. CT showed massive retroperitoneal lymph nodes conglomerates and a largest retrocrural lymph nodes suspicious for lymphoma versus metastatic disease. There is severe luminal narrowing of the IVC given the mass-effect upon the IVC and mass-effect upon the abdominal aorta as well as the iliopsoas musculature. Oncology is consulted for recommendation. 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 Review of Systems:  Constitutional Anorexia, fatigue. Weight loss. Denies fever or chills. HEENT Denies trauma, bluring vision, hearing loss, ear pain, nosebleeds, sore throat, neck pain and ear discharge. Skin Denies lesions or rashes. Lungs Denies shortness of breath, cough, sputum production or hemoptysis. Cardiovascular Denies, palpitations, orthopnea, claudication and leg swelling. Gastrointestinal Denies nausea, vomiting, bowel changes. Denies bloody or black stools. Denies abdominal pain.  Status post left orchiectomy. Denies dysuria, frequency or hesitancy of urination   Neuro Denies headaches, visual changes or ataxia. Denies dizziness, tingling, tremors, sensory change, speech change, focal weakness and headaches.      Hematology Denies nasal/gum bleeding, denies easy bruise   Endo Denies heat/cold intolerance, denies diabetes. MSK Back pain. Denies swollen legs, myalgias and falls. Psychiatric/Behavioral Denies depression and substance abuse. The patient is not nervous/anxious. No Known Allergies  Past Medical History:   Diagnosis Date    Benign essential hypertension     controlled with med    Diabetes mellitus type 2, noninsulin dependent ([address]     oral reliant; AVG -105; pt does have s.s of hypoglycemia but does not know what BS is when it gets low; last A1C    Fatty liver     per pt    Hyperlipidemia     managed with med    Testicular mass     Left     Past Surgical History:   Procedure Laterality Date    APPENDECTOMY      IR PORT PLACEMENT EQUAL OR GREATER THAN 5 YEARS  7/22/2022    IR PORT PLACEMENT EQUAL OR GREATER THAN 5 YEARS 7/22/2022 [facility]    KIDNEY STONE SURGERY Right 2011    TESTICLE REMOVAL Left 7/7/2022    LEFT RADICAL ORCHIECTOMY Merleen Dies APPROACH performed by Nelly Castillo MD at [facility] MAIN OR     No family history on file.   Social History     Socioeconomic History    Marital status:      Spouse name: Not on file    Number of children: Not on file    Years of education: Not on file    Highest education level: Not on file   Occupational History    Not on file   Tobacco Use    Smoking status: Never    Smokeless tobacco: Never   Vaping Use    Vaping Use: Never used   Substance and Sexual Activity    Alcohol use: Yes     Comment: one or two per week    Drug use: Never    Sexual activity: Not on file   Other Topics Concern    Not on file   Social History Narrative    Not on file     Social Determinants of Health     Financial Resource Strain: Not on file   Food Insecurity: Not on file   Transportation Needs: Not on file   Physical Activity: Not on file   Stress: Not on file   Social Connections: Not on file   Intimate Partner Violence: Not on file   Housing Stability: Not on file     Current Outpatient Medications   Medication Sig Dispense Refill    HYDROcodone-acetaminophen (NORCO) 7.5-325 MG per tablet PLEASE SEE ATTACHED FOR DETAILED DIRECTIONS      lidocaine-prilocaine (EMLA) 2.5-2.5 % cream Apply topically as needed. 1 each 2    ondansetron (ZOFRAN-ODT) 8 MG TBDP disintegrating tablet Place 1 tablet under the tongue every 8 hours as needed for Nausea or Vomiting 90 tablet 2    prochlorperazine (COMPAZINE) 10 MG tablet Take 1 tablet by mouth every 6 hours as needed (cinv) 90 tablet 2    sennosides-docusate sodium (SENOKOT-S) 8.6-50 MG tablet Take 1 tablet by mouth daily as needed for Constipation 30 tablet 0    telmisartan-amLODIPine (TWYNSTA) 80-5 MG TABS tablet Take 1 tablet by mouth daily      atenolol (TENORMIN) 50 MG tablet Take 50 mg by mouth daily      atorvastatin (LIPITOR) 20 MG tablet Take 20 mg by mouth nightly      metFORMIN (GLUCOPHAGE-XR) 500 MG extended release tablet Take 2 with breakfast and supper - Diabetes      spironolactone (ALDACTONE) 50 MG tablet Take 50 mg by mouth daily       No current facility-administered medications for this visit. Facility-Administered Medications Ordered in Other Visits   Medication Dose Route Frequency Provider Last Rate Last Admin    sodium chloride flush 0.9 % injection 10 mL  10 mL IntraVENous PRN Kevin Lee MD   10 mL at 08/01/22 1322       OBJECTIVE:  /77 (Site: Right Upper Arm, Position: Standing, Cuff Size: Medium Adult)   Pulse (!) 124   Temp 97.5 °F (36.4 °C) (Oral)   Resp 13   Ht 6' 1\" (1.854 m)   Wt 237 lb 12.8 oz (107.9 kg)   SpO2 97%   BMI 31.37 kg/m²     Physical Exam:  Constitutional: Oriented to person, place, and time. Well-developed and well-nourished. HEENT: Normocephalic and atraumatic. Oropharynx is clear and moist.   Conjunctivae and EOM are normal. Pupils are equal, round, and reactive to light. No scleral icterus. Neck supple. No JVD present. No tracheal deviation present. No thyromegaly present.     Lymph node No palpable submandibular, 08/01/22  1:14 PM   Result Value Ref Range    Sodium 136 136 - 145 mmol/L    Potassium 3.7 3.5 - 5.1 mmol/L    Chloride 104 98 - 107 mmol/L    CO2 24 21 - 32 mmol/L    Anion Gap 8 7 - 16 mmol/L    Glucose 154 (H) 65 - 100 mg/dL    BUN 23 6 - 23 MG/DL    Creatinine 1.00 0.8 - 1.5 MG/DL    GFR African American >60 >60 ml/min/1.73m2    GFR Non- >60 >60 ml/min/1.73m2    Calcium 8.5 8.3 - 10.4 MG/DL    Total Bilirubin 0.6 0.2 - 1.1 MG/DL    ALT 34 12 - 65 U/L    AST 15 15 - 37 U/L    Alk Phosphatase 128 50 - 136 U/L    Total Protein 7.2 6.3 - 8.2 g/dL    Albumin 3.2 (L) 3.5 - 5.0 g/dL    Globulin 4.0 (H) 2.3 - 3.5 g/dL    Albumin/Globulin Ratio 0.8 (L) 1.2 - 3.5     HCG, Quantitative, Pregnancy    Collection Time: 08/01/22  1:14 PM   Result Value Ref Range    hCG Quant 82 (H) 0.0 - 2.0 MIU/ML       Imaging:  No results found for this or any previous visit. ASSESSMENT/PLAN:   Diagnosis Orders   1. Seminoma ([address]  CT CHEST PULMONARY EMBOLISM W CONTRAST    CBC with Auto Differential    Comprehensive Metabolic Panel      2. Shortness of breath  CT CHEST PULMONARY EMBOLISM W CONTRAST      3. High risk medication use        4.  Weight loss            38 y.o. 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, he need tumor markers for further diagnosis, patient and wife are tearful and discussed that this was a potentially rather curable cancer, typically would pursue inguinal radical orchiectomy for diagnosis and local disease control, nonetheless he is a typical case radiographically if beta-hCG is highly elevated, and the severe narrowing of IVC is of clinical concern of complication unless chemotherapy is started soon, discussed with uroncology and may start chemo urgently if needed, discussed options with patient and he agrees with starting chemotherapy as soon as possible to avoid further complication, elevated beta-hCG and normal AFP consistent with seminoma, completed sperm banking and received a left radical inguinal orchiectomy on 7/7/2022 and the pathology showed pure seminoma, followed in office on 7/19/2022 and we discussed the result, beta-hCG and LDH increasing regularly despite of the resection, increased back pain and night sweat, placed port and start BEP x3 7/25/2022, return on 8/1/2022, hCG down to 82, however not feeling well and lost 14 pounds in the week, not short of breath but reports some atypical chest pain that lasts for seconds each time, tachycardic and blood pressure lower than baseline, stat CT chest to rule out PE, arrange IV fluid for dehydration, technically low clear contraindication to proceed to day 8 bleomycin but he can choose to defer for better recovery, follow next week, call as needed. Baseline PFT WNL. All questions are answered to their satisfaction. They will call for further questions and concerns. ECOG PERFORMANCE STATUS - 0-Fully active, able to carry on all pre-disease performance without restriction. Pain - 0 - No pain/10. Mild to moderate pain, requiring medication - see MAR     Fatigue - No flowsheet data found. Distress - No flowsheet data found. Total time independently spent on today's visit was 40min.  This time included: face-to-face time evaluating the patient as well as additional non-face-to-face time spent on: Preparing to see the patient by obtaining and reviewing previous test results, records and medical history, Performing a medically appropriate history and exam and documenting relevant clinical information for this visit, Counseling and educating patient and family, Ordering medications, Communicating with other health care professionals and Referring patient to another health care provider. Elements of this note have been dictated via voice recognition software. Text and phrases may be limited by the accuracy and autoconversion of the software. The chart has been reviewed, but errors may still be present. Corby Zhu M.D.   [address]  Office : [phone]  Fax : [phone]

## 2022-08-01 NOTE — PROGRESS NOTES
Patient arrived to port lab for port access and lab draw   Miko Sterling 45 accessed and labs drawn per protocol   *Port remains accessed   Patient discharged from port lab ambulatory*

## 2022-08-01 NOTE — PROGRESS NOTES
Call from radiology. Patient is negative for a PE.  No acute findings within the chest. Notified navigator Luann of results

## 2022-08-01 NOTE — PROGRESS NOTES
8/1/22 saw pt today with Dr. Jefferson Chapman for pre chemo c1d8 BEP. He is reporting some chest discomfort at times, onset mid last week. It is very intermittent and is not always with exertion. PO intake is fair due to decreased appetite. Will delay chemo today. Arrange stat CT chest PE protocol. Stressed importance of food/calorie and fluid intake. Will tentatively reschedule chemo for tomorrow. Navigation will continue to follow. 4:35 pm - CT chest negative for PE. Dr. Jefferson Chapman stated pt could either proceed with chemo tomorrow or delay 1 week. I spoke with pt and he would like to proceed tomorrow with chemo. Discussed importance of PO intake and he verbalized understanding.

## 2022-08-01 NOTE — PATIENT INSTRUCTIONS
Patient Instructions from Today's Visit    Reason for Visit:  Pre chemo cycle 1 day 8 BEP    Plan:  Infusion on hold waiting for CT   You need to focus on food/calorie and fluid intake. You are loosing weight. CT chest PE protocol    Follow Up:  1 week    Recent Lab Results:  Hospital Outpatient Visit on 08/01/2022   Component Date Value Ref Range Status    WBC 08/01/2022 9.4  4.3 - 11.1 K/uL Final    Comment: RESULTS CHECKED X 2  PERIPHERAL REVIEW TO FOLLOW      RBC 08/01/2022 5.63 (A) 4.23 - 5.6 M/uL Final    Hemoglobin 08/01/2022 16.5  13.6 - 17.2 g/dL Final    Hematocrit 08/01/2022 47.6  % Final    MCV 08/01/2022 84.5  79.6 - 97.8 FL Final    MCH 08/01/2022 29.3  26.1 - 32.9 PG Final    MCHC 08/01/2022 34.7  31.4 - 35.0 g/dL Final    RDW 08/01/2022 12.2  11.9 - 14.6 % Final    Platelets 63/86/7789 251  150 - 450 K/uL Final    MPV 08/01/2022 8.9 (A) 9.4 - 12.3 FL Final    nRBC 08/01/2022 0.00  0.0 - 0.2 K/uL Final    **Note: Absolute NRBC parameter is now reported with Hemogram**    Differential Type 08/01/2022 PENDING   Incomplete    Sodium 08/01/2022 136  136 - 145 mmol/L Final    Potassium 08/01/2022 3.7  3.5 - 5.1 mmol/L Final    Chloride 08/01/2022 104  98 - 107 mmol/L Final    CO2 08/01/2022 24  21 - 32 mmol/L Final    Anion Gap 08/01/2022 8  7 - 16 mmol/L Final    Glucose 08/01/2022 154 (A) 65 - 100 mg/dL Final    BUN 08/01/2022 23  6 - 23 MG/DL Final    Creatinine 08/01/2022 1.00  0.8 - 1.5 MG/DL Final    GFR  08/01/2022 >60  >60 ml/min/1.73m2 Final    GFR Non- 08/01/2022 >60  >60 ml/min/1.73m2 Final    Comment:      Estimated GFR is calculated using the Modification of Diet in Renal Disease (MDRD) Study equation, reported for both  Americans (GFRAA) and non- Americans (GFRNA), and normalized to 1.73m2 body surface area. The physician must decide which value applies to the patient.   The MDRD study equation should only be used in individuals age 25 or older. It has not been validated for the following: pregnant women, patients with serious comorbid conditions,or on certain medications, or persons with extremes of body size, muscle mass, or nutritional status. Calcium 08/01/2022 8.5  8.3 - 10.4 MG/DL Final    Total Bilirubin 08/01/2022 0.6  0.2 - 1.1 MG/DL Final    ALT 08/01/2022 34  12 - 65 U/L Final    AST 08/01/2022 15  15 - 37 U/L Final    Alk Phosphatase 08/01/2022 128  50 - 136 U/L Final    Total Protein 08/01/2022 7.2  6.3 - 8.2 g/dL Final    Albumin 08/01/2022 3.2 (A) 3.5 - 5.0 g/dL Final    Globulin 08/01/2022 4.0 (A) 2.3 - 3.5 g/dL Final    Albumin/Globulin Ratio 08/01/2022 0.8 (A) 1.2 - 3.5   Final    hCG Quant 08/01/2022 82 (A) 0.0 - 2.0 MIU/ML Final    Comment: Gestational Age 0.2-1 Week 5-50        mIU/mL  Gestational Age 1-2 Weeks        mIU/mL  Gestational Age 2-3 Weeks  100-5000     mIU/mL  Gestational Age 3-4 Weeks  500-04733    mIU/mL  Gestational Age 4-5 Weeks  1000-08968   mIU/mL  Gestational Age 5-6 Weeks  80983-848166 mIU/ml  Gestational Age 6-8 Weeks  49092-814405 mIU/ml  Gestational Age 2-3 Months 80578-380270 mIU/ml  If this is for miscarriage diagnosis it is recommended that repeat testing be done at this facility. Treatment Summary has been discussed and given to patient: no        -------------------------------------------------------------------------------------------------------------------  Please call our office at [phone] if you have any  of the following symptoms:   Fever of 100.5 or greater  Chills  Shortness of breath  Swelling or pain in one leg    After office hours an answering service is available and will contact a provider for emergencies or if you are experiencing any of the above symptoms. Patient did express an interest in My Chart. My Chart log in information explained on the after visit summary printout at the [facility] [address] desk.     Eliel Matthew RN, BSN  Nurse Navigator  [phone] cell  [email or web address]

## 2022-08-02 ENCOUNTER — HOSPITAL ENCOUNTER (OUTPATIENT)
Dept: INFUSION THERAPY | Age: 42
Discharge: HOME | End: 2022-08-02
Payer: COMMERCIAL

## 2022-08-02 VITALS
WEIGHT: 240.4 LBS | SYSTOLIC BLOOD PRESSURE: 107 MMHG | OXYGEN SATURATION: 98 % | HEART RATE: 83 BPM | BODY MASS INDEX: 31.72 KG/M2 | TEMPERATURE: 98.7 F | DIASTOLIC BLOOD PRESSURE: 75 MMHG

## 2022-08-02 DIAGNOSIS — C62.90 SEMINOMA (HCC): Primary | ICD-10-CM

## 2022-08-02 PROCEDURE — 6370000000 HC RX 637 (ALT 250 FOR IP): Performed by: INTERNAL MEDICINE

## 2022-08-02 PROCEDURE — 2580000003 HC RX 258: Performed by: INTERNAL MEDICINE

## 2022-08-02 PROCEDURE — 6360000002 HC RX W HCPCS: Performed by: INTERNAL MEDICINE

## 2022-08-02 PROCEDURE — 96409 CHEMO IV PUSH SNGL DRUG: CPT

## 2022-08-02 PROCEDURE — 96375 TX/PRO/DX INJ NEW DRUG ADDON: CPT

## 2022-08-02 RX ORDER — SODIUM CHLORIDE 9 MG/ML
5-250 INJECTION, SOLUTION INTRAVENOUS PRN
Status: DISCONTINUED | OUTPATIENT
Start: 2022-08-02 | End: 2022-08-03 | Stop reason: HOSPADM

## 2022-08-02 RX ORDER — SODIUM CHLORIDE 9 MG/ML
INJECTION, SOLUTION INTRAVENOUS ONCE
Status: COMPLETED | OUTPATIENT
Start: 2022-08-02 | End: 2022-08-02

## 2022-08-02 RX ORDER — ACETAMINOPHEN 325 MG/1
650 TABLET ORAL ONCE
Status: COMPLETED | OUTPATIENT
Start: 2022-08-02 | End: 2022-08-02

## 2022-08-02 RX ORDER — SODIUM CHLORIDE 9 MG/ML
INJECTION, SOLUTION INTRAVENOUS ONCE
Status: CANCELLED
Start: 2022-08-02 | End: 2022-08-02

## 2022-08-02 RX ORDER — SODIUM CHLORIDE 0.9 % (FLUSH) 0.9 %
5-40 SYRINGE (ML) INJECTION PRN
Status: DISCONTINUED | OUTPATIENT
Start: 2022-08-02 | End: 2022-08-03 | Stop reason: HOSPADM

## 2022-08-02 RX ORDER — DIPHENHYDRAMINE HYDROCHLORIDE 50 MG/ML
25 INJECTION INTRAMUSCULAR; INTRAVENOUS ONCE
Status: COMPLETED | OUTPATIENT
Start: 2022-08-02 | End: 2022-08-02

## 2022-08-02 RX ADMIN — SODIUM CHLORIDE: 9 INJECTION, SOLUTION INTRAVENOUS at 08:36

## 2022-08-02 RX ADMIN — DIPHENHYDRAMINE HYDROCHLORIDE 25 MG: 50 INJECTION, SOLUTION INTRAMUSCULAR; INTRAVENOUS at 09:01

## 2022-08-02 RX ADMIN — SODIUM CHLORIDE, PRESERVATIVE FREE 10 ML: 5 INJECTION INTRAVENOUS at 08:35

## 2022-08-02 RX ADMIN — ACETAMINOPHEN 650 MG: 325 TABLET ORAL at 09:01

## 2022-08-02 RX ADMIN — BLEOMYCIN SULFATE 30 UNITS: 30 INJECTION, POWDER, LYOPHILIZED, FOR SOLUTION INTRAMUSCULAR; INTRAPLEURAL; INTRAVENOUS; SUBCUTANEOUS at 09:46

## 2022-08-02 NOTE — PROGRESS NOTES
Arrived to the [facility] ambulatory. C1D8 Bleocin infusion completed. Patient tolerated well. Any issues or concerns during appointment: none. Patient aware of next infusion appointment on 8/8/22 at 1130. Patient aware of next lab and [facility] office visit on 8/8/22 at 0930. Patient instructed to call provider with temperature of 100.4 or greater or nausea/vomiting/ diarrhea or pain not controlled by medications  Discharged ambulatory.

## 2022-08-08 ENCOUNTER — HOSPITAL ENCOUNTER (OUTPATIENT)
Dept: INFUSION THERAPY | Age: 42
Discharge: HOME | End: 2022-08-08

## 2022-08-08 ENCOUNTER — CLINICAL DOCUMENTATION (OUTPATIENT)
Dept: ONCOLOGY | Age: 42
End: 2022-08-08

## 2022-08-08 ENCOUNTER — CLINICAL DOCUMENTATION (OUTPATIENT)
Dept: CASE MANAGEMENT | Age: 42
End: 2022-08-08

## 2022-08-08 ENCOUNTER — OFFICE VISIT (OUTPATIENT)
Dept: ONCOLOGY | Age: 42
End: 2022-08-08
Payer: COMMERCIAL

## 2022-08-08 ENCOUNTER — HOSPITAL ENCOUNTER (OUTPATIENT)
Dept: INFUSION THERAPY | Age: 42
Discharge: HOME | End: 2022-08-08
Payer: COMMERCIAL

## 2022-08-08 VITALS
HEART RATE: 95 BPM | WEIGHT: 237.8 LBS | SYSTOLIC BLOOD PRESSURE: 128 MMHG | TEMPERATURE: 98.6 F | DIASTOLIC BLOOD PRESSURE: 87 MMHG | OXYGEN SATURATION: 97 % | RESPIRATION RATE: 14 BRPM | HEIGHT: 73 IN | BODY MASS INDEX: 31.51 KG/M2

## 2022-08-08 DIAGNOSIS — D70.1 CHEMOTHERAPY-INDUCED NEUTROPENIA (HCC): ICD-10-CM

## 2022-08-08 DIAGNOSIS — R52 PAIN: ICD-10-CM

## 2022-08-08 DIAGNOSIS — Z79.899 HIGH RISK MEDICATION USE: ICD-10-CM

## 2022-08-08 DIAGNOSIS — T45.1X5A CHEMOTHERAPY-INDUCED NEUTROPENIA (HCC): ICD-10-CM

## 2022-08-08 DIAGNOSIS — K12.30 MUCOSITIS: ICD-10-CM

## 2022-08-08 DIAGNOSIS — C62.90 SEMINOMA (HCC): ICD-10-CM

## 2022-08-08 DIAGNOSIS — C62.90 SEMINOMA (HCC): Primary | ICD-10-CM

## 2022-08-08 LAB
ALBUMIN SERPL-MCNC: 3.3 G/DL (ref 3.5–5)
ALBUMIN/GLOB SERPL: 0.8 {RATIO} (ref 1.2–3.5)
ALP SERPL-CCNC: 107 U/L (ref 50–136)
ALT SERPL-CCNC: 26 U/L (ref 12–65)
ANION GAP SERPL CALC-SCNC: 8 MMOL/L (ref 7–16)
AST SERPL-CCNC: 8 U/L (ref 15–37)
BASOPHILS # BLD: 0 K/UL (ref 0–0.2)
BASOPHILS NFR BLD: 1 % (ref 0–2)
BILIRUB SERPL-MCNC: 0.6 MG/DL (ref 0.2–1.1)
BUN SERPL-MCNC: 9 MG/DL (ref 6–23)
CALCIUM SERPL-MCNC: 8.9 MG/DL (ref 8.3–10.4)
CHLORIDE SERPL-SCNC: 106 MMOL/L (ref 98–107)
CO2 SERPL-SCNC: 25 MMOL/L (ref 21–32)
CREAT SERPL-MCNC: 1.1 MG/DL (ref 0.8–1.5)
DIFFERENTIAL METHOD BLD: ABNORMAL
EOSINOPHIL # BLD: 0 K/UL (ref 0–0.8)
EOSINOPHIL NFR BLD: 5 % (ref 0.5–7.8)
ERYTHROCYTE [DISTWIDTH] IN BLOOD BY AUTOMATED COUNT: 11.9 % (ref 11.9–14.6)
GLOBULIN SER CALC-MCNC: 4.3 G/DL (ref 2.3–3.5)
GLUCOSE SERPL-MCNC: 156 MG/DL (ref 65–100)
HCT VFR BLD AUTO: 40.6 %
HGB BLD-MCNC: 14 G/DL (ref 13.6–17.2)
IMM GRANULOCYTES # BLD AUTO: 0 K/UL (ref 0–0.5)
IMM GRANULOCYTES NFR BLD AUTO: 1 % (ref 0–5)
LYMPHOCYTES # BLD: 0.5 K/UL (ref 0.5–4.6)
LYMPHOCYTES NFR BLD: 62 % (ref 13–44)
MCH RBC QN AUTO: 29.1 PG (ref 26.1–32.9)
MCHC RBC AUTO-ENTMCNC: 34.5 G/DL (ref 31.4–35)
MCV RBC AUTO: 84.4 FL (ref 79.6–97.8)
MONOCYTES # BLD: 0.2 K/UL (ref 0.1–1.3)
MONOCYTES NFR BLD: 24 % (ref 4–12)
NEUTS SEG # BLD: 0.1 K/UL (ref 1.7–8.2)
NEUTS SEG NFR BLD: 7 % (ref 43–78)
NRBC # BLD: 0 K/UL (ref 0–0.2)
PLATELET # BLD AUTO: 130 K/UL (ref 150–450)
PMV BLD AUTO: 8.7 FL (ref 9.4–12.3)
POTASSIUM SERPL-SCNC: 3.7 MMOL/L (ref 3.5–5.1)
PROT SERPL-MCNC: 7.6 G/DL (ref 6.3–8.2)
RBC # BLD AUTO: 4.81 M/UL (ref 4.23–5.6)
SODIUM SERPL-SCNC: 139 MMOL/L (ref 136–145)
WBC # BLD AUTO: 0.9 K/UL (ref 4.3–11.1)

## 2022-08-08 PROCEDURE — 36591 DRAW BLOOD OFF VENOUS DEVICE: CPT

## 2022-08-08 PROCEDURE — 2580000003 HC RX 258: Performed by: INTERNAL MEDICINE

## 2022-08-08 PROCEDURE — 80053 COMPREHEN METABOLIC PANEL: CPT

## 2022-08-08 PROCEDURE — 99215 OFFICE O/P EST HI 40 MIN: CPT | Performed by: INTERNAL MEDICINE

## 2022-08-08 PROCEDURE — 85025 COMPLETE CBC W/AUTO DIFF WBC: CPT

## 2022-08-08 RX ORDER — SODIUM CHLORIDE 0.9 % (FLUSH) 0.9 %
10 SYRINGE (ML) INJECTION PRN
Status: DISCONTINUED | OUTPATIENT
Start: 2022-08-08 | End: 2022-08-09 | Stop reason: HOSPADM

## 2022-08-08 RX ORDER — CHLORHEXIDINE GLUCONATE 0.12 MG/ML
15 RINSE ORAL 2 TIMES DAILY
Qty: 420 ML | Refills: 0 | Status: SHIPPED | OUTPATIENT
Start: 2022-08-08 | End: 2022-08-22

## 2022-08-08 RX ADMIN — Medication 10 ML: at 09:58

## 2022-08-08 ASSESSMENT — PATIENT HEALTH QUESTIONNAIRE - PHQ9
2. FEELING DOWN, DEPRESSED OR HOPELESS: 0
SUM OF ALL RESPONSES TO PHQ QUESTIONS 1-9: 0

## 2022-08-08 NOTE — PROGRESS NOTES
8/8/22 saw pt today with Dr. Jefferson Chapman for pre chemo cycle 1 day 15 BEP. Will delay 1 week due to low white count. PO intake is improving. He is reporting mouth tenderness - peridex sent to pharmacy and pt instructed on mouth care. Rash on trunk will monitor. Port needle removed. Follow up in 1 week. Encouraged to call with any concerns. Navigation will continue to follow.

## 2022-08-08 NOTE — PATIENT INSTRUCTIONS
Patient Instructions from Today's Visit    Reason for Visit:  Pre chemo cycle 1 day 15 BEP    Plan:  Proceed to infusion   See below for mouth care  Will send rx to [facility] pharmacy for mouth care   Will monitor rash on trunk     Follow Up:  1 week    Recent Lab Results:  Hospital Outpatient Visit on 08/08/2022   Component Date Value Ref Range Status    WBC 08/08/2022 0.9 (A) 4.3 - 11.1 K/uL Final    Comment: RESULTS CHECKED X 2  RESULTS VERIFIED, PHONED TO AND READ BACK BY  Amy Feliz RN AT 1017 8/8/22 [facility]      RBC 08/08/2022 4.81  4.23 - 5.6 M/uL Final    Hemoglobin 08/08/2022 14.0  13.6 - 17.2 g/dL Final    Hematocrit 08/08/2022 40.6  % Final    MCV 08/08/2022 84.4  79.6 - 97.8 FL Final    MCH 08/08/2022 29.1  26.1 - 32.9 PG Final    MCHC 08/08/2022 34.5  31.4 - 35.0 g/dL Final    RDW 08/08/2022 11.9  11.9 - 14.6 % Final    Platelets 39/77/6415 130 (A) 150 - 450 K/uL Final    MPV 08/08/2022 8.7 (A) 9.4 - 12.3 FL Final    nRBC 08/08/2022 0.00  0.0 - 0.2 K/uL Final    **Note: Absolute NRBC parameter is now reported with Hemogram**    Seg Neutrophils 08/08/2022 7 (A) 43 - 78 % Final    Lymphocytes 08/08/2022 62 (A) 13 - 44 % Final    Monocytes 08/08/2022 24 (A) 4.0 - 12.0 % Final    Eosinophils % 08/08/2022 5  0.5 - 7.8 % Final    Basophils 08/08/2022 1  0.0 - 2.0 % Final    Immature Granulocytes 08/08/2022 1  0.0 - 5.0 % Final    Segs Absolute 08/08/2022 0.1 (A) 1.7 - 8.2 K/UL Final    Absolute Lymph # 08/08/2022 0.5  0.5 - 4.6 K/UL Final    Absolute Mono # 08/08/2022 0.2  0.1 - 1.3 K/UL Final    Absolute Eos # 08/08/2022 0.0  0.0 - 0.8 K/UL Final    Basophils Absolute 08/08/2022 0.0  0.0 - 0.2 K/UL Final    Absolute Immature Granulocyte 08/08/2022 0.0  0.0 - 0.5 K/UL Final    Differential Type 08/08/2022 AUTOMATED    Final         Treatment Summary has been discussed and given to patient: no        -------------------------------------------------------------------------------------------------------------------  Please call our office at [phone] if you have any  of the following symptoms:   Fever of 100.5 or greater  Chills  Shortness of breath  Swelling or pain in one leg    After office hours an answering service is available and will contact a provider for emergencies or if you are experiencing any of the above symptoms. Patient did express an interest in My Chart. My Chart log in information explained on the after visit summary printout at the [employer] [facility] desk. Aleks Turner RN, BSN  Nurse Navigator  [phone] cell  [email or web address]      Mouth care  Check with your doctor before having any dental work done. Chemo can cause you to develop mouth sores or tenderness  Avoid spicy, acidic, salty, crunchy foods or beverages. Eat soft foods. Use soft toothbrush and brush after every meal   Use alcohol free mouthwash and toothpaste which can be irritating to mouth  Biotene mouthwash is a good option                Warm salt water and/or baking soda rinses several times a day, especially after eating to keep mouth clean. 1/2 tsp salt + 1/2 tsp baking soda in 8 oz of water. If mouth sores continue call, there are prescription mouth washes we can call in to your pharmacy  Dry mouth - over the counter xylimelts or mouth kote  Drink plenty of fluids to keep mouth moist.  Hard candies can help also. Oral thrush is common also when receiving chemo. This is a white coating of the mouth and tongue. Call if you have these symptoms because this will require a prescription to clear up.

## 2022-08-08 NOTE — PROGRESS NOTES
Denies heat/cold intolerance, denies diabetes. MSK Back pain. Denies swollen legs, myalgias and falls. Psychiatric/Behavioral Denies depression and substance abuse. The patient is not nervous/anxious. No Known Allergies  Past Medical History:   Diagnosis Date    Benign essential hypertension     controlled with med    Diabetes mellitus type 2, noninsulin dependent ([address]     oral reliant; AVG -105; pt does have s.s of hypoglycemia but does not know what BS is when it gets low; last A1C    Fatty liver     per pt    Hyperlipidemia     managed with med    Testicular mass     Left     Past Surgical History:   Procedure Laterality Date    APPENDECTOMY      IR PORT PLACEMENT EQUAL OR GREATER THAN 5 YEARS  7/22/2022    IR PORT PLACEMENT EQUAL OR GREATER THAN 5 YEARS 7/22/2022 [facility] SPECI[facility]    KIDNEY STONE SURGERY Right 2011    TESTICLE REMOVAL Left 7/7/2022    LEFT RADICAL ORCHIECTOMY Jackqueline Charles APPROACH performed by Keli Hooper MD at [facility] MAIN OR     History reviewed. No pertinent family history.   Social History     Socioeconomic History    Marital status:      Spouse name: Not on file    Number of children: Not on file    Years of education: Not on file    Highest education level: Not on file   Occupational History    Not on file   Tobacco Use    Smoking status: Never    Smokeless tobacco: Never   Vaping Use    Vaping Use: Never used   Substance and Sexual Activity    Alcohol use: Yes     Comment: one or two per week    Drug use: Never    Sexual activity: Not on file   Other Topics Concern    Not on file   Social History Narrative    Not on file     Social Determinants of Health     Financial Resource Strain: Not on file   Food Insecurity: Not on file   Transportation Needs: Not on file   Physical Activity: Not on file   Stress: Not on file   Social Connections: Not on file   Intimate Partner Violence: Not on file   Housing Stability: Not on file     Current Outpatient Medications Medication Sig Dispense Refill    chlorhexidine (PERIDEX) 0.12 % solution Take 15 mLs by mouth in the morning and 15 mLs before bedtime. Do all this for 14 days. 420 mL 0    HYDROcodone-acetaminophen (NORCO) 7.5-325 MG per tablet PLEASE SEE ATTACHED FOR DETAILED DIRECTIONS      lidocaine-prilocaine (EMLA) 2.5-2.5 % cream Apply topically as needed. 1 each 2    ondansetron (ZOFRAN-ODT) 8 MG TBDP disintegrating tablet Place 1 tablet under the tongue every 8 hours as needed for Nausea or Vomiting 90 tablet 2    prochlorperazine (COMPAZINE) 10 MG tablet Take 1 tablet by mouth every 6 hours as needed (cinv) 90 tablet 2    sennosides-docusate sodium (SENOKOT-S) 8.6-50 MG tablet Take 1 tablet by mouth daily as needed for Constipation 30 tablet 0    telmisartan-amLODIPine (TWYNSTA) 80-5 MG TABS tablet Take 1 tablet by mouth daily      atenolol (TENORMIN) 50 MG tablet Take 50 mg by mouth daily      atorvastatin (LIPITOR) 20 MG tablet Take 20 mg by mouth nightly      metFORMIN (GLUCOPHAGE-XR) 500 MG extended release tablet Take 2 with breakfast and supper - Diabetes      spironolactone (ALDACTONE) 50 MG tablet Take 50 mg by mouth daily       No current facility-administered medications for this visit. Facility-Administered Medications Ordered in Other Visits   Medication Dose Route Frequency Provider Last Rate Last Admin    sodium chloride flush 0.9 % injection 10 mL  10 mL IntraVENous PRN Vicki Encarnacion MD   10 mL at 08/08/22 0958       OBJECTIVE:  /87 (Site: Right Upper Arm, Position: Standing)   Pulse 95   Temp 98.6 °F (37 °C)   Resp 14   Ht 6' 1\" (1.854 m)   Wt 237 lb 12.8 oz (107.9 kg)   SpO2 97%   BMI 31.37 kg/m²     Physical Exam:  Constitutional: Oriented to person, place, and time. Well-developed and well-nourished. HEENT: Normocephalic and atraumatic. Oropharynx is clear and moist.   Conjunctivae and EOM are normal. Pupils are equal, round, and reactive to light. No scleral icterus.  Neck supple. No JVD present. No tracheal deviation present. No thyromegaly present. Lymph node No palpable submandibular, cervical, supraclavicular, axillary and inguinal lymph nodes. Skin Warm and dry. No bruising and no rash noted. No erythema. No pallor. Respiratory Effort normal and breath sounds normal.  No respiratory distress. No wheezes. No rales. No tenderness. CVS Normal rate, regular rhythm and normal heart sounds. Exam reveals no gallop, no friction and no rub. No murmur heard. Abdomen Status post left orchiectomy. Right testis soft nontender no mass. Soft. Bowel sounds are normal. Exhibits no distension. There is no tenderness. There is no rebound and no guarding. Neuro Normal reflexes. No cranial nerve deficit. Exhibits normal muscle tone, 5 of 5 strength of all extremities. MSK Normal range of motion in general.  No edema and no tenderness.    Psych Normal mood, affect, behavior, judgment and thought content      Labs:  Recent Results (from the past 24 hour(s))   CBC with Auto Differential    Collection Time: 08/08/22  9:48 AM   Result Value Ref Range    WBC 0.9 (LL) 4.3 - 11.1 K/uL    RBC 4.81 4.23 - 5.6 M/uL    Hemoglobin 14.0 13.6 - 17.2 g/dL    Hematocrit 40.6 %    MCV 84.4 79.6 - 97.8 FL    MCH 29.1 26.1 - 32.9 PG    MCHC 34.5 31.4 - 35.0 g/dL    RDW 11.9 11.9 - 14.6 %    Platelets 114 (L) 702 - 450 K/uL    MPV 8.7 (L) 9.4 - 12.3 FL    nRBC 0.00 0.0 - 0.2 K/uL    Seg Neutrophils 7 (L) 43 - 78 %    Lymphocytes 62 (H) 13 - 44 %    Monocytes 24 (H) 4.0 - 12.0 %    Eosinophils % 5 0.5 - 7.8 %    Basophils 1 0.0 - 2.0 %    Immature Granulocytes 1 0.0 - 5.0 %    Segs Absolute 0.1 (L) 1.7 - 8.2 K/UL    Absolute Lymph # 0.5 0.5 - 4.6 K/UL    Absolute Mono # 0.2 0.1 - 1.3 K/UL    Absolute Eos # 0.0 0.0 - 0.8 K/UL    Basophils Absolute 0.0 0.0 - 0.2 K/UL    Absolute Immature Granulocyte 0.0 0.0 - 0.5 K/UL    Differential Type AUTOMATED     Comprehensive Metabolic Panel Collection Time: 08/08/22  9:48 AM   Result Value Ref Range    Sodium 139 136 - 145 mmol/L    Potassium 3.7 3.5 - 5.1 mmol/L    Chloride 106 98 - 107 mmol/L    CO2 25 21 - 32 mmol/L    Anion Gap 8 7 - 16 mmol/L    Glucose 156 (H) 65 - 100 mg/dL    BUN 9 6 - 23 MG/DL    Creatinine 1.10 0.8 - 1.5 MG/DL    GFR African American >60 >60 ml/min/1.73m2    GFR Non- >60 >60 ml/min/1.73m2    Calcium 8.9 8.3 - 10.4 MG/DL    Total Bilirubin 0.6 0.2 - 1.1 MG/DL    ALT 26 12 - 65 U/L    AST 8 (L) 15 - 37 U/L    Alk Phosphatase 107 50 - 136 U/L    Total Protein 7.6 6.3 - 8.2 g/dL    Albumin 3.3 (L) 3.5 - 5.0 g/dL    Globulin 4.3 (H) 2.3 - 3.5 g/dL    Albumin/Globulin Ratio 0.8 (L) 1.2 - 3.5         Imaging:  No results found for this or any previous visit. 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 High risk medication use        3. Pain        4. Mucositis        5.  Chemotherapy-induced neutropenia ([facility] [address]              38 y.o. 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, he need tumor markers for further diagnosis, patient and wife are tearful and discussed that this was a potentially rather curable cancer, typically would pursue inguinal radical orchiectomy for diagnosis and local disease control, nonetheless he is a typical case radiographically if beta-hCG is highly elevated, and the severe narrowing of IVC is of clinical concern of complication unless chemotherapy is started soon, discussed with uroncology and may start chemo urgently if needed, medications, Communicating with other health care professionals and Referring patient to another health care provider. Elements of this note have been dictated via voice recognition software. Text and phrases may be limited by the accuracy and autoconversion of the software. The chart has been reviewed, but errors may still be present. Rosa Shay M.D.   [address]  Office : [phone]  Fax : [phone]

## 2022-08-15 ENCOUNTER — HOSPITAL ENCOUNTER (OUTPATIENT)
Dept: INFUSION THERAPY | Age: 42
Discharge: HOME | End: 2022-08-15
Payer: COMMERCIAL

## 2022-08-15 ENCOUNTER — CLINICAL DOCUMENTATION (OUTPATIENT)
Dept: CASE MANAGEMENT | Age: 42
End: 2022-08-15

## 2022-08-15 ENCOUNTER — OFFICE VISIT (OUTPATIENT)
Dept: ONCOLOGY | Age: 42
End: 2022-08-15
Payer: COMMERCIAL

## 2022-08-15 VITALS
SYSTOLIC BLOOD PRESSURE: 118 MMHG | HEART RATE: 66 BPM | BODY MASS INDEX: 31.63 KG/M2 | HEIGHT: 73 IN | WEIGHT: 238.7 LBS | OXYGEN SATURATION: 97 % | DIASTOLIC BLOOD PRESSURE: 83 MMHG | TEMPERATURE: 97.4 F | RESPIRATION RATE: 16 BRPM

## 2022-08-15 DIAGNOSIS — C62.90 SEMINOMA (HCC): Primary | ICD-10-CM

## 2022-08-15 DIAGNOSIS — R52 PAIN: ICD-10-CM

## 2022-08-15 DIAGNOSIS — C62.90 SEMINOMA (HCC): ICD-10-CM

## 2022-08-15 DIAGNOSIS — T45.1X5A CINV (CHEMOTHERAPY-INDUCED NAUSEA AND VOMITING): ICD-10-CM

## 2022-08-15 DIAGNOSIS — Z79.899 HIGH RISK MEDICATION USE: ICD-10-CM

## 2022-08-15 DIAGNOSIS — R11.2 CINV (CHEMOTHERAPY-INDUCED NAUSEA AND VOMITING): ICD-10-CM

## 2022-08-15 LAB
ALBUMIN SERPL-MCNC: 3.5 G/DL (ref 3.5–5)
ALBUMIN/GLOB SERPL: 0.9 {RATIO} (ref 1.2–3.5)
ALP SERPL-CCNC: 118 U/L (ref 50–136)
ALT SERPL-CCNC: 30 U/L (ref 12–65)
ANION GAP SERPL CALC-SCNC: 7 MMOL/L (ref 7–16)
AST SERPL-CCNC: 18 U/L (ref 15–37)
BASOPHILS # BLD: 0.1 K/UL (ref 0–0.2)
BASOPHILS NFR BLD: 1 % (ref 0–2)
BILIRUB SERPL-MCNC: 0.3 MG/DL (ref 0.2–1.1)
BUN SERPL-MCNC: 15 MG/DL (ref 6–23)
CALCIUM SERPL-MCNC: 8.9 MG/DL (ref 8.3–10.4)
CHLORIDE SERPL-SCNC: 107 MMOL/L (ref 98–107)
CO2 SERPL-SCNC: 23 MMOL/L (ref 21–32)
CREAT SERPL-MCNC: 1.1 MG/DL (ref 0.8–1.5)
DIFFERENTIAL METHOD BLD: ABNORMAL
EOSINOPHIL # BLD: 0 K/UL (ref 0–0.8)
EOSINOPHIL NFR BLD: 0 % (ref 0.5–7.8)
ERYTHROCYTE [DISTWIDTH] IN BLOOD BY AUTOMATED COUNT: 12.1 % (ref 11.9–14.6)
GLOBULIN SER CALC-MCNC: 4 G/DL (ref 2.3–3.5)
GLUCOSE SERPL-MCNC: 138 MG/DL (ref 65–100)
HCT VFR BLD AUTO: 40.9 %
HGB BLD-MCNC: 13.9 G/DL (ref 13.6–17.2)
IMM GRANULOCYTES # BLD AUTO: 1.3 K/UL (ref 0–0.5)
IMM GRANULOCYTES NFR BLD AUTO: 20 % (ref 0–5)
LYMPHOCYTES # BLD: 1.4 K/UL (ref 0.5–4.6)
LYMPHOCYTES NFR BLD: 22 % (ref 13–44)
MCH RBC QN AUTO: 28.5 PG (ref 26.1–32.9)
MCHC RBC AUTO-ENTMCNC: 34 G/DL (ref 31.4–35)
MCV RBC AUTO: 83.8 FL (ref 79.6–97.8)
MONOCYTES # BLD: 0.8 K/UL (ref 0.1–1.3)
MONOCYTES NFR BLD: 12 % (ref 4–12)
NEUTS SEG # BLD: 2.7 K/UL (ref 1.7–8.2)
NEUTS SEG NFR BLD: 45 % (ref 43–78)
NRBC # BLD: 0 K/UL (ref 0–0.2)
PLATELET # BLD AUTO: 281 K/UL (ref 150–450)
PLATELET COMMENT: ADEQUATE
PMV BLD AUTO: 8.4 FL (ref 9.4–12.3)
POTASSIUM SERPL-SCNC: 4.2 MMOL/L (ref 3.5–5.1)
PROT SERPL-MCNC: 7.5 G/DL (ref 6.3–8.2)
RBC # BLD AUTO: 4.88 M/UL (ref 4.23–5.6)
RBC MORPH BLD: ABNORMAL
SODIUM SERPL-SCNC: 137 MMOL/L (ref 136–145)
WBC # BLD AUTO: 6.3 K/UL (ref 4.3–11.1)
WBC MORPH BLD: ABNORMAL

## 2022-08-15 PROCEDURE — 85025 COMPLETE CBC W/AUTO DIFF WBC: CPT

## 2022-08-15 PROCEDURE — 80053 COMPREHEN METABOLIC PANEL: CPT

## 2022-08-15 PROCEDURE — 6360000002 HC RX W HCPCS: Performed by: INTERNAL MEDICINE

## 2022-08-15 PROCEDURE — 36591 DRAW BLOOD OFF VENOUS DEVICE: CPT

## 2022-08-15 PROCEDURE — 96409 CHEMO IV PUSH SNGL DRUG: CPT

## 2022-08-15 PROCEDURE — 6370000000 HC RX 637 (ALT 250 FOR IP): Performed by: INTERNAL MEDICINE

## 2022-08-15 PROCEDURE — 96375 TX/PRO/DX INJ NEW DRUG ADDON: CPT

## 2022-08-15 PROCEDURE — 2580000003 HC RX 258: Performed by: INTERNAL MEDICINE

## 2022-08-15 PROCEDURE — 99215 OFFICE O/P EST HI 40 MIN: CPT | Performed by: INTERNAL MEDICINE

## 2022-08-15 RX ORDER — SODIUM CHLORIDE 0.9 % (FLUSH) 0.9 %
10 SYRINGE (ML) INJECTION PRN
Status: DISCONTINUED | OUTPATIENT
Start: 2022-08-15 | End: 2022-08-16 | Stop reason: HOSPADM

## 2022-08-15 RX ORDER — SODIUM CHLORIDE 0.9 % (FLUSH) 0.9 %
5-40 SYRINGE (ML) INJECTION PRN
Status: DISCONTINUED | OUTPATIENT
Start: 2022-08-15 | End: 2022-08-16 | Stop reason: HOSPADM

## 2022-08-15 RX ORDER — DIPHENHYDRAMINE HYDROCHLORIDE 50 MG/ML
25 INJECTION INTRAMUSCULAR; INTRAVENOUS ONCE
Status: COMPLETED | OUTPATIENT
Start: 2022-08-15 | End: 2022-08-15

## 2022-08-15 RX ORDER — SODIUM CHLORIDE 9 MG/ML
5-250 INJECTION, SOLUTION INTRAVENOUS PRN
Status: DISCONTINUED | OUTPATIENT
Start: 2022-08-15 | End: 2022-08-16 | Stop reason: HOSPADM

## 2022-08-15 RX ORDER — ACETAMINOPHEN 325 MG/1
650 TABLET ORAL ONCE
Status: COMPLETED | OUTPATIENT
Start: 2022-08-15 | End: 2022-08-15

## 2022-08-15 RX ADMIN — SODIUM CHLORIDE 25 ML/HR: 9 INJECTION, SOLUTION INTRAVENOUS at 10:43

## 2022-08-15 RX ADMIN — BLEOMYCIN 30 UNITS: 30 INJECTION, POWDER, LYOPHILIZED, FOR SOLUTION INTRAMUSCULAR; INTRAPLEURAL; INTRAVENOUS; SUBCUTANEOUS at 11:03

## 2022-08-15 RX ADMIN — SODIUM CHLORIDE, PRESERVATIVE FREE 10 ML: 5 INJECTION INTRAVENOUS at 10:48

## 2022-08-15 RX ADMIN — SODIUM CHLORIDE, PRESERVATIVE FREE 10 ML: 5 INJECTION INTRAVENOUS at 09:05

## 2022-08-15 RX ADMIN — DIPHENHYDRAMINE HYDROCHLORIDE 25 MG: 50 INJECTION, SOLUTION INTRAMUSCULAR; INTRAVENOUS at 10:46

## 2022-08-15 RX ADMIN — ACETAMINOPHEN 650 MG: 325 TABLET ORAL at 10:46

## 2022-08-15 ASSESSMENT — PATIENT HEALTH QUESTIONNAIRE - PHQ9
SUM OF ALL RESPONSES TO PHQ QUESTIONS 1-9: 0
SUM OF ALL RESPONSES TO PHQ QUESTIONS 1-9: 0
SUM OF ALL RESPONSES TO PHQ9 QUESTIONS 1 & 2: 0
SUM OF ALL RESPONSES TO PHQ QUESTIONS 1-9: 0
2. FEELING DOWN, DEPRESSED OR HOPELESS: 0
SUM OF ALL RESPONSES TO PHQ QUESTIONS 1-9: 0
1. LITTLE INTEREST OR PLEASURE IN DOING THINGS: 0

## 2022-08-15 NOTE — PATIENT INSTRUCTIONS
Patient Instructions from Today's Visit    Reason for Visit:  Pre chemo cycle 1 day 15 BEP     Plan:  Proceed to infusion     Follow Up:  1 week    Recent Lab Results:  Hospital Outpatient Visit on 08/15/2022   Component Date Value Ref Range Status    WBC 08/15/2022 6.3  4.3 - 11.1 K/uL Final    PERIPHERAL REVIEW TO FOLLOW    RBC 08/15/2022 4.88  4.23 - 5.6 M/uL Final    Hemoglobin 08/15/2022 13.9  13.6 - 17.2 g/dL Final    Hematocrit 08/15/2022 40.9  % Final    MCV 08/15/2022 83.8  79.6 - 97.8 FL Final    MCH 08/15/2022 28.5  26.1 - 32.9 PG Final    MCHC 08/15/2022 34.0  31.4 - 35.0 g/dL Final    RDW 08/15/2022 12.1  11.9 - 14.6 % Final    Platelets 53/83/1461 281  150 - 450 K/uL Final    MPV 08/15/2022 8.4 (A) 9.4 - 12.3 FL Final    nRBC 08/15/2022 0.00  0.0 - 0.2 K/uL Final    **Note: Absolute NRBC parameter is now reported with Hemogram**    Differential Type 08/15/2022 PENDING   Incomplete    Sodium 08/15/2022 137  136 - 145 mmol/L Final    Potassium 08/15/2022 4.2  3.5 - 5.1 mmol/L Final    Chloride 08/15/2022 107  98 - 107 mmol/L Final    CO2 08/15/2022 23  21 - 32 mmol/L Final    Anion Gap 08/15/2022 7  7 - 16 mmol/L Final    Glucose 08/15/2022 138 (A) 65 - 100 mg/dL Final    BUN 08/15/2022 15  6 - 23 MG/DL Final    Creatinine 08/15/2022 1.10  0.8 - 1.5 MG/DL Final    GFR  08/15/2022 >60  >60 ml/min/1.73m2 Final    GFR Non- 08/15/2022 >60  >60 ml/min/1.73m2 Final    Comment:      Estimated GFR is calculated using the Modification of Diet in Renal Disease (MDRD) Study equation, reported for both  Americans (GFRAA) and non- Americans (GFRNA), and normalized to 1.73m2 body surface area. The physician must decide which value applies to the patient. The MDRD study equation should only be used in individuals age 25 or older.  It has not been validated for the following: pregnant women, patients with serious comorbid conditions,or on certain medications, or persons with extremes of body size, muscle mass, or nutritional status. Calcium 08/15/2022 8.9  8.3 - 10.4 MG/DL Final    Total Bilirubin 08/15/2022 0.3  0.2 - 1.1 MG/DL Final    ALT 08/15/2022 30  12 - 65 U/L Final    AST 08/15/2022 18  15 - 37 U/L Final    Alk Phosphatase 08/15/2022 118  50 - 136 U/L Final    Total Protein 08/15/2022 7.5  6.3 - 8.2 g/dL Final    Albumin 08/15/2022 3.5  3.5 - 5.0 g/dL Final    Globulin 08/15/2022 4.0 (A) 2.3 - 3.5 g/dL Final    Albumin/Globulin Ratio 08/15/2022 0.9 (A) 1.2 - 3.5   Final         Treatment Summary has been discussed and given to patient: no        -------------------------------------------------------------------------------------------------------------------  Please call our office at [phone] if you have any  of the following symptoms:   Fever of 100.5 or greater  Chills  Shortness of breath  Swelling or pain in one leg    After office hours an answering service is available and will contact a provider for emergencies or if you are experiencing any of the above symptoms. Patient did express an interest in My Chart. My Chart log in information explained on the after visit summary printout at the [address] desk.     Martín Kelsey RN, BSN  Nurse Navigator  [phone] live [email or web address]

## 2022-08-15 NOTE — PROGRESS NOTES
Patient arrived to [facility] for Bleomycin. Assessment completed. No needs voiced at this time. Patient tolerated infusion well and is aware of next appointment on 8/22/2022 @3143. Patient discharged ambulatory.

## 2022-08-15 NOTE — PROGRESS NOTES
3 [facility] Hematology & Oncology: Office Visit Progress Note    Chief Complaint:    Testicular cancer    History of Present Illness:  38 y.o. male past medical history of hypertension, hyperlipidemia, diabetes, presented to the emergency room with testicle swelling and lower back and abdominal pain. Reported low back pain intermittently over the past month, did not have work-up yet. Now he noticed testicular swelling with pain and presented to the emergency room for further evaluation. Scrotal ultrasound showed large left side testicular mass highly concerning for testicular malignancy measuring 4.4 x 3.7 x 3.9 cm. CT showed massive retroperitoneal lymph nodes conglomerates and a largest retrocrural lymph nodes suspicious for lymphoma versus metastatic disease. There is severe luminal narrowing of the IVC given the mass-effect upon the IVC and mass-effect upon the abdominal aorta as well as the iliopsoas musculature. Oncology is consulted for recommendation. 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 Review of Systems:  Constitutional Anorexia, fatigue. Weight loss. Denies fever or chills. HEENT Denies trauma, bluring vision, hearing loss, ear pain, nosebleeds, sore throat, neck pain and ear discharge. Skin Denies lesions or rashes. Lungs Denies shortness of breath, cough, sputum production or hemoptysis. Cardiovascular Denies, palpitations, orthopnea, claudication and leg swelling. Gastrointestinal Denies nausea, vomiting, bowel changes. Denies bloody or black stools. Denies abdominal pain.  Status post left orchiectomy. Denies dysuria, frequency or hesitancy of urination   Neuro Denies headaches, visual changes or ataxia. Denies dizziness, tingling, tremors, sensory change, speech change, focal weakness and headaches.      Hematology Denies nasal/gum bleeding, denies easy bruise   Endo Denies heat/cold intolerance, denies diabetes. MSK Back pain. Denies swollen legs, myalgias and falls. Psychiatric/Behavioral Denies depression and substance abuse. The patient is not nervous/anxious. No Known Allergies  Past Medical History:   Diagnosis Date    Benign essential hypertension     controlled with med    Diabetes mellitus type 2, noninsulin dependent ([address]     oral reliant; AVG -105; pt does have s.s of hypoglycemia but does not know what BS is when it gets low; last A1C    Fatty liver     per pt    Hyperlipidemia     managed with med    Testicular mass     Left     Past Surgical History:   Procedure Laterality Date    APPENDECTOMY      IR PORT PLACEMENT EQUAL OR GREATER THAN 5 YEARS  7/22/2022    IR PORT PLACEMENT EQUAL OR GREATER THAN 5 YEARS 7/22/2022 [facility] SPE[facility]    KIDNEY STONE SURGERY Right 2011    TESTICLE REMOVAL Left 7/7/2022    LEFT RADICAL ORCHIECTOMY Bernetta Re APPROACH performed by Sarah Hart MD at [facility] MAIN OR     No family history on file.   Social History     Socioeconomic History    Marital status:      Spouse name: Not on file    Number of children: Not on file    Years of education: Not on file    Highest education level: Not on file   Occupational History    Not on file   Tobacco Use    Smoking status: Never    Smokeless tobacco: Never   Vaping Use    Vaping Use: Never used   Substance and Sexual Activity    Alcohol use: Yes     Comment: one or two per week    Drug use: Never    Sexual activity: Not on file   Other Topics Concern    Not on file   Social History Narrative    Not on file     Social Determinants of Health     Financial Resource Strain: Not on file   Food Insecurity: Not on file   Transportation Needs: Not on file   Physical Activity: Not on file   Stress: Not on file   Social Connections: Not on file   Intimate Partner Violence: Not on file   Housing Stability: Not on file     Current Outpatient Medications   Medication Sig Dispense Refill    chlorhexidine (PERIDEX) 0.12 % solution Take 15 mLs by mouth in the morning and 15 mLs before bedtime. Do all this for 14 days. 420 mL 0    HYDROcodone-acetaminophen (NORCO) 7.5-325 MG per tablet PLEASE SEE ATTACHED FOR DETAILED DIRECTIONS      lidocaine-prilocaine (EMLA) 2.5-2.5 % cream Apply topically as needed. 1 each 2    ondansetron (ZOFRAN-ODT) 8 MG TBDP disintegrating tablet Place 1 tablet under the tongue every 8 hours as needed for Nausea or Vomiting 90 tablet 2    prochlorperazine (COMPAZINE) 10 MG tablet Take 1 tablet by mouth every 6 hours as needed (cinv) 90 tablet 2    sennosides-docusate sodium (SENOKOT-S) 8.6-50 MG tablet Take 1 tablet by mouth daily as needed for Constipation 30 tablet 0    telmisartan-amLODIPine (TWYNSTA) 80-5 MG TABS tablet Take 1 tablet by mouth daily      atenolol (TENORMIN) 50 MG tablet Take 50 mg by mouth daily      atorvastatin (LIPITOR) 20 MG tablet Take 20 mg by mouth nightly      metFORMIN (GLUCOPHAGE-XR) 500 MG extended release tablet Take 2 with breakfast and supper - Diabetes      spironolactone (ALDACTONE) 50 MG tablet Take 50 mg by mouth daily       No current facility-administered medications for this visit.      Facility-Administered Medications Ordered in Other Visits   Medication Dose Route Frequency Provider Last Rate Last Admin    0.9 % sodium chloride infusion  5-250 mL/hr IntraVENous PRN Vicki Tariq MD        acetaminophen (TYLENOL) tablet 650 mg  650 mg Oral Once Vicki Tariq MD        diphenhydrAMINE (BENADRYL) injection 25 mg  25 mg IntraVENous Once Vicki Tariq MD        bleomycin (BLEOCIN) 30 Units in sodium chloride 0.9 % 50 mL chemo IVPB  30 Units IntraVENous Once Vicki Tariq MD        sodium chloride flush 0.9 % injection 5-40 mL  5-40 mL IntraVENous PRN Vicki Tariq MD           OBJECTIVE:  /83 (Site: Right Upper Arm, Position: Sitting, Cuff Size: Medium Adult)   Pulse 66   Temp 97.4 °F (36.3 °C) (Oral) Resp 16   Ht 6' 1\" (1.854 m)   Wt 238 lb 11.2 oz (108.3 kg)   SpO2 97%   BMI 31.49 kg/m²     Physical Exam:  Constitutional: Oriented to person, place, and time. Well-developed and well-nourished. HEENT: Normocephalic and atraumatic. Oropharynx is clear and moist.   Conjunctivae and EOM are normal. Pupils are equal, round, and reactive to light. No scleral icterus. Neck supple. No JVD present. No tracheal deviation present. No thyromegaly present. Lymph node No palpable submandibular, cervical, supraclavicular, axillary and inguinal lymph nodes. Skin Warm and dry. No bruising and no rash noted. No erythema. No pallor. Respiratory Effort normal and breath sounds normal.  No respiratory distress. No wheezes. No rales. No tenderness. CVS Normal rate, regular rhythm and normal heart sounds. Exam reveals no gallop, no friction and no rub. No murmur heard. Abdomen Status post left orchiectomy. Right testis soft nontender no mass. Soft. Bowel sounds are normal. Exhibits no distension. There is no tenderness. There is no rebound and no guarding. Neuro Normal reflexes. No cranial nerve deficit. Exhibits normal muscle tone, 5 of 5 strength of all extremities. MSK Normal range of motion in general.  No edema and no tenderness.    Psych Normal mood, affect, behavior, judgment and thought content      Labs:  Recent Results (from the past 24 hour(s))   CBC with Auto Differential    Collection Time: 08/15/22  9:05 AM   Result Value Ref Range    WBC 6.3 4.3 - 11.1 K/uL    RBC 4.88 4.23 - 5.6 M/uL    Hemoglobin 13.9 13.6 - 17.2 g/dL    Hematocrit 40.9 %    MCV 83.8 79.6 - 97.8 FL    MCH 28.5 26.1 - 32.9 PG    MCHC 34.0 31.4 - 35.0 g/dL    RDW 12.1 11.9 - 14.6 %    Platelets 940 132 - 072 K/uL    MPV 8.4 (L) 9.4 - 12.3 FL    nRBC 0.00 0.0 - 0.2 K/uL    Seg Neutrophils 45 43 - 78 %    Lymphocytes 22 13 - 44 %    Monocytes 12 4.0 - 12.0 %    Eosinophils % 0 (L) 0.5 - 7.8 %    Basophils 1 0.0 - 2.0 %    Immature Granulocytes 20 (H) 0.0 - 5.0 %    Segs Absolute 2.7 1.7 - 8.2 K/UL    Absolute Lymph # 1.4 0.5 - 4.6 K/UL    Absolute Mono # 0.8 0.1 - 1.3 K/UL    Absolute Eos # 0.0 0.0 - 0.8 K/UL    Basophils Absolute 0.1 0.0 - 0.2 K/UL    Absolute Immature Granulocyte 1.3 (H) 0.0 - 0.5 K/UL    RBC Comment NORMOCYTIC/NORMOCHROMIC      WBC Comment Result Confirmed By Smear      Platelet Comment ADEQUATE      Differential Type AUTOMATED     Comprehensive Metabolic Panel    Collection Time: 08/15/22  9:05 AM   Result Value Ref Range    Sodium 137 136 - 145 mmol/L    Potassium 4.2 3.5 - 5.1 mmol/L    Chloride 107 98 - 107 mmol/L    CO2 23 21 - 32 mmol/L    Anion Gap 7 7 - 16 mmol/L    Glucose 138 (H) 65 - 100 mg/dL    BUN 15 6 - 23 MG/DL    Creatinine 1.10 0.8 - 1.5 MG/DL    GFR African American >60 >60 ml/min/1.73m2    GFR Non- >60 >60 ml/min/1.73m2    Calcium 8.9 8.3 - 10.4 MG/DL    Total Bilirubin 0.3 0.2 - 1.1 MG/DL    ALT 30 12 - 65 U/L    AST 18 15 - 37 U/L    Alk Phosphatase 118 50 - 136 U/L    Total Protein 7.5 6.3 - 8.2 g/dL    Albumin 3.5 3.5 - 5.0 g/dL    Globulin 4.0 (H) 2.3 - 3.5 g/dL    Albumin/Globulin Ratio 0.9 (L) 1.2 - 3.5         Imaging:  No results found for this or any previous visit. ASSESSMENT/PLAN:   Diagnosis Orders   1. Seminoma (HCC)  CBC With Auto Differential    CBC with Auto Differential    Comprehensive Metabolic Panel    AFP Tumor Marker    HCG, Quantitative, Pregnancy    Lactate Dehydrogenase      2. High risk medication use        3. Pain        4.  CINV (chemotherapy-induced nausea and vomiting)                38 y.o. 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, he need tumor markers for further diagnosis, patient and wife are tearful and discussed that this was a potentially rather curable cancer, typically would pursue inguinal radical orchiectomy for diagnosis and local disease control, nonetheless he is a typical case radiographically if beta-hCG is highly elevated, and the severe narrowing of IVC is of clinical concern of complication unless chemotherapy is started soon, discussed with uroncology and may start chemo urgently if needed, discussed options with patient and he agrees with starting chemotherapy as soon as possible to avoid further complication, elevated beta-hCG and normal AFP consistent with seminoma, completed sperm banking and received a left radical inguinal orchiectomy on 7/7/2022 and the pathology showed pure seminoma, followed in office on 7/19/2022 and we discussed the result, beta-hCG and LDH increasing rapidly despite of the resection, increased back pain and night sweat, placed port and start BEP x3 7/25/2022, return on 8/1/2022, hCG down to 82, however not feeling well and lost 14 pounds in the week, not short of breath but reports some atypical chest pain that lasts for seconds each time, tachycardic and blood pressure lower than baseline, stat CT chest to rule out PE, arrange IV fluid for dehydration and received a day 8 bleomycin, return 8/8/2022, chest pain and short of breath resolved, but having mucositis with difficulty eating, prescribed Peridex and Magic mouthwash, discussed ways to improve nutrition and hydration, return on 8/15/2022 and labs recovered, proceed to cycle 1 day 15, antiemetics as needed, refill Emla, return next week for cycle 2 but call as needed. Baseline PFT WNL. All questions are answered to their satisfaction. They will call for further questions and concerns.     ECOG PERFORMANCE STATUS - 0-Fully active, able to carry on all pre-disease performance without restriction. Pain - 0 - No pain/10. Mild to moderate pain, requiring medication - see MAR     Fatigue - No flowsheet data found. Distress - No flowsheet data found. Total time independently spent on today's visit was 40min. This time included: face-to-face time evaluating the patient as well as additional non-face-to-face time spent on: Preparing to see the patient by obtaining and reviewing previous test results, records and medical history, Performing a medically appropriate history and exam and documenting relevant clinical information for this visit, Counseling and educating patient and family, Ordering medications, Communicating with other health care professionals and Referring patient to another health care provider. Elements of this note have been dictated via voice recognition software. Text and phrases may be limited by the accuracy and autoconversion of the software. The chart has been reviewed, but errors may still be present. Chrystine Sandhoff, M.D.   [address]  Office : [phone]  Fax : [phone]

## 2022-08-15 NOTE — PROGRESS NOTES
8/15/22 saw pt today with Dr. Megan Hurtado for pre chemo [id number]. He feels well this morning. PO intake is great. Denies any issues. Proceed to infusion. Follow up 1 week for cycle 2. Encouraged to call with any concerns. Navigation will continue to follow.

## 2022-08-15 NOTE — PROGRESS NOTES
Per CMM: \"Your PA for EMLA cream has been resolved, no additional PA is required. For further inquiries please contact the number on the back of the member prescription card. (Message [phone]). \"

## 2022-08-22 ENCOUNTER — HOSPITAL ENCOUNTER (OUTPATIENT)
Dept: INFUSION THERAPY | Age: 42
Discharge: HOME | End: 2022-08-22
Payer: COMMERCIAL

## 2022-08-22 ENCOUNTER — OFFICE VISIT (OUTPATIENT)
Dept: ONCOLOGY | Age: 42
End: 2022-08-22
Payer: COMMERCIAL

## 2022-08-22 ENCOUNTER — CLINICAL DOCUMENTATION (OUTPATIENT)
Dept: CASE MANAGEMENT | Age: 42
End: 2022-08-22

## 2022-08-22 VITALS
RESPIRATION RATE: 16 BRPM | WEIGHT: 238.6 LBS | HEIGHT: 73 IN | HEART RATE: 77 BPM | SYSTOLIC BLOOD PRESSURE: 113 MMHG | OXYGEN SATURATION: 97 % | BODY MASS INDEX: 31.62 KG/M2 | DIASTOLIC BLOOD PRESSURE: 79 MMHG | TEMPERATURE: 98.7 F

## 2022-08-22 DIAGNOSIS — Z79.899 HIGH RISK MEDICATION USE: ICD-10-CM

## 2022-08-22 DIAGNOSIS — G47.00 INSOMNIA, UNSPECIFIED TYPE: ICD-10-CM

## 2022-08-22 DIAGNOSIS — C62.90 SEMINOMA (HCC): Primary | ICD-10-CM

## 2022-08-22 DIAGNOSIS — C62.90 SEMINOMA (HCC): ICD-10-CM

## 2022-08-22 LAB
AFP-TM SERPL-MCNC: 2.4 NG/ML
ALBUMIN SERPL-MCNC: 3.8 G/DL (ref 3.5–5)
ALBUMIN/GLOB SERPL: 0.9 {RATIO} (ref 1.2–3.5)
ALP SERPL-CCNC: 103 U/L (ref 50–136)
ALT SERPL-CCNC: 34 U/L (ref 12–65)
ANION GAP SERPL CALC-SCNC: 8 MMOL/L (ref 7–16)
AST SERPL-CCNC: 17 U/L (ref 15–37)
BASOPHILS # BLD: 0.1 K/UL (ref 0–0.2)
BASOPHILS NFR BLD: 1 % (ref 0–2)
BILIRUB SERPL-MCNC: 0.4 MG/DL (ref 0.2–1.1)
BUN SERPL-MCNC: 15 MG/DL (ref 6–23)
CALCIUM SERPL-MCNC: 9.5 MG/DL (ref 8.3–10.4)
CHLORIDE SERPL-SCNC: 105 MMOL/L (ref 98–107)
CO2 SERPL-SCNC: 24 MMOL/L (ref 21–32)
CREAT SERPL-MCNC: 1.2 MG/DL (ref 0.8–1.5)
DIFFERENTIAL METHOD BLD: ABNORMAL
EOSINOPHIL # BLD: 0 K/UL (ref 0–0.8)
EOSINOPHIL NFR BLD: 0 % (ref 0.5–7.8)
ERYTHROCYTE [DISTWIDTH] IN BLOOD BY AUTOMATED COUNT: 12.9 % (ref 11.9–14.6)
GLOBULIN SER CALC-MCNC: 4.1 G/DL (ref 2.3–3.5)
GLUCOSE SERPL-MCNC: 150 MG/DL (ref 65–100)
HCG SERPL-ACNC: <1 MIU/ML (ref 0–2)
HCT VFR BLD AUTO: 41.5 %
HGB BLD-MCNC: 14.3 G/DL (ref 13.6–17.2)
IMM GRANULOCYTES # BLD AUTO: 1.1 K/UL (ref 0–0.5)
IMM GRANULOCYTES NFR BLD AUTO: 11 % (ref 0–5)
LDH SERPL L TO P-CCNC: 246 U/L (ref 100–190)
LYMPHOCYTES # BLD: 1.4 K/UL (ref 0.5–4.6)
LYMPHOCYTES NFR BLD: 15 % (ref 13–44)
MCH RBC QN AUTO: 28.7 PG (ref 26.1–32.9)
MCHC RBC AUTO-ENTMCNC: 34.5 G/DL (ref 31.4–35)
MCV RBC AUTO: 83.3 FL (ref 79.6–97.8)
MONOCYTES # BLD: 1.1 K/UL (ref 0.1–1.3)
MONOCYTES NFR BLD: 11 % (ref 4–12)
NEUTS SEG # BLD: 5.9 K/UL (ref 1.7–8.2)
NEUTS SEG NFR BLD: 62 % (ref 43–78)
NRBC # BLD: 0 K/UL (ref 0–0.2)
PLATELET # BLD AUTO: 352 K/UL (ref 150–450)
PLATELET COMMENT: ADEQUATE
PMV BLD AUTO: 8.9 FL (ref 9.4–12.3)
POTASSIUM SERPL-SCNC: 4.1 MMOL/L (ref 3.5–5.1)
PROT SERPL-MCNC: 7.9 G/DL (ref 6.3–8.2)
RBC # BLD AUTO: 4.98 M/UL (ref 4.23–5.6)
RBC MORPH BLD: ABNORMAL
SODIUM SERPL-SCNC: 137 MMOL/L (ref 136–145)
WBC # BLD AUTO: 9.6 K/UL (ref 4.3–11.1)
WBC MORPH BLD: ABNORMAL

## 2022-08-22 PROCEDURE — 96367 TX/PROPH/DG ADDL SEQ IV INF: CPT

## 2022-08-22 PROCEDURE — 96413 CHEMO IV INFUSION 1 HR: CPT

## 2022-08-22 PROCEDURE — 96411 CHEMO IV PUSH ADDL DRUG: CPT

## 2022-08-22 PROCEDURE — 2580000003 HC RX 258: Performed by: INTERNAL MEDICINE

## 2022-08-22 PROCEDURE — 80053 COMPREHEN METABOLIC PANEL: CPT

## 2022-08-22 PROCEDURE — 99215 OFFICE O/P EST HI 40 MIN: CPT | Performed by: INTERNAL MEDICINE

## 2022-08-22 PROCEDURE — 83615 LACTATE (LD) (LDH) ENZYME: CPT

## 2022-08-22 PROCEDURE — 96375 TX/PRO/DX INJ NEW DRUG ADDON: CPT

## 2022-08-22 PROCEDURE — 6370000000 HC RX 637 (ALT 250 FOR IP): Performed by: INTERNAL MEDICINE

## 2022-08-22 PROCEDURE — 84702 CHORIONIC GONADOTROPIN TEST: CPT

## 2022-08-22 PROCEDURE — 36591 DRAW BLOOD OFF VENOUS DEVICE: CPT

## 2022-08-22 PROCEDURE — 82105 ALPHA-FETOPROTEIN SERUM: CPT

## 2022-08-22 PROCEDURE — 96417 CHEMO IV INFUS EACH ADDL SEQ: CPT

## 2022-08-22 PROCEDURE — 85025 COMPLETE CBC W/AUTO DIFF WBC: CPT

## 2022-08-22 PROCEDURE — 6360000002 HC RX W HCPCS: Performed by: INTERNAL MEDICINE

## 2022-08-22 RX ORDER — SODIUM CHLORIDE 0.9 % (FLUSH) 0.9 %
5-40 SYRINGE (ML) INJECTION PRN
Status: CANCELLED | OUTPATIENT
Start: 2022-08-24

## 2022-08-22 RX ORDER — EPINEPHRINE 1 MG/ML
0.3 INJECTION, SOLUTION, CONCENTRATE INTRAVENOUS PRN
Status: CANCELLED | OUTPATIENT
Start: 2022-08-29

## 2022-08-22 RX ORDER — FAMOTIDINE 10 MG/ML
20 INJECTION, SOLUTION INTRAVENOUS
Status: CANCELLED | OUTPATIENT
Start: 2022-08-29

## 2022-08-22 RX ORDER — HEPARIN SODIUM (PORCINE) LOCK FLUSH IV SOLN 100 UNIT/ML 100 UNIT/ML
500 SOLUTION INTRAVENOUS PRN
Status: CANCELLED | OUTPATIENT
Start: 2022-08-22

## 2022-08-22 RX ORDER — DIPHENHYDRAMINE HYDROCHLORIDE 50 MG/ML
50 INJECTION INTRAMUSCULAR; INTRAVENOUS
Status: CANCELLED | OUTPATIENT
Start: 2022-09-12

## 2022-08-22 RX ORDER — ALBUTEROL SULFATE 90 UG/1
4 AEROSOL, METERED RESPIRATORY (INHALATION) PRN
Status: CANCELLED | OUTPATIENT
Start: 2022-08-23

## 2022-08-22 RX ORDER — EPINEPHRINE 1 MG/ML
0.3 INJECTION, SOLUTION, CONCENTRATE INTRAVENOUS PRN
Status: CANCELLED | OUTPATIENT
Start: 2022-08-26

## 2022-08-22 RX ORDER — ONDANSETRON 2 MG/ML
8 INJECTION INTRAMUSCULAR; INTRAVENOUS ONCE
Status: COMPLETED | OUTPATIENT
Start: 2022-08-22 | End: 2022-08-22

## 2022-08-22 RX ORDER — DIPHENHYDRAMINE HYDROCHLORIDE 50 MG/ML
25 INJECTION INTRAMUSCULAR; INTRAVENOUS ONCE
Status: COMPLETED | OUTPATIENT
Start: 2022-08-22 | End: 2022-08-22

## 2022-08-22 RX ORDER — DIPHENHYDRAMINE HYDROCHLORIDE 50 MG/ML
25 INJECTION INTRAMUSCULAR; INTRAVENOUS ONCE
Status: CANCELLED | OUTPATIENT
Start: 2022-08-29 | End: 2022-08-29

## 2022-08-22 RX ORDER — DIPHENHYDRAMINE HYDROCHLORIDE 50 MG/ML
50 INJECTION INTRAMUSCULAR; INTRAVENOUS
Status: CANCELLED | OUTPATIENT
Start: 2022-08-22

## 2022-08-22 RX ORDER — HEPARIN SODIUM (PORCINE) LOCK FLUSH IV SOLN 100 UNIT/ML 100 UNIT/ML
500 SOLUTION INTRAVENOUS PRN
Status: CANCELLED | OUTPATIENT
Start: 2022-09-12

## 2022-08-22 RX ORDER — SODIUM CHLORIDE 9 MG/ML
INJECTION, SOLUTION INTRAVENOUS CONTINUOUS
Status: CANCELLED | OUTPATIENT
Start: 2022-08-22

## 2022-08-22 RX ORDER — DIPHENHYDRAMINE HYDROCHLORIDE 50 MG/ML
50 INJECTION INTRAMUSCULAR; INTRAVENOUS
Status: CANCELLED | OUTPATIENT
Start: 2022-08-23

## 2022-08-22 RX ORDER — MEPERIDINE HYDROCHLORIDE 50 MG/ML
12.5 INJECTION INTRAMUSCULAR; INTRAVENOUS; SUBCUTANEOUS PRN
Status: CANCELLED | OUTPATIENT
Start: 2022-08-26

## 2022-08-22 RX ORDER — MEPERIDINE HYDROCHLORIDE 50 MG/ML
12.5 INJECTION INTRAMUSCULAR; INTRAVENOUS; SUBCUTANEOUS PRN
Status: CANCELLED | OUTPATIENT
Start: 2022-08-23

## 2022-08-22 RX ORDER — ACETAMINOPHEN 325 MG/1
650 TABLET ORAL ONCE
Status: CANCELLED | OUTPATIENT
Start: 2022-08-22 | End: 2022-08-22

## 2022-08-22 RX ORDER — EPINEPHRINE 1 MG/ML
0.3 INJECTION, SOLUTION, CONCENTRATE INTRAVENOUS PRN
Status: CANCELLED | OUTPATIENT
Start: 2022-08-22

## 2022-08-22 RX ORDER — SODIUM CHLORIDE 9 MG/ML
5-40 INJECTION INTRAVENOUS PRN
Status: CANCELLED | OUTPATIENT
Start: 2022-08-22

## 2022-08-22 RX ORDER — SODIUM CHLORIDE 0.9 % (FLUSH) 0.9 %
5-40 SYRINGE (ML) INJECTION PRN
Status: CANCELLED | OUTPATIENT
Start: 2022-08-25

## 2022-08-22 RX ORDER — DIPHENHYDRAMINE HYDROCHLORIDE 50 MG/ML
50 INJECTION INTRAMUSCULAR; INTRAVENOUS
Status: CANCELLED | OUTPATIENT
Start: 2022-08-26

## 2022-08-22 RX ORDER — EPINEPHRINE 1 MG/ML
0.3 INJECTION, SOLUTION, CONCENTRATE INTRAVENOUS PRN
Status: CANCELLED | OUTPATIENT
Start: 2022-08-24

## 2022-08-22 RX ORDER — ONDANSETRON 2 MG/ML
8 INJECTION INTRAMUSCULAR; INTRAVENOUS
Status: CANCELLED | OUTPATIENT
Start: 2022-08-25

## 2022-08-22 RX ORDER — HEPARIN SODIUM (PORCINE) LOCK FLUSH IV SOLN 100 UNIT/ML 100 UNIT/ML
500 SOLUTION INTRAVENOUS PRN
Status: CANCELLED | OUTPATIENT
Start: 2022-08-29

## 2022-08-22 RX ORDER — DIPHENHYDRAMINE HYDROCHLORIDE 50 MG/ML
50 INJECTION INTRAMUSCULAR; INTRAVENOUS
Status: CANCELLED | OUTPATIENT
Start: 2022-08-24

## 2022-08-22 RX ORDER — FAMOTIDINE 10 MG/ML
20 INJECTION, SOLUTION INTRAVENOUS
Status: CANCELLED | OUTPATIENT
Start: 2022-08-26

## 2022-08-22 RX ORDER — EPINEPHRINE 1 MG/ML
0.3 INJECTION, SOLUTION, CONCENTRATE INTRAVENOUS PRN
Status: CANCELLED | OUTPATIENT
Start: 2022-08-25

## 2022-08-22 RX ORDER — ONDANSETRON 2 MG/ML
8 INJECTION INTRAMUSCULAR; INTRAVENOUS
Status: CANCELLED | OUTPATIENT
Start: 2022-08-23

## 2022-08-22 RX ORDER — FAMOTIDINE 10 MG/ML
20 INJECTION, SOLUTION INTRAVENOUS
Status: CANCELLED | OUTPATIENT
Start: 2022-08-22

## 2022-08-22 RX ORDER — SODIUM CHLORIDE 0.9 % (FLUSH) 0.9 %
5-40 SYRINGE (ML) INJECTION PRN
Status: CANCELLED | OUTPATIENT
Start: 2022-08-22

## 2022-08-22 RX ORDER — ACETAMINOPHEN 325 MG/1
650 TABLET ORAL
Status: CANCELLED | OUTPATIENT
Start: 2022-08-22

## 2022-08-22 RX ORDER — ACETAMINOPHEN 325 MG/1
650 TABLET ORAL ONCE
Status: CANCELLED | OUTPATIENT
Start: 2022-09-12 | End: 2022-09-05

## 2022-08-22 RX ORDER — SODIUM CHLORIDE 9 MG/ML
5-250 INJECTION, SOLUTION INTRAVENOUS PRN
Status: CANCELLED | OUTPATIENT
Start: 2022-08-29

## 2022-08-22 RX ORDER — SODIUM CHLORIDE 9 MG/ML
5-250 INJECTION, SOLUTION INTRAVENOUS PRN
Status: CANCELLED | OUTPATIENT
Start: 2022-08-24

## 2022-08-22 RX ORDER — ACETAMINOPHEN 325 MG/1
650 TABLET ORAL
Status: CANCELLED | OUTPATIENT
Start: 2022-08-23

## 2022-08-22 RX ORDER — SODIUM CHLORIDE 9 MG/ML
5-250 INJECTION, SOLUTION INTRAVENOUS PRN
Status: CANCELLED | OUTPATIENT
Start: 2022-08-22

## 2022-08-22 RX ORDER — MEPERIDINE HYDROCHLORIDE 50 MG/ML
12.5 INJECTION INTRAMUSCULAR; INTRAVENOUS; SUBCUTANEOUS PRN
Status: CANCELLED | OUTPATIENT
Start: 2022-08-24

## 2022-08-22 RX ORDER — DIPHENHYDRAMINE HYDROCHLORIDE 50 MG/ML
50 INJECTION INTRAMUSCULAR; INTRAVENOUS
Status: CANCELLED | OUTPATIENT
Start: 2022-08-25

## 2022-08-22 RX ORDER — ACETAMINOPHEN 325 MG/1
650 TABLET ORAL
Status: CANCELLED | OUTPATIENT
Start: 2022-09-12

## 2022-08-22 RX ORDER — ONDANSETRON 2 MG/ML
8 INJECTION INTRAMUSCULAR; INTRAVENOUS
Status: CANCELLED | OUTPATIENT
Start: 2022-08-22

## 2022-08-22 RX ORDER — SODIUM CHLORIDE 9 MG/ML
5-250 INJECTION, SOLUTION INTRAVENOUS PRN
Status: DISCONTINUED | OUTPATIENT
Start: 2022-08-22 | End: 2022-08-23 | Stop reason: HOSPADM

## 2022-08-22 RX ORDER — EPINEPHRINE 1 MG/ML
0.3 INJECTION, SOLUTION, CONCENTRATE INTRAVENOUS PRN
Status: CANCELLED | OUTPATIENT
Start: 2022-09-12

## 2022-08-22 RX ORDER — ZOLPIDEM TARTRATE 12.5 MG/1
12.5 TABLET, FILM COATED, EXTENDED RELEASE ORAL NIGHTLY PRN
Qty: 30 TABLET | Refills: 1 | Status: SHIPPED | OUTPATIENT
Start: 2022-08-22 | End: 2022-09-05

## 2022-08-22 RX ORDER — ACETAMINOPHEN 325 MG/1
650 TABLET ORAL
Status: CANCELLED | OUTPATIENT
Start: 2022-08-29

## 2022-08-22 RX ORDER — DIPHENHYDRAMINE HYDROCHLORIDE 50 MG/ML
25 INJECTION INTRAMUSCULAR; INTRAVENOUS ONCE
Status: CANCELLED | OUTPATIENT
Start: 2022-09-12 | End: 2022-09-05

## 2022-08-22 RX ORDER — SODIUM CHLORIDE 9 MG/ML
INJECTION, SOLUTION INTRAVENOUS CONTINUOUS
Status: CANCELLED | OUTPATIENT
Start: 2022-09-12

## 2022-08-22 RX ORDER — ONDANSETRON 2 MG/ML
8 INJECTION INTRAMUSCULAR; INTRAVENOUS
Status: CANCELLED | OUTPATIENT
Start: 2022-08-24

## 2022-08-22 RX ORDER — FAMOTIDINE 10 MG/ML
20 INJECTION, SOLUTION INTRAVENOUS
Status: CANCELLED | OUTPATIENT
Start: 2022-08-24

## 2022-08-22 RX ORDER — SODIUM CHLORIDE 9 MG/ML
5-250 INJECTION, SOLUTION INTRAVENOUS PRN
Status: CANCELLED | OUTPATIENT
Start: 2022-08-23

## 2022-08-22 RX ORDER — SODIUM CHLORIDE 9 MG/ML
INJECTION, SOLUTION INTRAVENOUS CONTINUOUS
Status: CANCELLED | OUTPATIENT
Start: 2022-08-23

## 2022-08-22 RX ORDER — MEPERIDINE HYDROCHLORIDE 50 MG/ML
12.5 INJECTION INTRAMUSCULAR; INTRAVENOUS; SUBCUTANEOUS PRN
Status: CANCELLED | OUTPATIENT
Start: 2022-08-25

## 2022-08-22 RX ORDER — SODIUM CHLORIDE 9 MG/ML
INJECTION, SOLUTION INTRAVENOUS CONTINUOUS
Status: CANCELLED | OUTPATIENT
Start: 2022-08-25

## 2022-08-22 RX ORDER — SODIUM CHLORIDE 9 MG/ML
5-250 INJECTION, SOLUTION INTRAVENOUS PRN
Status: CANCELLED | OUTPATIENT
Start: 2022-09-12

## 2022-08-22 RX ORDER — ONDANSETRON 2 MG/ML
8 INJECTION INTRAMUSCULAR; INTRAVENOUS ONCE
Status: CANCELLED | OUTPATIENT
Start: 2022-08-23 | End: 2022-08-23

## 2022-08-22 RX ORDER — ACETAMINOPHEN 325 MG/1
650 TABLET ORAL
Status: CANCELLED | OUTPATIENT
Start: 2022-08-25

## 2022-08-22 RX ORDER — SODIUM CHLORIDE 9 MG/ML
5-40 INJECTION INTRAVENOUS PRN
Status: CANCELLED | OUTPATIENT
Start: 2022-09-12

## 2022-08-22 RX ORDER — SODIUM CHLORIDE 9 MG/ML
5-250 INJECTION, SOLUTION INTRAVENOUS PRN
Status: CANCELLED | OUTPATIENT
Start: 2022-08-25

## 2022-08-22 RX ORDER — SODIUM CHLORIDE 9 MG/ML
5-40 INJECTION INTRAVENOUS PRN
Status: CANCELLED | OUTPATIENT
Start: 2022-08-25

## 2022-08-22 RX ORDER — SODIUM CHLORIDE 9 MG/ML
5-40 INJECTION INTRAVENOUS PRN
Status: CANCELLED | OUTPATIENT
Start: 2022-08-26

## 2022-08-22 RX ORDER — SODIUM CHLORIDE 0.9 % (FLUSH) 0.9 %
5-40 SYRINGE (ML) INJECTION PRN
Status: CANCELLED | OUTPATIENT
Start: 2022-09-12

## 2022-08-22 RX ORDER — FAMOTIDINE 10 MG/ML
20 INJECTION, SOLUTION INTRAVENOUS
Status: CANCELLED | OUTPATIENT
Start: 2022-09-12

## 2022-08-22 RX ORDER — MEPERIDINE HYDROCHLORIDE 50 MG/ML
12.5 INJECTION INTRAMUSCULAR; INTRAVENOUS; SUBCUTANEOUS PRN
Status: CANCELLED | OUTPATIENT
Start: 2022-08-22

## 2022-08-22 RX ORDER — SODIUM CHLORIDE 9 MG/ML
5-40 INJECTION INTRAVENOUS PRN
Status: CANCELLED | OUTPATIENT
Start: 2022-08-24

## 2022-08-22 RX ORDER — MEPERIDINE HYDROCHLORIDE 50 MG/ML
12.5 INJECTION INTRAMUSCULAR; INTRAVENOUS; SUBCUTANEOUS PRN
Status: CANCELLED | OUTPATIENT
Start: 2022-09-12

## 2022-08-22 RX ORDER — ONDANSETRON 2 MG/ML
8 INJECTION INTRAMUSCULAR; INTRAVENOUS
Status: CANCELLED | OUTPATIENT
Start: 2022-08-29

## 2022-08-22 RX ORDER — DIPHENHYDRAMINE HYDROCHLORIDE 50 MG/ML
50 INJECTION INTRAMUSCULAR; INTRAVENOUS
Status: CANCELLED | OUTPATIENT
Start: 2022-08-29

## 2022-08-22 RX ORDER — MEPERIDINE HYDROCHLORIDE 50 MG/ML
12.5 INJECTION INTRAMUSCULAR; INTRAVENOUS; SUBCUTANEOUS PRN
Status: CANCELLED | OUTPATIENT
Start: 2022-08-29

## 2022-08-22 RX ORDER — SODIUM CHLORIDE 9 MG/ML
5-250 INJECTION, SOLUTION INTRAVENOUS PRN
Status: CANCELLED | OUTPATIENT
Start: 2022-08-26

## 2022-08-22 RX ORDER — FAMOTIDINE 10 MG/ML
20 INJECTION, SOLUTION INTRAVENOUS
Status: CANCELLED | OUTPATIENT
Start: 2022-08-23

## 2022-08-22 RX ORDER — SODIUM CHLORIDE 9 MG/ML
5-40 INJECTION INTRAVENOUS PRN
Status: CANCELLED | OUTPATIENT
Start: 2022-08-23

## 2022-08-22 RX ORDER — SODIUM CHLORIDE 9 MG/ML
INJECTION, SOLUTION INTRAVENOUS CONTINUOUS
Status: CANCELLED | OUTPATIENT
Start: 2022-08-26

## 2022-08-22 RX ORDER — HEPARIN SODIUM (PORCINE) LOCK FLUSH IV SOLN 100 UNIT/ML 100 UNIT/ML
500 SOLUTION INTRAVENOUS PRN
Status: CANCELLED | OUTPATIENT
Start: 2022-08-23

## 2022-08-22 RX ORDER — ACETAMINOPHEN 325 MG/1
650 TABLET ORAL
Status: CANCELLED | OUTPATIENT
Start: 2022-08-26

## 2022-08-22 RX ORDER — HEPARIN SODIUM (PORCINE) LOCK FLUSH IV SOLN 100 UNIT/ML 100 UNIT/ML
500 SOLUTION INTRAVENOUS PRN
Status: CANCELLED | OUTPATIENT
Start: 2022-08-25

## 2022-08-22 RX ORDER — SODIUM CHLORIDE 0.9 % (FLUSH) 0.9 %
5-40 SYRINGE (ML) INJECTION PRN
Status: CANCELLED | OUTPATIENT
Start: 2022-08-29

## 2022-08-22 RX ORDER — ACETAMINOPHEN 325 MG/1
650 TABLET ORAL ONCE
Status: CANCELLED | OUTPATIENT
Start: 2022-08-29 | End: 2022-08-29

## 2022-08-22 RX ORDER — ALBUTEROL SULFATE 90 UG/1
4 AEROSOL, METERED RESPIRATORY (INHALATION) PRN
Status: CANCELLED | OUTPATIENT
Start: 2022-08-24

## 2022-08-22 RX ORDER — SODIUM CHLORIDE 9 MG/ML
INJECTION, SOLUTION INTRAVENOUS CONTINUOUS
Status: CANCELLED | OUTPATIENT
Start: 2022-08-29

## 2022-08-22 RX ORDER — ACETAMINOPHEN 325 MG/1
650 TABLET ORAL ONCE
Status: COMPLETED | OUTPATIENT
Start: 2022-08-22 | End: 2022-08-22

## 2022-08-22 RX ORDER — ONDANSETRON 2 MG/ML
8 INJECTION INTRAMUSCULAR; INTRAVENOUS ONCE
Status: CANCELLED | OUTPATIENT
Start: 2022-08-26 | End: 2022-08-26

## 2022-08-22 RX ORDER — ALBUTEROL SULFATE 90 UG/1
4 AEROSOL, METERED RESPIRATORY (INHALATION) PRN
Status: CANCELLED | OUTPATIENT
Start: 2022-08-29

## 2022-08-22 RX ORDER — ONDANSETRON 2 MG/ML
8 INJECTION INTRAMUSCULAR; INTRAVENOUS ONCE
Status: CANCELLED | OUTPATIENT
Start: 2022-08-25 | End: 2022-08-25

## 2022-08-22 RX ORDER — SODIUM CHLORIDE 0.9 % (FLUSH) 0.9 %
10 SYRINGE (ML) INJECTION PRN
Status: DISCONTINUED | OUTPATIENT
Start: 2022-08-22 | End: 2022-08-23 | Stop reason: HOSPADM

## 2022-08-22 RX ORDER — SODIUM CHLORIDE 0.9 % (FLUSH) 0.9 %
5-40 SYRINGE (ML) INJECTION PRN
Status: CANCELLED | OUTPATIENT
Start: 2022-08-23

## 2022-08-22 RX ORDER — SODIUM CHLORIDE 9 MG/ML
5-40 INJECTION INTRAVENOUS PRN
Status: CANCELLED | OUTPATIENT
Start: 2022-08-29

## 2022-08-22 RX ORDER — ALBUTEROL SULFATE 90 UG/1
4 AEROSOL, METERED RESPIRATORY (INHALATION) PRN
Status: CANCELLED | OUTPATIENT
Start: 2022-09-12

## 2022-08-22 RX ORDER — FAMOTIDINE 10 MG/ML
20 INJECTION, SOLUTION INTRAVENOUS
Status: CANCELLED | OUTPATIENT
Start: 2022-08-25

## 2022-08-22 RX ORDER — ALBUTEROL SULFATE 90 UG/1
4 AEROSOL, METERED RESPIRATORY (INHALATION) PRN
Status: CANCELLED | OUTPATIENT
Start: 2022-08-25

## 2022-08-22 RX ORDER — DIPHENHYDRAMINE HYDROCHLORIDE 50 MG/ML
25 INJECTION INTRAMUSCULAR; INTRAVENOUS ONCE
Status: CANCELLED | OUTPATIENT
Start: 2022-08-22 | End: 2022-08-22

## 2022-08-22 RX ORDER — SODIUM CHLORIDE 0.9 % (FLUSH) 0.9 %
5-40 SYRINGE (ML) INJECTION PRN
Status: DISCONTINUED | OUTPATIENT
Start: 2022-08-22 | End: 2022-08-23 | Stop reason: HOSPADM

## 2022-08-22 RX ORDER — HEPARIN SODIUM (PORCINE) LOCK FLUSH IV SOLN 100 UNIT/ML 100 UNIT/ML
500 SOLUTION INTRAVENOUS PRN
Status: CANCELLED | OUTPATIENT
Start: 2022-08-24

## 2022-08-22 RX ORDER — ALBUTEROL SULFATE 90 UG/1
4 AEROSOL, METERED RESPIRATORY (INHALATION) PRN
Status: CANCELLED | OUTPATIENT
Start: 2022-08-22

## 2022-08-22 RX ORDER — ONDANSETRON 2 MG/ML
8 INJECTION INTRAMUSCULAR; INTRAVENOUS
Status: CANCELLED | OUTPATIENT
Start: 2022-08-26

## 2022-08-22 RX ORDER — ALBUTEROL SULFATE 90 UG/1
4 AEROSOL, METERED RESPIRATORY (INHALATION) PRN
Status: CANCELLED | OUTPATIENT
Start: 2022-08-26

## 2022-08-22 RX ORDER — ONDANSETRON 2 MG/ML
8 INJECTION INTRAMUSCULAR; INTRAVENOUS ONCE
Status: CANCELLED | OUTPATIENT
Start: 2022-08-22 | End: 2022-08-22

## 2022-08-22 RX ORDER — ONDANSETRON 2 MG/ML
8 INJECTION INTRAMUSCULAR; INTRAVENOUS
Status: CANCELLED | OUTPATIENT
Start: 2022-09-12

## 2022-08-22 RX ORDER — SODIUM CHLORIDE 0.9 % (FLUSH) 0.9 %
5-40 SYRINGE (ML) INJECTION PRN
Status: CANCELLED | OUTPATIENT
Start: 2022-08-26

## 2022-08-22 RX ORDER — EPINEPHRINE 1 MG/ML
0.3 INJECTION, SOLUTION, CONCENTRATE INTRAVENOUS PRN
Status: CANCELLED | OUTPATIENT
Start: 2022-08-23

## 2022-08-22 RX ORDER — HEPARIN SODIUM (PORCINE) LOCK FLUSH IV SOLN 100 UNIT/ML 100 UNIT/ML
500 SOLUTION INTRAVENOUS PRN
Status: CANCELLED | OUTPATIENT
Start: 2022-08-26

## 2022-08-22 RX ORDER — ACETAMINOPHEN 325 MG/1
650 TABLET ORAL
Status: CANCELLED | OUTPATIENT
Start: 2022-08-24

## 2022-08-22 RX ORDER — SODIUM CHLORIDE 9 MG/ML
INJECTION, SOLUTION INTRAVENOUS CONTINUOUS
Status: CANCELLED | OUTPATIENT
Start: 2022-08-24

## 2022-08-22 RX ORDER — ONDANSETRON 2 MG/ML
8 INJECTION INTRAMUSCULAR; INTRAVENOUS ONCE
Status: CANCELLED | OUTPATIENT
Start: 2022-08-24 | End: 2022-08-24

## 2022-08-22 RX ADMIN — SODIUM CHLORIDE 100 ML/HR: 9 INJECTION, SOLUTION INTRAVENOUS at 10:40

## 2022-08-22 RX ADMIN — BLEOMYCIN 30 UNITS: 30 INJECTION, POWDER, LYOPHILIZED, FOR SOLUTION INTRAMUSCULAR; INTRAPLEURAL; INTRAVENOUS; SUBCUTANEOUS at 12:54

## 2022-08-22 RX ADMIN — SODIUM CHLORIDE, PRESERVATIVE FREE 10 ML: 5 INJECTION INTRAVENOUS at 10:40

## 2022-08-22 RX ADMIN — ACETAMINOPHEN 650 MG: 325 TABLET ORAL at 11:01

## 2022-08-22 RX ADMIN — SODIUM CHLORIDE, PRESERVATIVE FREE 10 ML: 5 INJECTION INTRAVENOUS at 09:21

## 2022-08-22 RX ADMIN — POTASSIUM CHLORIDE: 2 INJECTION, SOLUTION, CONCENTRATE INTRAVENOUS at 12:00

## 2022-08-22 RX ADMIN — DIPHENHYDRAMINE HYDROCHLORIDE 25 MG: 50 INJECTION, SOLUTION INTRAMUSCULAR; INTRAVENOUS at 11:02

## 2022-08-22 RX ADMIN — POTASSIUM CHLORIDE: 2 INJECTION, SOLUTION, CONCENTRATE INTRAVENOUS at 14:48

## 2022-08-22 RX ADMIN — DEXAMETHASONE SODIUM PHOSPHATE 12 MG: 4 INJECTION, SOLUTION INTRAMUSCULAR; INTRAVENOUS at 11:42

## 2022-08-22 RX ADMIN — CISPLATIN 49 MG: 1 INJECTION, SOLUTION INTRAVENOUS at 14:25

## 2022-08-22 RX ADMIN — ETOPOSIDE 246 MG: 20 INJECTION INTRAVENOUS at 13:10

## 2022-08-22 RX ADMIN — FOSAPREPITANT 150 MG: 150 INJECTION, POWDER, LYOPHILIZED, FOR SOLUTION INTRAVENOUS at 11:06

## 2022-08-22 RX ADMIN — SODIUM CHLORIDE, PRESERVATIVE FREE 10 ML: 5 INJECTION INTRAVENOUS at 15:50

## 2022-08-22 RX ADMIN — ONDANSETRON 8 MG: 2 INJECTION INTRAMUSCULAR; INTRAVENOUS at 11:04

## 2022-08-22 ASSESSMENT — PATIENT HEALTH QUESTIONNAIRE - PHQ9
SUM OF ALL RESPONSES TO PHQ QUESTIONS 1-9: 0
SUM OF ALL RESPONSES TO PHQ QUESTIONS 1-9: 0
1. LITTLE INTEREST OR PLEASURE IN DOING THINGS: 0
SUM OF ALL RESPONSES TO PHQ QUESTIONS 1-9: 0
2. FEELING DOWN, DEPRESSED OR HOPELESS: 0
SUM OF ALL RESPONSES TO PHQ9 QUESTIONS 1 & 2: 0
SUM OF ALL RESPONSES TO PHQ QUESTIONS 1-9: 0

## 2022-08-22 NOTE — PROGRESS NOTES
[facility] Hematology & Oncology: Office Visit Progress Note    Chief Complaint:    Testicular cancer    History of Present Illness:  38 y.o. male past medical history of hypertension, hyperlipidemia, diabetes, presented to the emergency room with testicle swelling and lower back and abdominal pain. Reported low back pain intermittently over the past month, did not have work-up yet. Now he noticed testicular swelling with pain and presented to the emergency room for further evaluation. Scrotal ultrasound showed large left side testicular mass highly concerning for testicular malignancy measuring 4.4 x 3.7 x 3.9 cm. CT showed massive retroperitoneal lymph nodes conglomerates and a largest retrocrural lymph nodes suspicious for lymphoma versus metastatic disease. There is severe luminal narrowing of the IVC given the mass-effect upon the IVC and mass-effect upon the abdominal aorta as well as the iliopsoas musculature. Oncology is consulted for recommendation. 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 Review of Systems:  Constitutional Anorexia, fatigue. Weight loss. Denies fever or chills. HEENT Denies trauma, bluring vision, hearing loss, ear pain, nosebleeds, sore throat, neck pain and ear discharge. Skin Denies lesions or rashes. Lungs Denies shortness of breath, cough, sputum production or hemoptysis. Cardiovascular Denies, palpitations, orthopnea, claudication and leg swelling. Gastrointestinal Denies nausea, vomiting, bowel changes. Denies bloody or black stools. Denies abdominal pain.  Status post left orchiectomy. Denies dysuria, frequency or hesitancy of urination   Neuro Denies headaches, visual changes or ataxia. Denies dizziness, tingling, tremors, sensory change, speech change, focal weakness and headaches.      Hematology Denies nasal/gum bleeding, denies easy bruise   Endo Denies heat/cold intolerance, denies diabetes. MSK Back pain. Denies swollen legs, myalgias and falls. Psychiatric/Behavioral Denies depression and substance abuse. The patient is not nervous/anxious. No Known Allergies  Past Medical History:   Diagnosis Date    Benign essential hypertension     controlled with med    Diabetes mellitus type 2, noninsulin dependent ([address]     oral reliant; AVG -105; pt does have s.s of hypoglycemia but does not know what BS is when it gets low; last A1C    Fatty liver     per pt    Hyperlipidemia     managed with med    Testicular mass     Left     Past Surgical History:   Procedure Laterality Date    APPENDECTOMY      IR PORT PLACEMENT EQUAL OR GREATER THAN 5 YEARS  7/22/2022    IR PORT PLACEMENT EQUAL OR GREATER THAN 5 YEARS 7/22/2022 [facility] SPECIALS    KIDNEY STONE SURGERY Right 2011    TESTICLE REMOVAL Left 7/7/2022    LEFT RADICAL ORCHIECTOMY Julio C Merle APPROACH performed by Jessi Lira MD at [facility] MAIN OR     History reviewed. No pertinent family history.   Social History     Socioeconomic History    Marital status:      Spouse name: Not on file    Number of children: Not on file    Years of education: Not on file    Highest education level: Not on file   Occupational History    Not on file   Tobacco Use    Smoking status: Never    Smokeless tobacco: Never   Vaping Use    Vaping Use: Never used   Substance and Sexual Activity    Alcohol use: Yes     Comment: one or two per week    Drug use: Never    Sexual activity: Not on file   Other Topics Concern    Not on file   Social History Narrative    Not on file     Social Determinants of Health     Financial Resource Strain: Not on file   Food Insecurity: Not on file   Transportation Needs: Not on file   Physical Activity: Not on file   Stress: Not on file   Social Connections: Not on file   Intimate Partner Violence: Not on file   Housing Stability: Not on file     Current Outpatient Medications Medication Sig Dispense Refill    chlorhexidine (PERIDEX) 0.12 % solution Take 15 mLs by mouth in the morning and 15 mLs before bedtime. Do all this for 14 days. 420 mL 0    HYDROcodone-acetaminophen (NORCO) 7.5-325 MG per tablet PLEASE SEE ATTACHED FOR DETAILED DIRECTIONS      lidocaine-prilocaine (EMLA) 2.5-2.5 % cream Apply topically as needed. 1 each 2    ondansetron (ZOFRAN-ODT) 8 MG TBDP disintegrating tablet Place 1 tablet under the tongue every 8 hours as needed for Nausea or Vomiting 90 tablet 2    sennosides-docusate sodium (SENOKOT-S) 8.6-50 MG tablet Take 1 tablet by mouth daily as needed for Constipation 30 tablet 0    telmisartan-amLODIPine (TWYNSTA) 80-5 MG TABS tablet Take 1 tablet by mouth daily      atenolol (TENORMIN) 50 MG tablet Take 50 mg by mouth daily      atorvastatin (LIPITOR) 20 MG tablet Take 20 mg by mouth nightly      metFORMIN (GLUCOPHAGE-XR) 500 MG extended release tablet Take 2 with breakfast and supper - Diabetes      spironolactone (ALDACTONE) 50 MG tablet Take 50 mg by mouth daily      prochlorperazine (COMPAZINE) 10 MG tablet Take 1 tablet by mouth every 6 hours as needed (cinv) 90 tablet 2     No current facility-administered medications for this visit. OBJECTIVE:  /79 (Site: Left Upper Arm, Position: Standing, Cuff Size: Large Adult)   Pulse 77   Temp 98.7 °F (37.1 °C) (Oral)   Resp 16   Ht 6' 1\" (1.854 m)   Wt 238 lb 9.6 oz (108.2 kg)   SpO2 97%   BMI 31.48 kg/m²     Physical Exam:  Constitutional: Oriented to person, place, and time. Well-developed and well-nourished. HEENT: Normocephalic and atraumatic. Oropharynx is clear and moist.   Conjunctivae and EOM are normal. Pupils are equal, round, and reactive to light. No scleral icterus. Neck supple. No JVD present. No tracheal deviation present. No thyromegaly present. Lymph node No palpable submandibular, cervical, supraclavicular, axillary and inguinal lymph nodes. Skin Warm and dry.   No bruising and no rash noted. No erythema. No pallor. Respiratory Effort normal and breath sounds normal.  No respiratory distress. No wheezes. No rales. No tenderness. CVS Normal rate, regular rhythm and normal heart sounds. Exam reveals no gallop, no friction and no rub. No murmur heard. Abdomen Status post left orchiectomy. Right testis soft nontender no mass. Soft. Bowel sounds are normal. Exhibits no distension. There is no tenderness. There is no rebound and no guarding. Neuro Normal reflexes. No cranial nerve deficit. Exhibits normal muscle tone, 5 of 5 strength of all extremities. MSK Normal range of motion in general.  No edema and no tenderness. Psych Normal mood, affect, behavior, judgment and thought content      Labs:  Recent Results (from the past 24 hour(s))   CBC with Auto Differential    Collection Time: 08/22/22  9:21 AM   Result Value Ref Range    WBC 9.6 4.3 - 11.1 K/uL    RBC 4.98 4.23 - 5.6 M/uL    Hemoglobin 14.3 13.6 - 17.2 g/dL    Hematocrit 41.5 %    MCV 83.3 79.6 - 97.8 FL    MCH 28.7 26.1 - 32.9 PG    MCHC 34.5 31.4 - 35.0 g/dL    RDW 12.9 11.9 - 14.6 %    Platelets 032 778 - 837 K/uL    MPV 8.9 (L) 9.4 - 12.3 FL    nRBC 0.00 0.0 - 0.2 K/uL    Differential Type PENDING          Imaging:  No results found for this or any previous visit. ASSESSMENT/PLAN:   Diagnosis Orders   1. Seminoma ([address]  CBC with Auto Differential    Comprehensive Metabolic Panel      2. High risk medication use        3.  Insomnia, unspecified type                  38 y.o. 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, he need tumor markers for further diagnosis, patient and wife are tearful and discussed that this was a potentially rather curable cancer, typically would pursue inguinal radical orchiectomy for diagnosis and local disease control, nonetheless he is a typical case radiographically if beta-hCG is highly elevated, and the severe narrowing of IVC is of clinical concern of complication unless chemotherapy is started soon, discussed with uroncology and may start chemo urgently if needed, discussed options with patient and he agrees with starting chemotherapy as soon as possible to avoid further complication, elevated beta-hCG and normal AFP consistent with seminoma, completed sperm banking and received a left radical inguinal orchiectomy on 7/7/2022 and the pathology showed pure seminoma, followed in office on 7/19/2022 and we discussed the result, beta-hCG and LDH increasing rapidly despite of the resection, increased back pain and night sweat, placed port and start BEP x3 7/25/2022, return on 8/1/2022, hCG down to 82, however not feeling well and lost 14 pounds in the week, not short of breath but reports some atypical chest pain that lasts for seconds each time, tachycardic and blood pressure lower than baseline, stat CT chest to rule out PE, arrange IV fluid for dehydration and received a day 8 bleomycin, return 8/8/2022, chest pain and short of breath resolved, but having mucositis with difficulty eating, prescribed Peridex and Magic mouthwash, discussed ways to improve nutrition and hydration, return 8/22/2022, labs reviewed and proceed to cycle 2-day 1, antiemetics as needed, again addressed all questions and beta-hCG trends down, start extended release Ambien for insomnia/anxiety, return next week but call as needed. Baseline PFT WNL. All questions are answered to their satisfaction. They will call for further questions and concerns.     ECOG PERFORMANCE STATUS - 0-Fully active, able to carry on all pre-disease performance without restriction. Pain - 3/10. Mild to moderate pain, requiring medication - see MAR     Fatigue - No flowsheet data found. Distress - No flowsheet data found. Total time independently spent on today's visit was 40min. This time included: face-to-face time evaluating the patient as well as additional non-face-to-face time spent on: Preparing to see the patient by obtaining and reviewing previous test results, records and medical history, Performing a medically appropriate history and exam and documenting relevant clinical information for this visit, Counseling and educating patient and family, Ordering medications, Communicating with other health care professionals and Referring patient to another health care provider. Elements of this note have been dictated via voice recognition software. Text and phrases may be limited by the accuracy and autoconversion of the software. The chart has been reviewed, but errors may still be present. Rosa Shay M.D.   [facility]  [address] [facility] [place], [address] [place] [address]  Office : [phone]  Fax : [phone]

## 2022-08-22 NOTE — PROGRESS NOTES
8/22/22 saw pt today with Dr. Devan Le for pre chemo c2d1 BEP. He is tolerating chemo well. PO intake is good. Stated he is not able to sleep through the night, ambien CR sent to pharmacy. Proceed to infusion. Follow up in 1 week. Encouraged to call with any concerns. Navigation will continue to follow.

## 2022-08-22 NOTE — PROGRESS NOTES
Arrived to the [facility]. Bleomycin, Etoposide, Cisplatin completed. Patient tolerated well. Any issues or concerns during appointment: none. Patient aware of next infusion appointment on 8/23 at 8:30 am.  Patient instructed to call provider with temperature of 100.4 or greater or nausea/vomiting/ diarrhea or pain not controlled by medications  Discharged ambulatory to home with port needle flushed and left in place.

## 2022-08-22 NOTE — PLAN OF CARE
Problem: Chronic Conditions and Co-morbidities  Goal: Patient's chronic conditions and co-morbidity symptoms are monitored and maintained or improved  8/22/2022 1035 by Army Doroteo RN  Outcome: Progressing  8/22/2022 1035 by Army Sadler RN  Outcome: Progressing

## 2022-08-22 NOTE — PROGRESS NOTES
Patient arrived to port lab for port access and lab draw   [address] accessed and labs drawn per protocol   *Port remains accessed for infusion   Patient discharged from port lab ambulatory*

## 2022-08-23 ENCOUNTER — HOSPITAL ENCOUNTER (OUTPATIENT)
Dept: INFUSION THERAPY | Age: 42
Discharge: HOME | End: 2022-08-23
Payer: COMMERCIAL

## 2022-08-23 VITALS
OXYGEN SATURATION: 97 % | SYSTOLIC BLOOD PRESSURE: 129 MMHG | WEIGHT: 243 LBS | HEART RATE: 94 BPM | DIASTOLIC BLOOD PRESSURE: 74 MMHG | TEMPERATURE: 97.9 F | RESPIRATION RATE: 16 BRPM | BODY MASS INDEX: 32.06 KG/M2

## 2022-08-23 DIAGNOSIS — C62.90 SEMINOMA (HCC): Primary | ICD-10-CM

## 2022-08-23 PROCEDURE — 96367 TX/PROPH/DG ADDL SEQ IV INF: CPT

## 2022-08-23 PROCEDURE — 2580000003 HC RX 258: Performed by: INTERNAL MEDICINE

## 2022-08-23 PROCEDURE — 6360000002 HC RX W HCPCS: Performed by: INTERNAL MEDICINE

## 2022-08-23 PROCEDURE — 96366 THER/PROPH/DIAG IV INF ADDON: CPT

## 2022-08-23 PROCEDURE — 96375 TX/PRO/DX INJ NEW DRUG ADDON: CPT

## 2022-08-23 PROCEDURE — 96417 CHEMO IV INFUS EACH ADDL SEQ: CPT

## 2022-08-23 PROCEDURE — 96413 CHEMO IV INFUSION 1 HR: CPT

## 2022-08-23 RX ORDER — ONDANSETRON 2 MG/ML
8 INJECTION INTRAMUSCULAR; INTRAVENOUS ONCE
Status: COMPLETED | OUTPATIENT
Start: 2022-08-23 | End: 2022-08-23

## 2022-08-23 RX ORDER — SODIUM CHLORIDE 9 MG/ML
5-250 INJECTION, SOLUTION INTRAVENOUS PRN
Status: DISCONTINUED | OUTPATIENT
Start: 2022-08-23 | End: 2022-08-24 | Stop reason: HOSPADM

## 2022-08-23 RX ORDER — SODIUM CHLORIDE 0.9 % (FLUSH) 0.9 %
5-40 SYRINGE (ML) INJECTION PRN
Status: DISCONTINUED | OUTPATIENT
Start: 2022-08-23 | End: 2022-08-24 | Stop reason: HOSPADM

## 2022-08-23 RX ADMIN — CISPLATIN 49 MG: 1 INJECTION, SOLUTION INTRAVENOUS at 11:59

## 2022-08-23 RX ADMIN — ETOPOSIDE 246 MG: 20 INJECTION INTRAVENOUS at 10:48

## 2022-08-23 RX ADMIN — DEXAMETHASONE SODIUM PHOSPHATE 12 MG: 4 INJECTION, SOLUTION INTRAMUSCULAR; INTRAVENOUS at 10:16

## 2022-08-23 RX ADMIN — SODIUM CHLORIDE, PRESERVATIVE FREE 10 ML: 5 INJECTION INTRAVENOUS at 08:54

## 2022-08-23 RX ADMIN — POTASSIUM CHLORIDE: 2 INJECTION, SOLUTION, CONCENTRATE INTRAVENOUS at 09:12

## 2022-08-23 RX ADMIN — POTASSIUM CHLORIDE: 2 INJECTION, SOLUTION, CONCENTRATE INTRAVENOUS at 13:01

## 2022-08-23 RX ADMIN — ONDANSETRON 8 MG: 2 INJECTION INTRAMUSCULAR; INTRAVENOUS at 10:16

## 2022-08-23 NOTE — PROGRESS NOTES
Arrived to the [facility]. Cisplatin/VP16 infusion completed. Patient tolerated well. Any issues or concerns during appointment: none. Patient aware of next infusion appointment on 08/24/2022 (date) at 0800 (time). Discharged ambulatory.

## 2022-08-24 ENCOUNTER — HOSPITAL ENCOUNTER (OUTPATIENT)
Dept: INFUSION THERAPY | Age: 42
Discharge: HOME | End: 2022-08-24
Payer: COMMERCIAL

## 2022-08-24 VITALS
HEART RATE: 74 BPM | BODY MASS INDEX: 31.93 KG/M2 | OXYGEN SATURATION: 97 % | TEMPERATURE: 97.4 F | RESPIRATION RATE: 16 BRPM | WEIGHT: 242 LBS | DIASTOLIC BLOOD PRESSURE: 83 MMHG | SYSTOLIC BLOOD PRESSURE: 117 MMHG

## 2022-08-24 DIAGNOSIS — C62.90 SEMINOMA (HCC): Primary | ICD-10-CM

## 2022-08-24 PROCEDURE — 6360000002 HC RX W HCPCS: Performed by: INTERNAL MEDICINE

## 2022-08-24 PROCEDURE — 96366 THER/PROPH/DIAG IV INF ADDON: CPT

## 2022-08-24 PROCEDURE — 96413 CHEMO IV INFUSION 1 HR: CPT

## 2022-08-24 PROCEDURE — 96375 TX/PRO/DX INJ NEW DRUG ADDON: CPT

## 2022-08-24 PROCEDURE — 96367 TX/PROPH/DG ADDL SEQ IV INF: CPT

## 2022-08-24 PROCEDURE — 2580000003 HC RX 258: Performed by: INTERNAL MEDICINE

## 2022-08-24 PROCEDURE — 96417 CHEMO IV INFUS EACH ADDL SEQ: CPT

## 2022-08-24 RX ORDER — SODIUM CHLORIDE 9 MG/ML
5-250 INJECTION, SOLUTION INTRAVENOUS PRN
Status: DISCONTINUED | OUTPATIENT
Start: 2022-08-24 | End: 2022-08-25 | Stop reason: HOSPADM

## 2022-08-24 RX ORDER — SODIUM CHLORIDE 0.9 % (FLUSH) 0.9 %
5-40 SYRINGE (ML) INJECTION PRN
Status: DISCONTINUED | OUTPATIENT
Start: 2022-08-24 | End: 2022-08-25 | Stop reason: HOSPADM

## 2022-08-24 RX ORDER — ONDANSETRON 2 MG/ML
8 INJECTION INTRAMUSCULAR; INTRAVENOUS ONCE
Status: COMPLETED | OUTPATIENT
Start: 2022-08-24 | End: 2022-08-24

## 2022-08-24 RX ADMIN — SODIUM CHLORIDE, PRESERVATIVE FREE 10 ML: 5 INJECTION INTRAVENOUS at 08:30

## 2022-08-24 RX ADMIN — ETOPOSIDE 246 MG: 20 INJECTION INTRAVENOUS at 10:55

## 2022-08-24 RX ADMIN — DEXAMETHASONE SODIUM PHOSPHATE 12 MG: 4 INJECTION, SOLUTION INTRAMUSCULAR; INTRAVENOUS at 10:04

## 2022-08-24 RX ADMIN — POTASSIUM CHLORIDE: 2 INJECTION, SOLUTION, CONCENTRATE INTRAVENOUS at 08:58

## 2022-08-24 RX ADMIN — CISPLATIN 49 MG: 1 INJECTION, SOLUTION INTRAVENOUS at 12:19

## 2022-08-24 RX ADMIN — ONDANSETRON 8 MG: 2 INJECTION INTRAMUSCULAR; INTRAVENOUS at 10:01

## 2022-08-24 RX ADMIN — POTASSIUM CHLORIDE: 2 INJECTION, SOLUTION, CONCENTRATE INTRAVENOUS at 13:21

## 2022-08-24 NOTE — PROGRESS NOTES
Arrived to the [facility]. Cisplatin/Etoposide completed. Patient tolerated well. Any issues or concerns during appointment: none. Patient aware of next infusion appointment on 8/25/22 (date) at 8:00 am (time). Patient instructed to call provider with temperature of 100.4 or greater or nausea/vomiting/ diarrhea or pain not controlled by medications  Discharged ambulatory.

## 2022-08-25 ENCOUNTER — CLINICAL DOCUMENTATION (OUTPATIENT)
Dept: ONCOLOGY | Age: 42
End: 2022-08-25

## 2022-08-25 ENCOUNTER — HOSPITAL ENCOUNTER (OUTPATIENT)
Dept: INFUSION THERAPY | Age: 42
Discharge: HOME | End: 2022-08-25
Payer: COMMERCIAL

## 2022-08-25 VITALS
HEART RATE: 77 BPM | WEIGHT: 243.8 LBS | TEMPERATURE: 97.7 F | OXYGEN SATURATION: 97 % | RESPIRATION RATE: 16 BRPM | SYSTOLIC BLOOD PRESSURE: 112 MMHG | DIASTOLIC BLOOD PRESSURE: 69 MMHG | BODY MASS INDEX: 32.17 KG/M2

## 2022-08-25 DIAGNOSIS — C62.90 SEMINOMA (HCC): Primary | ICD-10-CM

## 2022-08-25 PROCEDURE — 96366 THER/PROPH/DIAG IV INF ADDON: CPT

## 2022-08-25 PROCEDURE — 96413 CHEMO IV INFUSION 1 HR: CPT

## 2022-08-25 PROCEDURE — 96417 CHEMO IV INFUS EACH ADDL SEQ: CPT

## 2022-08-25 PROCEDURE — 96375 TX/PRO/DX INJ NEW DRUG ADDON: CPT

## 2022-08-25 PROCEDURE — 96367 TX/PROPH/DG ADDL SEQ IV INF: CPT

## 2022-08-25 PROCEDURE — 6360000002 HC RX W HCPCS: Performed by: INTERNAL MEDICINE

## 2022-08-25 PROCEDURE — 2580000003 HC RX 258: Performed by: INTERNAL MEDICINE

## 2022-08-25 RX ORDER — SODIUM CHLORIDE 0.9 % (FLUSH) 0.9 %
5-40 SYRINGE (ML) INJECTION PRN
Status: DISCONTINUED | OUTPATIENT
Start: 2022-08-25 | End: 2022-08-26 | Stop reason: HOSPADM

## 2022-08-25 RX ORDER — ONDANSETRON 2 MG/ML
8 INJECTION INTRAMUSCULAR; INTRAVENOUS ONCE
Status: COMPLETED | OUTPATIENT
Start: 2022-08-25 | End: 2022-08-25

## 2022-08-25 RX ORDER — SODIUM CHLORIDE 9 MG/ML
5-250 INJECTION, SOLUTION INTRAVENOUS PRN
Status: DISCONTINUED | OUTPATIENT
Start: 2022-08-25 | End: 2022-08-26 | Stop reason: HOSPADM

## 2022-08-25 RX ADMIN — CISPLATIN 49 MG: 1 INJECTION, SOLUTION INTRAVENOUS at 12:15

## 2022-08-25 RX ADMIN — POTASSIUM CHLORIDE: 2 INJECTION, SOLUTION, CONCENTRATE INTRAVENOUS at 09:00

## 2022-08-25 RX ADMIN — DEXAMETHASONE SODIUM PHOSPHATE 12 MG: 4 INJECTION, SOLUTION INTRAMUSCULAR; INTRAVENOUS at 10:10

## 2022-08-25 RX ADMIN — SODIUM CHLORIDE, PRESERVATIVE FREE 10 ML: 5 INJECTION INTRAVENOUS at 14:20

## 2022-08-25 RX ADMIN — POTASSIUM CHLORIDE: 2 INJECTION, SOLUTION, CONCENTRATE INTRAVENOUS at 13:18

## 2022-08-25 RX ADMIN — ETOPOSIDE 246 MG: 20 INJECTION INTRAVENOUS at 10:56

## 2022-08-25 RX ADMIN — SODIUM CHLORIDE 25 ML/HR: 9 INJECTION, SOLUTION INTRAVENOUS at 08:25

## 2022-08-25 RX ADMIN — FOSAPREPITANT 150 MG: 150 INJECTION, POWDER, LYOPHILIZED, FOR SOLUTION INTRAVENOUS at 10:28

## 2022-08-25 RX ADMIN — ONDANSETRON 8 MG: 2 INJECTION INTRAMUSCULAR; INTRAVENOUS at 10:07

## 2022-08-25 NOTE — PROGRESS NOTES
Arrived to the infusion center. Assessment done. Only complains of fatigue. Etoposide / Cisplatin completed without signs of adverse reaction. Aware of his next appointment on 8/26 at 0800. Discharged ambulatory in satisfactory condition.

## 2022-08-25 NOTE — PLAN OF CARE
Problem: Chronic Conditions and Co-morbidities  Goal: Patient's chronic conditions and co-morbidity symptoms are monitored and maintained or improved  8/25/2022 1353 by Alondra Oviedo RN  Outcome: Progressing  8/25/2022 0852 by Alondra Oviedo, RN  Outcome: Progressing

## 2022-08-25 NOTE — PROGRESS NOTES
Clinical Social Work Note  Name: Angel Tompkins    : 1980    MRN: [id number]    Date of Service: 2022    Type of Service: Health and Behavior Intervention     Length of Service: 16 minutes    Patient Diagnosis: No diagnosis found. Referral Source: Infusion RN    Reason for Visit: FU    Subject: Seen patient with his wife, couple has no children. Patient's mother went back to her home in [facility].  JURGEN-ANGELES reviewed Distress by using NCCN Guidelines for Patients' Distress. Gave patient information on TIP. Mini Mental Status Exam: Angel Tompkins was dressed properly. No abnormal psychomotor movements observed. Intellectual functioning appeared to be intact. Insight was adequate. Judgment was adequate. Patient did not report suicidal ideations, intent or plans. Speech was coherent. Thought process was clear. Patient did not report homicidal ideations, intent or plans. Patient was oriented to self, place, time and situation. Protective Factors: Current care for physical and mental illness, adequate insight and judgment, family support, cultural and Sikhism beliefs and values that support self-care. Next Steps: IVORY gave contact information and encouraged pt to call should any needs arise. Pt verbalized understanding. JURGEN-ANGELES intends to follow up as needed. No flowsheet data found.         Electronically Signed By:  JURGEN Shaikh

## 2022-08-26 ENCOUNTER — HOSPITAL ENCOUNTER (OUTPATIENT)
Dept: INFUSION THERAPY | Age: 42
Discharge: HOME | End: 2022-08-26
Payer: COMMERCIAL

## 2022-08-26 VITALS
OXYGEN SATURATION: 98 % | RESPIRATION RATE: 16 BRPM | WEIGHT: 242.6 LBS | TEMPERATURE: 97.5 F | HEART RATE: 101 BPM | DIASTOLIC BLOOD PRESSURE: 81 MMHG | SYSTOLIC BLOOD PRESSURE: 116 MMHG | BODY MASS INDEX: 32.01 KG/M2

## 2022-08-26 DIAGNOSIS — C62.90 SEMINOMA (HCC): Primary | ICD-10-CM

## 2022-08-26 PROCEDURE — 96375 TX/PRO/DX INJ NEW DRUG ADDON: CPT

## 2022-08-26 PROCEDURE — 96417 CHEMO IV INFUS EACH ADDL SEQ: CPT

## 2022-08-26 PROCEDURE — 6360000002 HC RX W HCPCS: Performed by: INTERNAL MEDICINE

## 2022-08-26 PROCEDURE — 96367 TX/PROPH/DG ADDL SEQ IV INF: CPT

## 2022-08-26 PROCEDURE — 96413 CHEMO IV INFUSION 1 HR: CPT

## 2022-08-26 PROCEDURE — 2580000003 HC RX 258: Performed by: INTERNAL MEDICINE

## 2022-08-26 PROCEDURE — 96366 THER/PROPH/DIAG IV INF ADDON: CPT

## 2022-08-26 RX ORDER — SODIUM CHLORIDE 0.9 % (FLUSH) 0.9 %
5-40 SYRINGE (ML) INJECTION PRN
Status: DISCONTINUED | OUTPATIENT
Start: 2022-08-26 | End: 2022-08-27 | Stop reason: HOSPADM

## 2022-08-26 RX ORDER — SODIUM CHLORIDE 9 MG/ML
5-250 INJECTION, SOLUTION INTRAVENOUS PRN
Status: DISCONTINUED | OUTPATIENT
Start: 2022-08-26 | End: 2022-08-27 | Stop reason: HOSPADM

## 2022-08-26 RX ORDER — ONDANSETRON 2 MG/ML
8 INJECTION INTRAMUSCULAR; INTRAVENOUS ONCE
Status: COMPLETED | OUTPATIENT
Start: 2022-08-26 | End: 2022-08-26

## 2022-08-26 RX ADMIN — POTASSIUM CHLORIDE: 2 INJECTION, SOLUTION, CONCENTRATE INTRAVENOUS at 09:20

## 2022-08-26 RX ADMIN — CISPLATIN 49 MG: 1 INJECTION, SOLUTION INTRAVENOUS at 11:38

## 2022-08-26 RX ADMIN — DEXAMETHASONE SODIUM PHOSPHATE 12 MG: 4 INJECTION, SOLUTION INTRAMUSCULAR; INTRAVENOUS at 09:00

## 2022-08-26 RX ADMIN — POTASSIUM CHLORIDE: 2 INJECTION, SOLUTION, CONCENTRATE INTRAVENOUS at 12:43

## 2022-08-26 RX ADMIN — SODIUM CHLORIDE, PRESERVATIVE FREE 10 ML: 5 INJECTION INTRAVENOUS at 08:25

## 2022-08-26 RX ADMIN — SODIUM CHLORIDE 250 ML/HR: 9 INJECTION, SOLUTION INTRAVENOUS at 08:25

## 2022-08-26 RX ADMIN — ETOPOSIDE 246 MG: 20 INJECTION INTRAVENOUS at 10:25

## 2022-08-26 RX ADMIN — ONDANSETRON 8 MG: 2 INJECTION INTRAMUSCULAR; INTRAVENOUS at 08:58

## 2022-08-26 NOTE — PROGRESS NOTES
Arrived to the [facility]. Cisplatin/Etoposide completed. Patient tolerated well. Post hydration infusing. Any issues or concerns during appointment: None. Report given to Marshall Jackman RN  Patient aware of next infusion appointment on August 29th at 1330.   Patient instructed to call provider with temperature of 100.4 or greater or nausea/vomiting/ diarrhea or pain not controlled by medications

## 2022-08-29 ENCOUNTER — HOSPITAL ENCOUNTER (OUTPATIENT)
Dept: INFUSION THERAPY | Age: 42
Discharge: HOME | End: 2022-08-29
Payer: COMMERCIAL

## 2022-08-29 ENCOUNTER — CLINICAL DOCUMENTATION (OUTPATIENT)
Dept: CASE MANAGEMENT | Age: 42
End: 2022-08-29

## 2022-08-29 ENCOUNTER — OFFICE VISIT (OUTPATIENT)
Dept: ONCOLOGY | Age: 42
End: 2022-08-29
Payer: COMMERCIAL

## 2022-08-29 VITALS
TEMPERATURE: 98.5 F | HEIGHT: 73 IN | RESPIRATION RATE: 16 BRPM | OXYGEN SATURATION: 97 % | HEART RATE: 127 BPM | DIASTOLIC BLOOD PRESSURE: 75 MMHG | BODY MASS INDEX: 30.88 KG/M2 | WEIGHT: 233 LBS | SYSTOLIC BLOOD PRESSURE: 109 MMHG

## 2022-08-29 VITALS — HEART RATE: 110 BPM

## 2022-08-29 DIAGNOSIS — C62.90 SEMINOMA (HCC): Primary | ICD-10-CM

## 2022-08-29 DIAGNOSIS — C62.90 SEMINOMA (HCC): ICD-10-CM

## 2022-08-29 DIAGNOSIS — R63.4 WEIGHT LOSS: ICD-10-CM

## 2022-08-29 DIAGNOSIS — Z79.899 HIGH RISK MEDICATION USE: ICD-10-CM

## 2022-08-29 LAB
ALBUMIN SERPL-MCNC: 3.4 G/DL (ref 3.5–5)
ALBUMIN/GLOB SERPL: 0.9 {RATIO} (ref 1.2–3.5)
ALP SERPL-CCNC: 88 U/L (ref 50–136)
ALT SERPL-CCNC: 33 U/L (ref 12–65)
ANION GAP SERPL CALC-SCNC: 9 MMOL/L (ref 7–16)
AST SERPL-CCNC: 14 U/L (ref 15–37)
BASOPHILS # BLD: 0 K/UL (ref 0–0.2)
BASOPHILS NFR BLD: 0 % (ref 0–2)
BILIRUB SERPL-MCNC: 0.7 MG/DL (ref 0.2–1.1)
BUN SERPL-MCNC: 27 MG/DL (ref 6–23)
CALCIUM SERPL-MCNC: 8.5 MG/DL (ref 8.3–10.4)
CHLORIDE SERPL-SCNC: 102 MMOL/L (ref 98–107)
CO2 SERPL-SCNC: 24 MMOL/L (ref 21–32)
CREAT SERPL-MCNC: 1.2 MG/DL (ref 0.8–1.5)
DIFFERENTIAL METHOD BLD: ABNORMAL
EOSINOPHIL # BLD: 0 K/UL (ref 0–0.8)
EOSINOPHIL NFR BLD: 0 % (ref 0.5–7.8)
ERYTHROCYTE [DISTWIDTH] IN BLOOD BY AUTOMATED COUNT: 12.7 % (ref 11.9–14.6)
GLOBULIN SER CALC-MCNC: 3.8 G/DL (ref 2.3–3.5)
GLUCOSE SERPL-MCNC: 223 MG/DL (ref 65–100)
HCT VFR BLD AUTO: 41.6 %
HGB BLD-MCNC: 14.8 G/DL (ref 13.6–17.2)
IMM GRANULOCYTES # BLD AUTO: 0.1 K/UL (ref 0–0.5)
IMM GRANULOCYTES NFR BLD AUTO: 1 % (ref 0–5)
LYMPHOCYTES # BLD: 0.9 K/UL (ref 0.5–4.6)
LYMPHOCYTES NFR BLD: 16 % (ref 13–44)
MCH RBC QN AUTO: 28.7 PG (ref 26.1–32.9)
MCHC RBC AUTO-ENTMCNC: 35.6 G/DL (ref 31.4–35)
MCV RBC AUTO: 80.6 FL (ref 79.6–97.8)
MONOCYTES # BLD: 0 K/UL (ref 0.1–1.3)
MONOCYTES NFR BLD: 0 % (ref 4–12)
NEUTS SEG # BLD: 4.5 K/UL (ref 1.7–8.2)
NEUTS SEG NFR BLD: 83 % (ref 43–78)
NRBC # BLD: 0 K/UL (ref 0–0.2)
PLATELET # BLD AUTO: 287 K/UL (ref 150–450)
PMV BLD AUTO: 9 FL (ref 9.4–12.3)
POTASSIUM SERPL-SCNC: 3.6 MMOL/L (ref 3.5–5.1)
PROT SERPL-MCNC: 7.2 G/DL (ref 6.3–8.2)
RBC # BLD AUTO: 5.16 M/UL (ref 4.23–5.6)
SODIUM SERPL-SCNC: 135 MMOL/L (ref 136–145)
WBC # BLD AUTO: 5.5 K/UL (ref 4.3–11.1)

## 2022-08-29 PROCEDURE — 2580000003 HC RX 258: Performed by: INTERNAL MEDICINE

## 2022-08-29 PROCEDURE — 36591 DRAW BLOOD OFF VENOUS DEVICE: CPT

## 2022-08-29 PROCEDURE — 6360000002 HC RX W HCPCS: Performed by: INTERNAL MEDICINE

## 2022-08-29 PROCEDURE — 96375 TX/PRO/DX INJ NEW DRUG ADDON: CPT

## 2022-08-29 PROCEDURE — 6370000000 HC RX 637 (ALT 250 FOR IP): Performed by: INTERNAL MEDICINE

## 2022-08-29 PROCEDURE — 96409 CHEMO IV PUSH SNGL DRUG: CPT

## 2022-08-29 PROCEDURE — 99215 OFFICE O/P EST HI 40 MIN: CPT | Performed by: INTERNAL MEDICINE

## 2022-08-29 PROCEDURE — 80053 COMPREHEN METABOLIC PANEL: CPT

## 2022-08-29 PROCEDURE — 85025 COMPLETE CBC W/AUTO DIFF WBC: CPT

## 2022-08-29 RX ORDER — 0.9 % SODIUM CHLORIDE 0.9 %
500 INTRAVENOUS SOLUTION INTRAVENOUS ONCE
Status: COMPLETED | OUTPATIENT
Start: 2022-08-29 | End: 2022-08-29

## 2022-08-29 RX ORDER — ACETAMINOPHEN 325 MG/1
650 TABLET ORAL ONCE
Status: COMPLETED | OUTPATIENT
Start: 2022-08-29 | End: 2022-08-29

## 2022-08-29 RX ORDER — SODIUM CHLORIDE 0.9 % (FLUSH) 0.9 %
10 SYRINGE (ML) INJECTION PRN
Status: DISCONTINUED | OUTPATIENT
Start: 2022-08-29 | End: 2022-08-30 | Stop reason: HOSPADM

## 2022-08-29 RX ORDER — ONDANSETRON 2 MG/ML
4 INJECTION INTRAMUSCULAR; INTRAVENOUS EVERY 6 HOURS PRN
Status: DISCONTINUED | OUTPATIENT
Start: 2022-08-29 | End: 2022-08-30 | Stop reason: HOSPADM

## 2022-08-29 RX ORDER — SODIUM CHLORIDE 9 MG/ML
5-40 INJECTION INTRAVENOUS PRN
Status: DISCONTINUED | OUTPATIENT
Start: 2022-08-29 | End: 2022-08-30 | Stop reason: HOSPADM

## 2022-08-29 RX ORDER — SODIUM CHLORIDE 9 MG/ML
5-250 INJECTION, SOLUTION INTRAVENOUS PRN
Status: DISCONTINUED | OUTPATIENT
Start: 2022-08-29 | End: 2022-08-30 | Stop reason: HOSPADM

## 2022-08-29 RX ORDER — ONDANSETRON 2 MG/ML
4 INJECTION INTRAMUSCULAR; INTRAVENOUS ONCE
Status: COMPLETED | OUTPATIENT
Start: 2022-08-29 | End: 2022-08-29

## 2022-08-29 RX ORDER — DIPHENHYDRAMINE HYDROCHLORIDE 50 MG/ML
25 INJECTION INTRAMUSCULAR; INTRAVENOUS ONCE
Status: COMPLETED | OUTPATIENT
Start: 2022-08-29 | End: 2022-08-29

## 2022-08-29 RX ADMIN — SODIUM CHLORIDE 500 ML: 9 INJECTION, SOLUTION INTRAVENOUS at 12:30

## 2022-08-29 RX ADMIN — BLEOMYCIN 30 UNITS: 30 INJECTION, POWDER, LYOPHILIZED, FOR SOLUTION INTRAMUSCULAR; INTRAPLEURAL; INTRAVENOUS; SUBCUTANEOUS at 13:15

## 2022-08-29 RX ADMIN — SODIUM CHLORIDE 20 ML/HR: 9 INJECTION, SOLUTION INTRAVENOUS at 12:05

## 2022-08-29 RX ADMIN — Medication 10 ML: at 11:18

## 2022-08-29 RX ADMIN — ACETAMINOPHEN 650 MG: 325 TABLET ORAL at 12:13

## 2022-08-29 RX ADMIN — SODIUM CHLORIDE 500 ML: 9 INJECTION, SOLUTION INTRAVENOUS at 13:01

## 2022-08-29 RX ADMIN — SODIUM CHLORIDE, PRESERVATIVE FREE 10 ML: 5 INJECTION INTRAVENOUS at 13:48

## 2022-08-29 RX ADMIN — ONDANSETRON 4 MG: 2 INJECTION INTRAMUSCULAR; INTRAVENOUS at 12:42

## 2022-08-29 RX ADMIN — DIPHENHYDRAMINE HYDROCHLORIDE 25 MG: 50 INJECTION, SOLUTION INTRAMUSCULAR; INTRAVENOUS at 12:13

## 2022-08-29 RX ADMIN — ONDANSETRON 4 MG: 2 INJECTION INTRAMUSCULAR; INTRAVENOUS at 12:41

## 2022-08-29 NOTE — PATIENT INSTRUCTIONS
Patient Instructions from Today's Visit    Reason for Visit:  Pre chemo cycle 2 day 8 BEP    Plan:  Proceed to infusion     Follow Up:  1 week    Recent Lab Results:  Hospital Outpatient Visit on 08/29/2022   Component Date Value Ref Range Status    WBC 08/29/2022 5.5  4.3 - 11.1 K/uL Final    RBC 08/29/2022 5.16  4.23 - 5.6 M/uL Final    Hemoglobin 08/29/2022 14.8  13.6 - 17.2 g/dL Final    Hematocrit 08/29/2022 41.6  % Final    MCV 08/29/2022 80.6  79.6 - 97.8 FL Final    MCH 08/29/2022 28.7  26.1 - 32.9 PG Final    MCHC 08/29/2022 35.6 (A) 31.4 - 35.0 g/dL Final    RDW 08/29/2022 12.7  11.9 - 14.6 % Final    Platelets 48/23/1205 287  150 - 450 K/uL Final    MPV 08/29/2022 9.0 (A) 9.4 - 12.3 FL Final    nRBC 08/29/2022 0.00  0.0 - 0.2 K/uL Final    **Note: Absolute NRBC parameter is now reported with Hemogram**    Seg Neutrophils 08/29/2022 83 (A) 43 - 78 % Final    Lymphocytes 08/29/2022 16  13 - 44 % Final    Monocytes 08/29/2022 0 (A) 4.0 - 12.0 % Final    Eosinophils % 08/29/2022 0 (A) 0.5 - 7.8 % Final    Basophils 08/29/2022 0  0.0 - 2.0 % Final    Immature Granulocytes 08/29/2022 1  0.0 - 5.0 % Final    Segs Absolute 08/29/2022 4.5  1.7 - 8.2 K/UL Final    Absolute Lymph # 08/29/2022 0.9  0.5 - 4.6 K/UL Final    Absolute Mono # 08/29/2022 0.0 (A) 0.1 - 1.3 K/UL Final    Absolute Eos # 08/29/2022 0.0  0.0 - 0.8 K/UL Final    Basophils Absolute 08/29/2022 0.0  0.0 - 0.2 K/UL Final    Absolute Immature Granulocyte 08/29/2022 0.1  0.0 - 0.5 K/UL Final    Differential Type 08/29/2022 AUTOMATED    Final         Treatment Summary has been discussed and given to patient: no        -------------------------------------------------------------------------------------------------------------------  Please call our office at [phone] if you have any  of the following symptoms:   Fever of 100.5 or greater  Chills  Shortness of breath  Swelling or pain in one leg    After office hours an answering service is Progress Notes by Killian Mccann MD at 08/14/17 05:07 PM     Author:  Killian Mccann MD Service:  (none) Author Type:  Physician     Filed:  08/14/17 05:28 PM Encounter Date:  8/14/2017 Status:  Signed     :  Killian Mccann MD (Physician)              12+ Year Well Child Visit      Roomed by: Rupal Schaeffer CMA 5:07 PM     Accompanied by:[MW1.1T] Mother, Linda [phone] - ok to leave a message[MW1.1M]  SPORTS HISTORY:  On going medical problems?    Patient Active Problem List    Diagnosis    • Obesity   • Routine infant or child health check     Significant illnesses in the past?[MW1.1T] No[MW1.1M]  Any serious injuries related to sports?[MW1.1T]   None[MW1.1M]  Any known deformities (scoliosis, heart, absent paired organ)?[MW1.1T]   No[MW1.1M]  Family Hx of diabetes, bleeding, heart disease before age 50, etc.)?[MW1.1T]  No[MW1.1M]  Any fainting or dizziness while exercising?[MW1.1T]  No[MW1.1M]  Any loss of consciousness, concussion or head injury?[MW1.1T]  No[MW1.1M]    TB History[MW1.1T]  No[MW1.1M] -- Is there a family history of TB?[MW1.1T]   No[MW1.1M] -- Has the child been exposed to anyone who was in a correctional facility or works in one in the past 5 years?[MW1.1T]    No[MW1.1M] -- Is the child an immigrant or adopted from an endemic country - ([place], [place],    [place] or [place])?[MW1.1T]    No[MW1.1M] -- Is there a travel history to an endemic country ([place], [place], [place] or [place]) with exposure to the local population?[MW1.1T]  No[MW1.1M] -- Is there a patient history of HIV or family member with HIV?[MW1.1T]  No[MW1.1M] -- Has the child been exposed to a  resident of a nursing home, institutionalized adolescent, foster home or homeless shelter?   TB skin test indicated:[MW1.1T] Not at this time[MW1.1M]    Diabetic Screening[MW1.1T]  No[MW1.1M] -- Diabetes screening: family history[MW1.1T]  [MW1.1M] -- Ethnic background[MW1.1T]  No[MW1.1M] -- Signs of insulin  available and will contact a provider for emergencies or if you are experiencing any of the above symptoms. Patient did express an interest in My Chart. My Chart log in information explained on the after visit summary printout at the [facility] desk.     Ludy Villela RN, BSN  Nurse Navigator  [phone] live [email or web address] resistance (hypertension, dyslipidemia, polycystic ovarian syndrome).    SOCIAL DEVELOPMENT:  Home:[MW1.1T] No problems[JS1.1M]  Safety concerns (home, relationships):[MW1.1T] No[JS1.1T]  School: presently is[MW1.1T] in the 8th grade[JS1.1M].  School Progress:[MW1.1T] average grades[JS1.1M]  Activities:[MW1.1T] not active[JS1.1M]  Habits:   Smoking:[MW1.1T] denies[JS1.1M]  Alcohol:[MW1.1T] denies[JS1.1M]  Drugs:[MW1.1T] not asked[JS1.1M]  Sexuality:[MW1.1T] not asked[JS1.1M]    SUBJECTIVE:   Present health:[MW1.1T] Good[JS1.1T]  Hearing & vision:[MW1.1T] No Problems[JS1.1T]  Dental:[MW1.1T] No Problems[JS1.1T]    Diet:[MW1.1T] appropriate diet[JS1.1T]  Elimination:   BM s:[MW1.1T] No problems[JS1.1T]  Urine:[MW1.1T] No problems[JS1.1T]  Sleep:[MW1.1T] No problems[JS1.1T]  Menstrual hx:[MW1.1T] normal[JS1.1M]  Anemia Screening - cbc ordered?[MW1.1T] Not indicated at this time[JS1.1M]  Mental Health:[MW1.1T] No problems[JS1.1M]    PHQ-2 Depression Screening  Over the past 2 weeks, has patient felt down, depressed or hopeless?[MW1.1T] No[JS1.1M]  Over the past 2 weeks, has patient felt little interest or pleasure in doing things?[MW1.1T] No[JS1.1M]    On the basis of the above screen, the following is initiated:[MW1.1T]  Normal Screening will recheck the patient at the next well visit or sooner if indicated.[JS1.1T]    ROS  All other systems reviewed were negative     Allergies:  Review of patient's allergies indicates no known allergies.     Current Outpatient Prescriptions     Medication  Sig   • Multiple Vitamin (MULTIVITAMIN OR) None Entered       Past Medical history:[MW1.1T] No change in past medical history[JS1.1M]     Family history:[MW1.1T] No change in family history[JS1.1M]    Social history:[MW1.1T] Attends school[JS1.1M]     OBJECTIVE:   Physical exam  /80  Pulse 72  Temp 97.2 °F (36.2 °C) (Temporal)   Resp 16  Ht 5' 3.5\" (1.613 m)  Wt 195 lb 1.6 oz (88.5 kg)  LMP 07/23/2017 (Exact Date)  BMI  34.02 kg/m2    GENERAL: Evaluation of appearance.  Findings:[MW1.1T] Normal[JS1.1T]    HEAD & SCALP: Evaluation for lesions, swelling, tenderness and abnormalities.  Findings:[MW1.1T] Normal[JS1.1T]    EYES: Evaluation for redness, swelling, drainage and abnormalities.  Findings:[MW1.1T] Both eyes normal[JS1.1T]    EARS: Evaluation of  external ears, canals, and tm's  Findings:[MW1.1T] Normal[JS1.1T]    NOSE: Evaluation for swelling and drainage  Findings:[MW1.1T] Normal[JS1.1T]    MOUTH: Evaluation of tongue and mucosal membranes  Findings:[MW1.1T] Normal[JS1.1T]    THROAT: Evaluation for redness and lesions  Findings:[MW1.1T] Normal[JS1.1T]    NECK: Evaluation for masses, swelling and soreness  Findings:[MW1.1T] Normal[JS1.1T]    CHEST: Evaluation - auscultation and observation  Findings:[MW1.1T] Normal[JS1.1T]. BREAST:[MW1.1T] Normal[JS1.1T].  KANDICE SCALE:[MW1.1T] not examined[JS1.1M]     CARDIO: Evaluation by auscultation   Findings:[MW1.1T] Normal[JS1.1T]    ABDOMEN: Evaluation for bowel sounds, organomegaly, masses and tenderness  Findings:[MW1.1T] Normal - soft, no tenderness, no masses[JS1.1T]    : Evaluation by inspections.  Findings:[MW1.1T] not examined[JS1.1M]         KANDICE SCALE:[MW1.1T] not examined[JS1.1M]     MUS-SKEL: Evaluation for swelling, edema, range of motion or other abnormalities.  Findings:[MW1.1T] Normal[JS1.1T].      SKIN: Evaluation for rashes, acne and lesions  Findings:[MW1.1T] Normal[JS1.1T]    NEURO: Evaluation by observation.  Findings:[MW1.1T] Normal[JS1.1T]       ASSESSMENT:[MW1.1T]   Normal Health Maintenance Visit  DISCUSSED CONCERNS ABOUT WEIGHT.  NEED TO GET MORE EXERCISE  DISCUSSED BETTER EATING HABITS[JS1.1T]    WILL GET TSH[JS1.1M]  PLAN:  Medication changes:[MW1.1T] No[JS1.1M]  Immunizations given today?[MW1.1T] No.[JS1.1M]  See Orders  Discussed anticipatory guidance for screen time activity   Discussed the following  Topics:[MW1.1T] healthy eating habits, physical  activity,[JS1.1T] Sports form signed and returned to parent/patient.[JS1.1M]    Nutrition Assessment and Counseling  Diet is[MW1.1T] appropriate for age[JS1.1T].  Discussed healthy eating habits:[MW1.1T] Yes[JS1.1T]    Physical Activity Assessment and Counseling  Patient[MW1.1T] participates in physical activity[JS1.1T].  Discussed physical activity:[MW1.1T] Yes[JS1.1T]    Screen Time Assessment and Counseling  Number of hours of screen time per day:[MW1.1T] 1-2 hours[JS1.1M].  Discussed anticipatory guidance for screen time activity:[MW1.1T] Yes[JS1.1T]    Blood Pressure  Blood pressures is[MW1.1T] appropriate for age[JS1.1T].[MW1.1T]     PATIENT SEEN FOR PHYSICAL  EXAM  ROUTINE ADVICE GIVEN PER AGE  ALL QUESTIONS ANSWERED  ANY NECESSARY FORMS  GIVEN[JS1.1T]       Next suggested Well Child Checkup in 1 year  Call if questions or problems.  School form signed and returned to patient.[MW1.1T]    Electronically Signed by:    Killian Mccann MD , 8/14/2017[JS1.2T]        Revision History        User Key Date/Time User Provider Type Action    > JS1.2 08/14/17 05:28 PM Killian Mccann MD Physician Sign     JS1.1 08/14/17 05:27 PM Killian Mccann MD Physician      MW1.1 08/14/17 05:08 PM Rupal Schaeffer CMA Certified Medical Assistant Sign at close encounter    M - Manual, T - Template

## 2022-08-29 NOTE — PROGRESS NOTES
[facility] Hematology & Oncology: Office Visit Progress Note    Chief Complaint:    Testicular cancer    History of Present Illness:  38 y.o. male past medical history of hypertension, hyperlipidemia, diabetes, presented to the emergency room with testicle swelling and lower back and abdominal pain. Reported low back pain intermittently over the past month, did not have work-up yet. Now he noticed testicular swelling with pain and presented to the emergency room for further evaluation. Scrotal ultrasound showed large left side testicular mass highly concerning for testicular malignancy measuring 4.4 x 3.7 x 3.9 cm. CT showed massive retroperitoneal lymph nodes conglomerates and a largest retrocrural lymph nodes suspicious for lymphoma versus metastatic disease. There is severe luminal narrowing of the IVC given the mass-effect upon the IVC and mass-effect upon the abdominal aorta as well as the iliopsoas musculature. Oncology is consulted for recommendation. 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 Review of Systems:  Constitutional Anorexia, fatigue. Weight loss. Denies fever or chills. HEENT Denies trauma, bluring vision, hearing loss, ear pain, nosebleeds, sore throat, neck pain and ear discharge. Skin Denies lesions or rashes. Lungs Denies shortness of breath, cough, sputum production or hemoptysis. Cardiovascular Denies, palpitations, orthopnea, claudication and leg swelling. Gastrointestinal Denies nausea, vomiting, bowel changes. Denies bloody or black stools. Denies abdominal pain.  Status post left orchiectomy. Denies dysuria, frequency or hesitancy of urination   Neuro Denies headaches, visual changes or ataxia. Denies dizziness, tingling, tremors, sensory change, speech change, focal weakness and headaches.      Hematology Denies nasal/gum bleeding, denies easy bruise   Endo Denies heat/cold intolerance, denies diabetes. MSK Back pain. Denies swollen legs, myalgias and falls. Psychiatric/Behavioral Denies depression and substance abuse. The patient is not nervous/anxious. No Known Allergies  Past Medical History:   Diagnosis Date    Benign essential hypertension     controlled with med    Diabetes mellitus type 2, noninsulin dependent ([address]     oral reliant; AVG -105; pt does have s.s of hypoglycemia but does not know what BS is when it gets low; last A1C    Fatty liver     per pt    Hyperlipidemia     managed with med    Testicular mass     Left     Past Surgical History:   Procedure Laterality Date    APPENDECTOMY      IR PORT PLACEMENT EQUAL OR GREATER THAN 5 YEARS  7/22/2022    IR PORT PLACEMENT EQUAL OR GREATER THAN 5 YEARS 7/22/2022 [facility] RADIOLOGY SPECIALS    KIDNEY STONE SURGERY Right 2011    TESTICLE REMOVAL Left 7/7/2022    LEFT RADICAL ORCHIECTOMY Quince [facility] APPROACH performed by Clarence Montanez MD at [facility] MAIN OR     History reviewed. No pertinent family history.   Social History     Socioeconomic History    Marital status:      Spouse name: Not on file    Number of children: Not on file    Years of education: Not on file    Highest education level: Not on file   Occupational History    Not on file   Tobacco Use    Smoking status: Never    Smokeless tobacco: Never   Vaping Use    Vaping Use: Never used   Substance and Sexual Activity    Alcohol use: Yes     Comment: one or two per week    Drug use: Never    Sexual activity: Not on file   Other Topics Concern    Not on file   Social History Narrative    Not on file     Social Determinants of Health     Financial Resource Strain: Not on file   Food Insecurity: Not on file   Transportation Needs: Not on file   Physical Activity: Not on file   Stress: Not on file   Social Connections: Not on file   Intimate Partner Violence: Not on file   Housing Stability: Not on file     Current Outpatient Medications Medication Sig Dispense Refill    zolpidem (AMBIEN CR) 12.5 MG extended release tablet Take 1 tablet by mouth nightly as needed for Sleep for up to 14 days. 30 tablet 1    HYDROcodone-acetaminophen (NORCO) 7.5-325 MG per tablet PLEASE SEE ATTACHED FOR DETAILED DIRECTIONS      lidocaine-prilocaine (EMLA) 2.5-2.5 % cream Apply topically as needed. 1 each 2    ondansetron (ZOFRAN-ODT) 8 MG TBDP disintegrating tablet Place 1 tablet under the tongue every 8 hours as needed for Nausea or Vomiting 90 tablet 2    prochlorperazine (COMPAZINE) 10 MG tablet Take 1 tablet by mouth every 6 hours as needed (cinv) 90 tablet 2    sennosides-docusate sodium (SENOKOT-S) 8.6-50 MG tablet Take 1 tablet by mouth daily as needed for Constipation 30 tablet 0    telmisartan-amLODIPine (TWYNSTA) 80-5 MG TABS tablet Take 1 tablet by mouth daily      atenolol (TENORMIN) 50 MG tablet Take 50 mg by mouth daily      atorvastatin (LIPITOR) 20 MG tablet Take 20 mg by mouth nightly      metFORMIN (GLUCOPHAGE-XR) 500 MG extended release tablet Take 2 with breakfast and supper - Diabetes      spironolactone (ALDACTONE) 50 MG tablet Take 50 mg by mouth daily       No current facility-administered medications for this visit.      Facility-Administered Medications Ordered in Other Visits   Medication Dose Route Frequency Provider Last Rate Last Admin    sodium chloride flush 0.9 % injection 10 mL  10 mL IntraVENous PRN Vicki Encarnacion MD   10 mL at 08/29/22 1118    0.9 % sodium chloride infusion  5-250 mL/hr IntraVENous PRN Vicki Encarnacion MD 20 mL/hr at 08/29/22 1205 20 mL/hr at 08/29/22 1205    bleomycin (BLEOCIN) 30 Units in sodium chloride 0.9 % 50 mL chemo IVPB  30 Units IntraVENous Once Vicki Encarnacion MD        sodium chloride (PF) 0.9 % injection 5-40 mL  5-40 mL IntraVENous PRN Vicki Encarnacion MD        ondansetron [facility]) injection 4 mg  4 mg IntraVENous Q6H PRN Vicki Encarnacion MD   4 mg at 08/29/22 1242       OBJECTIVE:  /75 (Position: Standing)   Pulse (!) 127   Temp 98.5 °F (36.9 °C)   Resp 16   Ht 6' 1\" (1.854 m)   Wt 233 lb (105.7 kg)   SpO2 97%   BMI 30.74 kg/m²     Physical Exam:  Constitutional: Oriented to person, place, and time. Well-developed and well-nourished. HEENT: Normocephalic and atraumatic. Oropharynx is clear and moist.   Conjunctivae and EOM are normal. Pupils are equal, round, and reactive to light. No scleral icterus. Neck supple. No JVD present. No tracheal deviation present. No thyromegaly present. Lymph node No palpable submandibular, cervical, supraclavicular, axillary and inguinal lymph nodes. Skin Warm and dry. No bruising and no rash noted. No erythema. No pallor. Respiratory Effort normal and breath sounds normal.  No respiratory distress. No wheezes. No rales. No tenderness. CVS Normal rate, regular rhythm and normal heart sounds. Exam reveals no gallop, no friction and no rub. No murmur heard. Abdomen Status post left orchiectomy. Right testis soft nontender no mass. Soft. Bowel sounds are normal. Exhibits no distension. There is no tenderness. There is no rebound and no guarding. Neuro Normal reflexes. No cranial nerve deficit. Exhibits normal muscle tone, 5 of 5 strength of all extremities. MSK Normal range of motion in general.  No edema and no tenderness.    Psych Normal mood, affect, behavior, judgment and thought content      Labs:  Recent Results (from the past 24 hour(s))   CBC with Auto Differential    Collection Time: 08/29/22 11:11 AM   Result Value Ref Range    WBC 5.5 4.3 - 11.1 K/uL    RBC 5.16 4.23 - 5.6 M/uL    Hemoglobin 14.8 13.6 - 17.2 g/dL    Hematocrit 41.6 %    MCV 80.6 79.6 - 97.8 FL    MCH 28.7 26.1 - 32.9 PG    MCHC 35.6 (H) 31.4 - 35.0 g/dL    RDW 12.7 11.9 - 14.6 %    Platelets 988 047 - 970 K/uL    MPV 9.0 (L) 9.4 - 12.3 FL    nRBC 0.00 0.0 - 0.2 K/uL    Seg Neutrophils 83 (H) 43 - 78 %    Lymphocytes 16 13 - 44 %    Monocytes 0 (L) 4.0 - 12.0 %    Eosinophils % 0 (L) 0.5 - 7.8 %    Basophils 0 0.0 - 2.0 %    Immature Granulocytes 1 0.0 - 5.0 %    Segs Absolute 4.5 1.7 - 8.2 K/UL    Absolute Lymph # 0.9 0.5 - 4.6 K/UL    Absolute Mono # 0.0 (L) 0.1 - 1.3 K/UL    Absolute Eos # 0.0 0.0 - 0.8 K/UL    Basophils Absolute 0.0 0.0 - 0.2 K/UL    Absolute Immature Granulocyte 0.1 0.0 - 0.5 K/UL    Differential Type AUTOMATED     Comprehensive Metabolic Panel    Collection Time: 08/29/22 11:11 AM   Result Value Ref Range    Sodium 135 (L) 136 - 145 mmol/L    Potassium 3.6 3.5 - 5.1 mmol/L    Chloride 102 98 - 107 mmol/L    CO2 24 21 - 32 mmol/L    Anion Gap 9 7 - 16 mmol/L    Glucose 223 (H) 65 - 100 mg/dL    BUN 27 (H) 6 - 23 MG/DL    Creatinine 1.20 0.8 - 1.5 MG/DL    GFR African American >60 >60 ml/min/1.73m2    GFR Non- >60 >60 ml/min/1.73m2    Calcium 8.5 8.3 - 10.4 MG/DL    Total Bilirubin 0.7 0.2 - 1.1 MG/DL    ALT 33 12 - 65 U/L    AST 14 (L) 15 - 37 U/L    Alk Phosphatase 88 50 - 136 U/L    Total Protein 7.2 6.3 - 8.2 g/dL    Albumin 3.4 (L) 3.5 - 5.0 g/dL    Globulin 3.8 (H) 2.3 - 3.5 g/dL    Albumin/Globulin Ratio 0.9 (L) 1.2 - 3.5           Imaging:  No results found for this or any previous visit. ASSESSMENT/PLAN:   Diagnosis Orders   1. Seminoma ([address]  CBC with Auto Differential    Comprehensive Metabolic Panel      2. High risk medication use        3.  Weight loss                    38 y.o. 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, he need tumor markers for further diagnosis, patient and wife are tearful and discussed that this was a potentially rather curable cancer, typically would pursue inguinal radical orchiectomy for diagnosis and local disease control, nonetheless he is a typical case radiographically if beta-hCG is highly elevated, and the severe narrowing of IVC is of clinical concern of complication unless chemotherapy is started soon, discussed with uroncology and may start chemo urgently if needed, discussed options with patient and he agrees with starting chemotherapy as soon as possible to avoid further complication, elevated beta-hCG and normal AFP consistent with seminoma, completed sperm banking and received a left radical inguinal orchiectomy on 7/7/2022 and the pathology showed pure seminoma, followed in office on 7/19/2022 and we discussed the result, beta-hCG and LDH increasing rapidly despite of the resection, increased back pain and night sweat, placed port and start BEP x3 7/25/2022, return on 8/1/2022, hCG down to 82, however not feeling well and lost 14 pounds in the week, not short of breath but reports some atypical chest pain that lasts for seconds each time, tachycardic and blood pressure lower than baseline, stat CT chest to rule out PE, arrange IV fluid for dehydration and received a day 8 bleomycin, return 8/8/2022, chest pain and short of breath resolved, but having mucositis with difficulty eating, prescribed Peridex and Magic mouthwash, discussed ways to improve nutrition and hydration, return on 8/29/2022, lost 10 pounds, discussed proper nutrition intake, very tired and sleeping a lot, discussed hCG became undetectable, labs reviewed and proceed to cycle 2-day 8 bleomycin, continue Ambien only as needed for insomnia, return next week but call as needed. Baseline PFT WNL. All questions are answered to their satisfaction. They will call for further questions and concerns. ECOG PERFORMANCE STATUS - 0-Fully active, able to carry on all pre-disease performance without restriction.     Pain - 0 - No pain/10. Mild to moderate pain, requiring medication - see MAR     Fatigue - No flowsheet data found. Distress - No flowsheet data found. Total time independently spent on today's visit was 40min. This time included: face-to-face time evaluating the patient as well as additional non-face-to-face time spent on: Preparing to see the patient by obtaining and reviewing previous test results, records and medical history, Performing a medically appropriate history and exam and documenting relevant clinical information for this visit, Counseling and educating patient and family, Ordering medications, Communicating with other health care professionals and Referring patient to another health care provider. Elements of this note have been dictated via voice recognition software. Text and phrases may be limited by the accuracy and autoconversion of the software. The chart has been reviewed, but errors may still be present. Jacobo Al M.D.   [facility]  [address] [facility] [address], [address] [place] [address]  Office : [phone]  Fax : [phone]

## 2022-08-29 NOTE — PROGRESS NOTES
Arrived to the [facility]. Assessment completed, labs reviewed. Bleomycin and NS 1 liter completed. Patient tolerated without problems. Any issues or concerns during appointment: None  Patient instructed to call provider with temperature of 100.4 or greater or nausea/vomiting/ diarrhea or pain not controlled by medications  Instructed to call Dr Pierce Lockett with any side effects or other concerns  Patient aware of next infusion appointment on 9/6/22(date) at 6 30 AM (time).   Discharged ambulatory with family

## 2022-09-06 ENCOUNTER — CLINICAL DOCUMENTATION (OUTPATIENT)
Dept: CASE MANAGEMENT | Age: 42
End: 2022-09-06

## 2022-09-06 ENCOUNTER — HOSPITAL ENCOUNTER (OUTPATIENT)
Dept: INFUSION THERAPY | Age: 42
Discharge: HOME | End: 2022-09-06
Payer: COMMERCIAL

## 2022-09-06 ENCOUNTER — CLINICAL DOCUMENTATION (OUTPATIENT)
Dept: ONCOLOGY | Age: 42
End: 2022-09-06

## 2022-09-06 ENCOUNTER — OFFICE VISIT (OUTPATIENT)
Dept: ONCOLOGY | Age: 42
End: 2022-09-06
Payer: COMMERCIAL

## 2022-09-06 ENCOUNTER — HOSPITAL ENCOUNTER (OUTPATIENT)
Dept: INFUSION THERAPY | Age: 42
Discharge: HOME | End: 2022-09-06

## 2022-09-06 VITALS
TEMPERATURE: 97.9 F | SYSTOLIC BLOOD PRESSURE: 117 MMHG | WEIGHT: 241.9 LBS | BODY MASS INDEX: 32.06 KG/M2 | OXYGEN SATURATION: 97 % | RESPIRATION RATE: 17 BRPM | HEIGHT: 73 IN | DIASTOLIC BLOOD PRESSURE: 90 MMHG | HEART RATE: 69 BPM

## 2022-09-06 DIAGNOSIS — Z79.899 HIGH RISK MEDICATION USE: ICD-10-CM

## 2022-09-06 DIAGNOSIS — T45.1X5A CHEMOTHERAPY-INDUCED NEUTROPENIA (HCC): ICD-10-CM

## 2022-09-06 DIAGNOSIS — K64.9 HEMORRHOIDS, UNSPECIFIED HEMORRHOID TYPE: ICD-10-CM

## 2022-09-06 DIAGNOSIS — D70.1 CHEMOTHERAPY-INDUCED NEUTROPENIA (HCC): ICD-10-CM

## 2022-09-06 DIAGNOSIS — G47.00 INSOMNIA, UNSPECIFIED TYPE: ICD-10-CM

## 2022-09-06 DIAGNOSIS — C62.90 SEMINOMA (HCC): Primary | ICD-10-CM

## 2022-09-06 DIAGNOSIS — R21 RASH: ICD-10-CM

## 2022-09-06 DIAGNOSIS — C62.90 SEMINOMA (HCC): ICD-10-CM

## 2022-09-06 LAB
ALBUMIN SERPL-MCNC: 3.5 G/DL (ref 3.5–5)
ALBUMIN/GLOB SERPL: 0.9 {RATIO} (ref 1.2–3.5)
ALP SERPL-CCNC: 99 U/L (ref 50–136)
ALT SERPL-CCNC: 37 U/L (ref 12–65)
ANION GAP SERPL CALC-SCNC: 6 MMOL/L (ref 4–13)
AST SERPL-CCNC: 14 U/L (ref 15–37)
BASOPHILS # BLD: 0 K/UL (ref 0–0.2)
BASOPHILS NFR BLD: 2 % (ref 0–2)
BILIRUB SERPL-MCNC: 0.4 MG/DL (ref 0.2–1.1)
BUN SERPL-MCNC: 6 MG/DL (ref 6–23)
CALCIUM SERPL-MCNC: 8.6 MG/DL (ref 8.3–10.4)
CHLORIDE SERPL-SCNC: 107 MMOL/L (ref 101–110)
CO2 SERPL-SCNC: 25 MMOL/L (ref 21–32)
CREAT SERPL-MCNC: 1 MG/DL (ref 0.8–1.5)
DIFFERENTIAL METHOD BLD: ABNORMAL
EOSINOPHIL # BLD: 0.1 K/UL (ref 0–0.8)
EOSINOPHIL NFR BLD: 8 % (ref 0.5–7.8)
ERYTHROCYTE [DISTWIDTH] IN BLOOD BY AUTOMATED COUNT: 13.4 % (ref 11.9–14.6)
GLOBULIN SER CALC-MCNC: 3.7 G/DL (ref 2.3–3.5)
GLUCOSE SERPL-MCNC: 180 MG/DL (ref 65–100)
HCT VFR BLD AUTO: 35.3 %
HGB BLD-MCNC: 12.2 G/DL (ref 13.6–17.2)
IMM GRANULOCYTES # BLD AUTO: 0 K/UL (ref 0–0.5)
IMM GRANULOCYTES NFR BLD AUTO: 2 % (ref 0–5)
LYMPHOCYTES # BLD: 0.8 K/UL (ref 0.5–4.6)
LYMPHOCYTES NFR BLD: 58 % (ref 13–44)
MCH RBC QN AUTO: 29.3 PG (ref 26.1–32.9)
MCHC RBC AUTO-ENTMCNC: 34.6 G/DL (ref 31.4–35)
MCV RBC AUTO: 84.9 FL (ref 79.6–97.8)
MONOCYTES # BLD: 0.2 K/UL (ref 0.1–1.3)
MONOCYTES NFR BLD: 15 % (ref 4–12)
NEUTS SEG # BLD: 0.2 K/UL (ref 1.7–8.2)
NEUTS SEG NFR BLD: 16 % (ref 43–78)
NRBC # BLD: 0 K/UL (ref 0–0.2)
PLATELET # BLD AUTO: 72 K/UL (ref 150–450)
PMV BLD AUTO: 9.7 FL (ref 9.4–12.3)
POTASSIUM SERPL-SCNC: 3.9 MMOL/L (ref 3.5–5.1)
PROT SERPL-MCNC: 7.2 G/DL (ref 6.3–8.2)
RBC # BLD AUTO: 4.16 M/UL (ref 4.23–5.6)
SODIUM SERPL-SCNC: 138 MMOL/L (ref 136–145)
WBC # BLD AUTO: 1.3 K/UL (ref 4.3–11.1)

## 2022-09-06 PROCEDURE — 2580000003 HC RX 258: Performed by: INTERNAL MEDICINE

## 2022-09-06 PROCEDURE — 99215 OFFICE O/P EST HI 40 MIN: CPT | Performed by: INTERNAL MEDICINE

## 2022-09-06 PROCEDURE — 36591 DRAW BLOOD OFF VENOUS DEVICE: CPT

## 2022-09-06 PROCEDURE — 80053 COMPREHEN METABOLIC PANEL: CPT

## 2022-09-06 PROCEDURE — 85025 COMPLETE CBC W/AUTO DIFF WBC: CPT

## 2022-09-06 RX ORDER — SODIUM CHLORIDE 0.9 % (FLUSH) 0.9 %
10 SYRINGE (ML) INJECTION PRN
Status: DISCONTINUED | OUTPATIENT
Start: 2022-09-06 | End: 2022-09-07 | Stop reason: HOSPADM

## 2022-09-06 RX ORDER — HYDROCORTISONE 25 MG/G
CREAM TOPICAL
Qty: 28 G | Refills: 1 | Status: SHIPPED | OUTPATIENT
Start: 2022-09-06 | End: 2022-10-10 | Stop reason: SDUPTHER

## 2022-09-06 RX ADMIN — SODIUM CHLORIDE, PRESERVATIVE FREE 10 ML: 5 INJECTION INTRAVENOUS at 09:30

## 2022-09-06 ASSESSMENT — PATIENT HEALTH QUESTIONNAIRE - PHQ9
2. FEELING DOWN, DEPRESSED OR HOPELESS: 0
SUM OF ALL RESPONSES TO PHQ QUESTIONS 1-9: 0
SUM OF ALL RESPONSES TO PHQ9 QUESTIONS 1 & 2: 0
SUM OF ALL RESPONSES TO PHQ QUESTIONS 1-9: 0
1. LITTLE INTEREST OR PLEASURE IN DOING THINGS: 0

## 2022-09-06 NOTE — PROGRESS NOTES
WBC-1.3 read back and verified. Message to [facility].

## 2022-09-06 NOTE — PROGRESS NOTES
[facility]: Office Visit Progress Note    Chief Complaint:    Testicular cancer    History of Present Illness:  38 y.o. male past medical history of hypertension, hyperlipidemia, diabetes, presented to the emergency room with testicle swelling and lower back and abdominal pain. Reported low back pain intermittently over the past month, did not have work-up yet. Now he noticed testicular swelling with pain and presented to the emergency room for further evaluation. Scrotal ultrasound showed large left side testicular mass highly concerning for testicular malignancy measuring 4.4 x 3.7 x 3.9 cm. CT showed massive retroperitoneal lymph nodes conglomerates and a largest retrocrural lymph nodes suspicious for lymphoma versus metastatic disease. There is severe luminal narrowing of the IVC given the mass-effect upon the IVC and mass-effect upon the abdominal aorta as well as the iliopsoas musculature. Oncology is consulted for recommendation. 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 Review of Systems:  Constitutional Anorexia, fatigue. Weight loss. Denies fever or chills. HEENT Denies trauma, bluring vision, hearing loss, ear pain, nosebleeds, sore throat, neck pain and ear discharge. Skin Rash on scalp. Denies rashes. Lungs Denies shortness of breath, cough, sputum production or hemoptysis. Cardiovascular Denies, palpitations, orthopnea, claudication and leg swelling. Gastrointestinal Denies nausea, vomiting, bowel changes. Denies bloody or black stools. Denies abdominal pain.  Status post left orchiectomy. Denies dysuria, frequency or hesitancy of urination   Neuro Denies headaches, visual changes or ataxia. Denies dizziness, tingling, tremors, sensory change, speech change, focal weakness and headaches.      Hematology Denies nasal/gum bleeding, denies easy bruise Endo Denies heat/cold intolerance, denies diabetes. MSK Back pain. Denies swollen legs, myalgias and falls. Psychiatric/Behavioral Denies depression and substance abuse. The patient is not nervous/anxious. No Known Allergies  Past Medical History:   Diagnosis Date    Benign essential hypertension     controlled with med    Diabetes mellitus type 2, noninsulin dependent ([address]     oral reliant; AVG -105; pt does have s.s of hypoglycemia but does not know what BS is when it gets low; last A1C    Fatty liver     per pt    Hyperlipidemia     managed with med    Testicular mass     Left     Past Surgical History:   Procedure Laterality Date    APPENDECTOMY      IR PORT PLACEMENT EQUAL OR GREATER THAN 5 YEARS  7/22/2022    IR PORT PLACEMENT EQUAL OR GREATER THAN 5 YEARS 7/22/2022 [facility] SPECI[facility]    KIDNEY STONE SURGERY Right 2011    TESTICLE REMOVAL Left 7/7/2022    LEFT RADICAL ORCHIECTOMY Judyann Milling APPROACH performed by Missy Moncada MD at [facility] MAIN OR     History reviewed. No pertinent family history.   Social History     Socioeconomic History    Marital status:      Spouse name: Not on file    Number of children: Not on file    Years of education: Not on file    Highest education level: Not on file   Occupational History    Not on file   Tobacco Use    Smoking status: Never    Smokeless tobacco: Never   Vaping Use    Vaping Use: Never used   Substance and Sexual Activity    Alcohol use: Yes     Comment: one or two per week    Drug use: Never    Sexual activity: Not on file   Other Topics Concern    Not on file   Social History Narrative    Not on file     Social Determinants of Health     Financial Resource Strain: Not on file   Food Insecurity: Not on file   Transportation Needs: Not on file   Physical Activity: Not on file   Stress: Not on file   Social Connections: Not on file   Intimate Partner Violence: Not on file   Housing Stability: Not on file     Current Outpatient Medications   Medication Sig Dispense Refill    hydrocortisone (ANUSOL-HC) 2.5 % CREA rectal cream Apply 2-3 times a day 28 g 1    lidocaine-prilocaine (EMLA) 2.5-2.5 % cream Apply topically as needed. 1 each 2    ondansetron (ZOFRAN-ODT) 8 MG TBDP disintegrating tablet Place 1 tablet under the tongue every 8 hours as needed for Nausea or Vomiting 90 tablet 2    prochlorperazine (COMPAZINE) 10 MG tablet Take 1 tablet by mouth every 6 hours as needed (cinv) 90 tablet 2    telmisartan-amLODIPine (TWYNSTA) 80-5 MG TABS tablet Take 1 tablet by mouth daily      atenolol (TENORMIN) 50 MG tablet Take 50 mg by mouth daily      atorvastatin (LIPITOR) 20 MG tablet Take 20 mg by mouth nightly      metFORMIN (GLUCOPHAGE-XR) 500 MG extended release tablet Take 2 with breakfast and supper - Diabetes      spironolactone (ALDACTONE) 50 MG tablet Take 50 mg by mouth daily       No current facility-administered medications for this visit. Facility-Administered Medications Ordered in Other Visits   Medication Dose Route Frequency Provider Last Rate Last Admin    sodium chloride flush 0.9 % injection 10 mL  10 mL IntraVENous PRN Vignesh Acosta MD   10 mL at 09/06/22 0930       OBJECTIVE:  BP (!) 117/90 (Site: Right Upper Arm, Position: Sitting, Cuff Size: Large Adult)   Pulse 69   Temp 97.9 °F (36.6 °C) (Oral)   Resp 17   Ht 6' 1\" (1.854 m)   Wt 241 lb 14.4 oz (109.7 kg)   SpO2 97%   BMI 31.91 kg/m²     Physical Exam:  Constitutional: Oriented to person, place, and time. Well-developed and well-nourished. HEENT: Normocephalic and atraumatic. Oropharynx is clear and moist.   Conjunctivae and EOM are normal. Pupils are equal, round, and reactive to light. No scleral icterus. Neck supple. No JVD present. No tracheal deviation present. No thyromegaly present. Lymph node No palpable submandibular, cervical, supraclavicular, axillary and inguinal lymph nodes. Skin Rash on scalp. Warm and dry. No bruising noted.   No erythema. No pallor. Respiratory Effort normal and breath sounds normal.  No respiratory distress. No wheezes. No rales. No tenderness. CVS Normal rate, regular rhythm and normal heart sounds. Exam reveals no gallop, no friction and no rub. No murmur heard. Abdomen Status post left orchiectomy. Right testis soft nontender no mass. Soft. Bowel sounds are normal. Exhibits no distension. There is no tenderness. There is no rebound and no guarding. Neuro Normal reflexes. No cranial nerve deficit. Exhibits normal muscle tone, 5 of 5 strength of all extremities. MSK Normal range of motion in general.  No edema and no tenderness.    Psych Normal mood, affect, behavior, judgment and thought content      Labs:  Recent Results (from the past 24 hour(s))   CBC with Auto Differential    Collection Time: 09/06/22  9:27 AM   Result Value Ref Range    WBC 1.3 (LL) 4.3 - 11.1 K/uL    RBC 4.16 (L) 4.23 - 5.6 M/uL    Hemoglobin 12.2 (L) 13.6 - 17.2 g/dL    Hematocrit 35.3 %    MCV 84.9 79.6 - 97.8 FL    MCH 29.3 26.1 - 32.9 PG    MCHC 34.6 31.4 - 35.0 g/dL    RDW 13.4 11.9 - 14.6 %    Platelets 72 (L) 469 - 450 K/uL    MPV 9.7 9.4 - 12.3 FL    nRBC 0.00 0.0 - 0.2 K/uL    Differential Type AUTOMATED      Seg Neutrophils 16 (L) 43 - 78 %    Lymphocytes 58 (H) 13 - 44 %    Monocytes 15 (H) 4.0 - 12.0 %    Eosinophils % 8 (H) 0.5 - 7.8 %    Basophils 2 0.0 - 2.0 %    Immature Granulocytes 2 0.0 - 5.0 %    Segs Absolute 0.2 (L) 1.7 - 8.2 K/UL    Absolute Lymph # 0.8 0.5 - 4.6 K/UL    Absolute Mono # 0.2 0.1 - 1.3 K/UL    Absolute Eos # 0.1 0.0 - 0.8 K/UL    Basophils Absolute 0.0 0.0 - 0.2 K/UL    Absolute Immature Granulocyte 0.0 0.0 - 0.5 K/UL   Comprehensive Metabolic Panel    Collection Time: 09/06/22  9:27 AM   Result Value Ref Range    Sodium 138 136 - 145 mmol/L    Potassium 3.9 3.5 - 5.1 mmol/L    Chloride 107 101 - 110 mmol/L    CO2 25 21 - 32 mmol/L    Anion Gap 6 4 - 13 mmol/L    Glucose 180 (H) 65 - 100 mg/dL    BUN 6 6 - 23 MG/DL    Creatinine 1.00 0.8 - 1.5 MG/DL    GFR African American >60 >60 ml/min/1.73m2    GFR Non- >60 >60 ml/min/1.73m2    Calcium 8.6 8.3 - 10.4 MG/DL    Total Bilirubin 0.4 0.2 - 1.1 MG/DL    ALT 37 12 - 65 U/L    AST 14 (L) 15 - 37 U/L    Alk Phosphatase 99 50 - 136 U/L    Total Protein 7.2 6.3 - 8.2 g/dL    Albumin 3.5 3.5 - 5.0 g/dL    Globulin 3.7 (H) 2.3 - 3.5 g/dL    Albumin/Globulin Ratio 0.9 (L) 1.2 - 3.5             Imaging:  No results found for this or any previous visit. ASSESSMENT/PLAN:   Diagnosis Orders   1. Seminoma (HCC)  CBC with Auto Differential    Comprehensive Metabolic Panel    Magnesium      2. High risk medication use        3. Insomnia, unspecified type        4. Chemotherapy-induced neutropenia (HCC)        5. Hemorrhoids, unspecified hemorrhoid type        6.  Rash            38 y.o. 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, he need tumor markers for further diagnosis, patient and wife are tearful and discussed that this was a potentially rather curable cancer, typically would pursue inguinal radical orchiectomy for diagnosis and local disease control, nonetheless he is a typical case radiographically if beta-hCG is highly elevated, and the severe narrowing of IVC is of clinical concern of complication unless chemotherapy is started soon, discussed with uroncology and may start chemo urgently if needed, discussed options with patient and he agrees with starting chemotherapy as soon as possible to avoid further complication, elevated beta-hCG and normal AFP consistent with seminoma, completed sperm banking and received a left radical inguinal orchiectomy on 7/7/2022 and the pathology showed pure seminoma, followed in office on 7/19/2022 and we discussed the result, beta-hCG and LDH increasing rapidly despite of the resection, increased back pain and night sweat, placed port and start BEP x3 7/25/2022, return on 8/1/2022, hCG down to 82, however not feeling well and lost 14 pounds in the week, not short of breath but reports some atypical chest pain that lasts for seconds each time, tachycardic and blood pressure lower than baseline, stat CT chest to rule out PE, arrange IV fluid for dehydration and received a day 8 bleomycin, return 8/8/2022, chest pain and short of breath resolved, but having mucositis with difficulty eating, prescribed Peridex and Magic mouthwash, discussed ways to improve nutrition and hydration, return on 8/29/2022, lost 10 pounds, discussed proper nutrition intake, very tired and sleeping a lot, discussed hCG became undetectable, labs reviewed and ANC 0.2, defer cycle 2-day 15 bleomycin, Anusol for hemorrhoid, Flagyl cream for rash, continue Ambien only as needed for insomnia, return next week but call as needed. Baseline PFT WNL. All questions are answered to their satisfaction. They will call for further questions and concerns. ECOG PERFORMANCE STATUS - 0-Fully active, able to carry on all pre-disease performance without restriction. Pain - 0 - No pain/10. Mild to moderate pain, requiring medication - see MAR     Fatigue - No flowsheet data found. Distress - No flowsheet data found. Total time independently spent on today's visit was 40min.  This time included: face-to-face time evaluating the patient as well as additional non-face-to-face time spent on: Preparing to see the patient by obtaining and reviewing previous test results, records and medical history, Performing a medically appropriate history and exam and documenting relevant clinical information for this visit, Counseling and educating patient and family, Ordering medications, Communicating with other health care professionals and Referring patient to another health care provider. Elements of this note have been dictated via voice recognition software. Text and phrases may be limited by the accuracy and autoconversion of the software. The chart has been reviewed, but errors may still be present. Cris Garcia M.D.   [address]  Office : [phone]  Fax : [phone]

## 2022-09-06 NOTE — PROGRESS NOTES
Patient arrived to port lab for port access and lab draw   [address] accessed and labs drawn per protocol   *Port remains accessed. Patient discharged from port lab ambulatory. *

## 2022-09-06 NOTE — PATIENT INSTRUCTIONS
Patient Instructions from Today's Visit    Reason for Visit:  Pre chemo cycle 2 day 15 BEP    Plan:  Delay chemo 1 week due to low white blood count   2 prescriptions sent to pharmacy    Follow Up:  1 week    Recent Lab Results:  Hospital Outpatient Visit on 09/06/2022   Component Date Value Ref Range Status    WBC 09/06/2022 PENDING  K/uL Incomplete    RBC 09/06/2022 PENDING  M/uL Incomplete    Hemoglobin 09/06/2022 PENDING  g/dL Incomplete    Hematocrit 09/06/2022 PENDING  % Incomplete    MCV 09/06/2022 PENDING  FL Incomplete    [facility] 09/06/2022 PENDING  PG Incomplete    MCHC 09/06/2022 PENDING  g/dL Incomplete    RDW 09/06/2022 PENDING  % Incomplete    Platelets 27/62/0776 PENDING  K/uL Incomplete    MPV 09/06/2022 PENDING  FL Incomplete    nRBC 09/06/2022 PENDING  K/uL Incomplete    Differential Type 09/06/2022 PENDING   Incomplete         Treatment Summary has been discussed and given to patient: no        -------------------------------------------------------------------------------------------------------------------  Please call our office at [phone] if you have any  of the following symptoms:   Fever of 100.5 or greater  Chills  Shortness of breath  Swelling or pain in one leg    After office hours an answering service is available and will contact a provider for emergencies or if you are experiencing any of the above symptoms. Patient did express an interest in My Chart. My Chart log in information explained on the after visit summary printout at the . [address] desk.     Bonita Malave RN, BSN  Nurse Navigator  [phone] live [email or web address]

## 2022-09-06 NOTE — PROGRESS NOTES
9/6/22 saw pt today with Dr. Devan Le for pre chemo c2d15 BEP. Will delay 1 week due to [address]. He is feeling well. Reporting issue with rash on scalp and hemorrhoids, will send cream to pharmacy for both. PO intake is good. Follow up next week. Encouraged to call with any concerns. Port needle removed. Navigation will continue to follow.

## 2022-09-07 RX ORDER — METRONIDAZOLE 7.5 MG/G
GEL TOPICAL
Qty: 45 G | Refills: 1 | Status: SHIPPED | OUTPATIENT
Start: 2022-09-07

## 2022-09-12 ENCOUNTER — OFFICE VISIT (OUTPATIENT)
Dept: ONCOLOGY | Age: 42
End: 2022-09-12
Payer: COMMERCIAL

## 2022-09-12 ENCOUNTER — HOSPITAL ENCOUNTER (OUTPATIENT)
Dept: INFUSION THERAPY | Age: 42
Discharge: HOME | End: 2022-09-12
Payer: COMMERCIAL

## 2022-09-12 ENCOUNTER — CLINICAL DOCUMENTATION (OUTPATIENT)
Dept: CASE MANAGEMENT | Age: 42
End: 2022-09-12

## 2022-09-12 VITALS
SYSTOLIC BLOOD PRESSURE: 117 MMHG | DIASTOLIC BLOOD PRESSURE: 75 MMHG | RESPIRATION RATE: 16 BRPM | WEIGHT: 243.8 LBS | OXYGEN SATURATION: 97 % | HEIGHT: 73 IN | BODY MASS INDEX: 32.31 KG/M2 | TEMPERATURE: 97.8 F | HEART RATE: 85 BPM

## 2022-09-12 DIAGNOSIS — C62.90 SEMINOMA (HCC): Primary | ICD-10-CM

## 2022-09-12 DIAGNOSIS — C62.90 SEMINOMA (HCC): ICD-10-CM

## 2022-09-12 DIAGNOSIS — Z79.899 HIGH RISK MEDICATION USE: ICD-10-CM

## 2022-09-12 DIAGNOSIS — T45.1X5A CHEMOTHERAPY-INDUCED NEUTROPENIA (HCC): ICD-10-CM

## 2022-09-12 DIAGNOSIS — G47.00 INSOMNIA, UNSPECIFIED TYPE: ICD-10-CM

## 2022-09-12 DIAGNOSIS — D70.1 CHEMOTHERAPY-INDUCED NEUTROPENIA (HCC): ICD-10-CM

## 2022-09-12 LAB
ALBUMIN SERPL-MCNC: 3.6 G/DL (ref 3.5–5)
ALBUMIN/GLOB SERPL: 1 {RATIO} (ref 1.2–3.5)
ALP SERPL-CCNC: 82 U/L (ref 50–136)
ALT SERPL-CCNC: 33 U/L (ref 12–65)
ANION GAP SERPL CALC-SCNC: 6 MMOL/L (ref 4–13)
AST SERPL-CCNC: 18 U/L (ref 15–37)
BASOPHILS # BLD: 0.1 K/UL (ref 0–0.2)
BASOPHILS NFR BLD: 2 % (ref 0–2)
BILIRUB SERPL-MCNC: 0.4 MG/DL (ref 0.2–1.1)
BUN SERPL-MCNC: 9 MG/DL (ref 6–23)
CALCIUM SERPL-MCNC: 9.2 MG/DL (ref 8.3–10.4)
CHLORIDE SERPL-SCNC: 108 MMOL/L (ref 101–110)
CO2 SERPL-SCNC: 24 MMOL/L (ref 21–32)
CREAT SERPL-MCNC: 1 MG/DL (ref 0.8–1.5)
DIFFERENTIAL METHOD BLD: ABNORMAL
EOSINOPHIL # BLD: 0 K/UL (ref 0–0.8)
EOSINOPHIL NFR BLD: 1 % (ref 0.5–7.8)
ERYTHROCYTE [DISTWIDTH] IN BLOOD BY AUTOMATED COUNT: 14.9 % (ref 11.9–14.6)
GLOBULIN SER CALC-MCNC: 3.5 G/DL (ref 2.3–3.5)
GLUCOSE SERPL-MCNC: 178 MG/DL (ref 65–100)
HCT VFR BLD AUTO: 35.6 %
HGB BLD-MCNC: 12.2 G/DL (ref 13.6–17.2)
IMM GRANULOCYTES # BLD AUTO: 0.6 K/UL (ref 0–0.5)
IMM GRANULOCYTES NFR BLD AUTO: 19 % (ref 0–5)
LYMPHOCYTES # BLD: 0.8 K/UL (ref 0.5–4.6)
LYMPHOCYTES NFR BLD: 27 % (ref 13–44)
MAGNESIUM SERPL-MCNC: 1.7 MG/DL (ref 1.8–2.4)
MCH RBC QN AUTO: 29.5 PG (ref 26.1–32.9)
MCHC RBC AUTO-ENTMCNC: 34.3 G/DL (ref 31.4–35)
MCV RBC AUTO: 86.2 FL (ref 79.6–97.8)
MONOCYTES # BLD: 0.7 K/UL (ref 0.1–1.3)
MONOCYTES NFR BLD: 24 % (ref 4–12)
NEUTS SEG # BLD: 0.9 K/UL (ref 1.7–8.2)
NEUTS SEG NFR BLD: 27 % (ref 43–78)
NRBC # BLD: 0.03 K/UL (ref 0–0.2)
PLATELET # BLD AUTO: 280 K/UL (ref 150–450)
PLATELET COMMENT: ADEQUATE
PMV BLD AUTO: 8.8 FL (ref 9.4–12.3)
POTASSIUM SERPL-SCNC: 4 MMOL/L (ref 3.5–5.1)
PROT SERPL-MCNC: 7.1 G/DL (ref 6.3–8.2)
RBC # BLD AUTO: 4.13 M/UL (ref 4.23–5.6)
RBC MORPH BLD: ABNORMAL
SODIUM SERPL-SCNC: 138 MMOL/L (ref 136–145)
WBC # BLD AUTO: 3.1 K/UL (ref 4.3–11.1)
WBC MORPH BLD: ABNORMAL

## 2022-09-12 PROCEDURE — 85025 COMPLETE CBC W/AUTO DIFF WBC: CPT

## 2022-09-12 PROCEDURE — 83735 ASSAY OF MAGNESIUM: CPT

## 2022-09-12 PROCEDURE — 6370000000 HC RX 637 (ALT 250 FOR IP): Performed by: INTERNAL MEDICINE

## 2022-09-12 PROCEDURE — 6360000002 HC RX W HCPCS: Performed by: INTERNAL MEDICINE

## 2022-09-12 PROCEDURE — 80053 COMPREHEN METABOLIC PANEL: CPT

## 2022-09-12 PROCEDURE — 96375 TX/PRO/DX INJ NEW DRUG ADDON: CPT

## 2022-09-12 PROCEDURE — 96409 CHEMO IV PUSH SNGL DRUG: CPT

## 2022-09-12 PROCEDURE — 99215 OFFICE O/P EST HI 40 MIN: CPT | Performed by: INTERNAL MEDICINE

## 2022-09-12 PROCEDURE — 36591 DRAW BLOOD OFF VENOUS DEVICE: CPT

## 2022-09-12 PROCEDURE — 2580000003 HC RX 258: Performed by: INTERNAL MEDICINE

## 2022-09-12 RX ORDER — SODIUM CHLORIDE 9 MG/ML
5-250 INJECTION, SOLUTION INTRAVENOUS PRN
Status: DISCONTINUED | OUTPATIENT
Start: 2022-09-12 | End: 2022-09-13 | Stop reason: HOSPADM

## 2022-09-12 RX ORDER — SODIUM CHLORIDE 0.9 % (FLUSH) 0.9 %
10 SYRINGE (ML) INJECTION PRN
Status: DISCONTINUED | OUTPATIENT
Start: 2022-09-12 | End: 2022-09-13 | Stop reason: HOSPADM

## 2022-09-12 RX ORDER — ACETAMINOPHEN 325 MG/1
650 TABLET ORAL ONCE
Status: COMPLETED | OUTPATIENT
Start: 2022-09-12 | End: 2022-09-12

## 2022-09-12 RX ORDER — DIPHENHYDRAMINE HYDROCHLORIDE 50 MG/ML
25 INJECTION INTRAMUSCULAR; INTRAVENOUS ONCE
Status: COMPLETED | OUTPATIENT
Start: 2022-09-12 | End: 2022-09-12

## 2022-09-12 RX ORDER — SODIUM CHLORIDE 0.9 % (FLUSH) 0.9 %
5-40 SYRINGE (ML) INJECTION PRN
Status: DISCONTINUED | OUTPATIENT
Start: 2022-09-12 | End: 2022-09-13 | Stop reason: HOSPADM

## 2022-09-12 RX ADMIN — ACETAMINOPHEN 650 MG: 325 TABLET ORAL at 11:02

## 2022-09-12 RX ADMIN — Medication 10 ML: at 09:21

## 2022-09-12 RX ADMIN — BLEOMYCIN SULFATE 30 UNITS: 30 INJECTION, POWDER, LYOPHILIZED, FOR SOLUTION INTRAMUSCULAR; INTRAPLEURAL; INTRAVENOUS; SUBCUTANEOUS at 11:35

## 2022-09-12 RX ADMIN — SODIUM CHLORIDE 25 ML/HR: 9 INJECTION, SOLUTION INTRAVENOUS at 10:32

## 2022-09-12 RX ADMIN — SODIUM CHLORIDE, PRESERVATIVE FREE 10 ML: 5 INJECTION INTRAVENOUS at 10:32

## 2022-09-12 RX ADMIN — SODIUM CHLORIDE, PRESERVATIVE FREE 10 ML: 5 INJECTION INTRAVENOUS at 11:59

## 2022-09-12 RX ADMIN — DIPHENHYDRAMINE HYDROCHLORIDE 25 MG: 50 INJECTION INTRAMUSCULAR; INTRAVENOUS at 11:02

## 2022-09-12 ASSESSMENT — PATIENT HEALTH QUESTIONNAIRE - PHQ9
SUM OF ALL RESPONSES TO PHQ QUESTIONS 1-9: 0
2. FEELING DOWN, DEPRESSED OR HOPELESS: 0
SUM OF ALL RESPONSES TO PHQ9 QUESTIONS 1 & 2: 0
SUM OF ALL RESPONSES TO PHQ QUESTIONS 1-9: 0
SUM OF ALL RESPONSES TO PHQ QUESTIONS 1-9: 0
1. LITTLE INTEREST OR PLEASURE IN DOING THINGS: 0
SUM OF ALL RESPONSES TO PHQ QUESTIONS 1-9: 0

## 2022-09-12 NOTE — PROGRESS NOTES
9/12/22 saw pt today with Dr. Sailaja Rubalcava for pre chemo c2d15 BEP.  today, will proceed with infusion. He is feeling well. PO intake is good. Proceed to infusion. Encouraged to call with any concerns. Follow up in 1 week. Navigation will continue to follow.

## 2022-09-12 NOTE — PATIENT INSTRUCTIONS
Patient Instructions from Today's Visit    Reason for Visit:  Pre chemo cycle 2 day 15 BEP    Plan:  Proceed to infusion     Follow Up:  1 week    Recent Lab Results:  Hospital Outpatient Visit on 09/12/2022   Component Date Value Ref Range Status    WBC 09/12/2022 3.1 (A) 4.3 - 11.1 K/uL Final    Comment: RESULTS CHECKED X 2  PERIPHERAL REVIEW TO FOLLOW      RBC 09/12/2022 4.13 (A) 4.23 - 5.6 M/uL Final    Hemoglobin 09/12/2022 12.2 (A) 13.6 - 17.2 g/dL Final    Hematocrit 09/12/2022 35.6  % Final    MCV 09/12/2022 86.2  79.6 - 97.8 FL Final    MCH 09/12/2022 29.5  26.1 - 32.9 PG Final    MCHC 09/12/2022 34.3  31.4 - 35.0 g/dL Final    RDW 09/12/2022 14.9 (A) 11.9 - 14.6 % Final    Platelets 05/39/9404 280  150 - 450 K/uL Final    MPV 09/12/2022 8.8 (A) 9.4 - 12.3 FL Final    nRBC 09/12/2022 0.03  0.0 - 0.2 K/uL Final    **Note: Absolute NRBC parameter is now reported with Hemogram**    Seg Neutrophils 09/12/2022 27 (A) 43 - 78 % Final    Lymphocytes 09/12/2022 27  13 - 44 % Final    Monocytes 09/12/2022 24 (A) 4.0 - 12.0 % Final    Eosinophils % 09/12/2022 1  0.5 - 7.8 % Final    Basophils 09/12/2022 2  0.0 - 2.0 % Final    Immature Granulocytes 09/12/2022 19 (A) 0.0 - 5.0 % Final    Segs Absolute 09/12/2022 0.9 (A) 1.7 - 8.2 K/UL Final    Absolute Lymph # 09/12/2022 0.8  0.5 - 4.6 K/UL Final    Absolute Mono # 09/12/2022 0.7  0.1 - 1.3 K/UL Final    Absolute Eos # 09/12/2022 0.0  0.0 - 0.8 K/UL Final    Basophils Absolute 09/12/2022 0.1  0.0 - 0.2 K/UL Final    Absolute Immature Granulocyte 09/12/2022 0.6 (A) 0.0 - 0.5 K/UL Final    RBC Comment 09/12/2022     Final                    Value:SLIGHT  ANISOCYTOSIS + POIKILOCYTOSIS      RBC Comment 09/12/2022     Final                    Value:SLIGHT  POLYCHROMASIA      RBC Comment 09/12/2022 NRBC'S PRESENT   Final    WBC Comment 09/12/2022 OCCASIONAL    Final    Comment: ATYPICAL LYMPHOCYTES PRESENT  Result Confirmed By Smear  WBC'S APPEAR ABNORMAL/IMMATURE/ATYPICAL      Platelet Comment 68/90/6517 ADEQUATE    Final    Comment: OCCASIONAL  LARGE FORMS PRESENT      Differential Type 09/12/2022 AUTOMATED    Final    Sodium 09/12/2022 138  136 - 145 mmol/L Final    Potassium 09/12/2022 4.0  3.5 - 5.1 mmol/L Final    Chloride 09/12/2022 108  101 - 110 mmol/L Final    CO2 09/12/2022 24  21 - 32 mmol/L Final    Anion Gap 09/12/2022 6  4 - 13 mmol/L Final    Glucose 09/12/2022 178 (A) 65 - 100 mg/dL Final    BUN 09/12/2022 9  6 - 23 MG/DL Final    Creatinine 09/12/2022 1.00  0.8 - 1.5 MG/DL Final    GFR  09/12/2022 >60  >60 ml/min/1.73m2 Final    GFR Non- 09/12/2022 >60  >60 ml/min/1.73m2 Final    Comment:      Estimated GFR is calculated using the Modification of Diet in Renal Disease (MDRD) Study equation, reported for both  Americans (GFRAA) and non- Americans (GFRNA), and normalized to 1.73m2 body surface area. The physician must decide which value applies to the patient. The MDRD study equation should only be used in individuals age 25 or older. It has not been validated for the following: pregnant women, patients with serious comorbid conditions,or on certain medications, or persons with extremes of body size, muscle mass, or nutritional status.       Calcium 09/12/2022 9.2  8.3 - 10.4 MG/DL Final    Total Bilirubin 09/12/2022 0.4  0.2 - 1.1 MG/DL Final    ALT 09/12/2022 33  12 - 65 U/L Final    AST 09/12/2022 18  15 - 37 U/L Final    Alk Phosphatase 09/12/2022 82  50 - 136 U/L Final    Total Protein 09/12/2022 7.1  6.3 - 8.2 g/dL Final    Albumin 09/12/2022 3.6  3.5 - 5.0 g/dL Final    Globulin 09/12/2022 3.5  2.3 - 3.5 g/dL Final    Albumin/Globulin Ratio 09/12/2022 1.0 (A) 1.2 - 3.5   Final    Magnesium 09/12/2022 1.7 (A) 1.8 - 2.4 mg/dL Final         Treatment Summary has been discussed and given to patient: no        -------------------------------------------------------------------------------------------------------------------  Please call our office at [phone] if you have any  of the following symptoms:   Fever of 100.5 or greater  Chills  Shortness of breath  Swelling or pain in one leg    After office hours an answering service is available and will contact a provider for emergencies or if you are experiencing any of the above symptoms. Patient did express an interest in My Chart. My Chart log in information explained on the after visit summary printout at the [facility] [address] desk.     Rojelio Horvath, RN, BSN  Nurse Navigator  [phone] live [email or web address]

## 2022-09-12 NOTE — PROGRESS NOTES
Arrived to the [facility]. Bleomycin completed. Patient tolerated well. Any issues or concerns during appointment: ANC 0.9 today. Order obtained to proceed with Bleomycin today. .  Patient aware of next infusion appointment on 9/19 (date) at 80 (time). Patient aware of next lab and [facility] office visit on 9/19 (date) at 56 (time). Patient instructed to call provider with temperature of 100.4 or greater or nausea/vomiting/ diarrhea or pain not controlled by medications  Discharged ambulatory in stable condition.

## 2022-09-12 NOTE — PROGRESS NOTES
3 [facility] Hematology & Oncology: Office Visit Progress Note    Chief Complaint:    Testicular cancer    History of Present Illness:  38 y.o. male past medical history of hypertension, hyperlipidemia, diabetes, presented to the emergency room with testicle swelling and lower back and abdominal pain. Reported low back pain intermittently over the past month, did not have work-up yet. Now he noticed testicular swelling with pain and presented to the emergency room for further evaluation. Scrotal ultrasound showed large left side testicular mass highly concerning for testicular malignancy measuring 4.4 x 3.7 x 3.9 cm. CT showed massive retroperitoneal lymph nodes conglomerates and a largest retrocrural lymph nodes suspicious for lymphoma versus metastatic disease. There is severe luminal narrowing of the IVC given the mass-effect upon the IVC and mass-effect upon the abdominal aorta as well as the iliopsoas musculature. Oncology is consulted for recommendation. 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 Review of Systems:  Constitutional Anorexia, fatigue. Weight loss. Denies fever or chills. HEENT Denies trauma, bluring vision, hearing loss, ear pain, nosebleeds, sore throat, neck pain and ear discharge. Skin Rash on scalp. Denies rashes. Lungs Denies shortness of breath, cough, sputum production or hemoptysis. Cardiovascular Denies, palpitations, orthopnea, claudication and leg swelling. Gastrointestinal Denies nausea, vomiting, bowel changes. Denies bloody or black stools. Denies abdominal pain.  Status post left orchiectomy. Denies dysuria, frequency or hesitancy of urination   Neuro Denies headaches, visual changes or ataxia. Denies dizziness, tingling, tremors, sensory change, speech change, focal weakness and headaches.      Hematology Denies nasal/gum bleeding, denies easy bruise Endo Denies heat/cold intolerance, denies diabetes. MSK Back pain. Denies swollen legs, myalgias and falls. Psychiatric/Behavioral Denies depression and substance abuse. The patient is not nervous/anxious. No Known Allergies  Past Medical History:   Diagnosis Date    Benign essential hypertension     controlled with med    Diabetes mellitus type 2, noninsulin dependent ([facility] [address]     oral reliant; AVG -105; pt does have s.s of hypoglycemia but does not know what BS is when it gets low; last A1C    Fatty liver     per pt    Hyperlipidemia     managed with med    Testicular mass     Left     Past Surgical History:   Procedure Laterality Date    APPENDECTOMY      IR PORT PLACEMENT EQUAL OR GREATER THAN 5 YEARS  7/22/2022    IR PORT PLACEMENT EQUAL OR GREATER THAN 5 YEARS 7/22/2022 [facility] SPECIALS    KIDNEY STONE SURGERY Right 2011    TESTICLE REMOVAL Left 7/7/2022    LEFT RADICAL ORCHIECTOMY Larraine Sella APPROACH performed by Jamie Bowles MD at [facility] MAIN OR     History reviewed. No pertinent family history.   Social History     Socioeconomic History    Marital status:      Spouse name: Not on file    Number of children: Not on file    Years of education: Not on file    Highest education level: Not on file   Occupational History    Not on file   Tobacco Use    Smoking status: Never    Smokeless tobacco: Never   Vaping Use    Vaping Use: Never used   Substance and Sexual Activity    Alcohol use: Yes     Comment: one or two per week    Drug use: Never    Sexual activity: Not on file   Other Topics Concern    Not on file   Social History Narrative    Not on file     Social Determinants of Health     Financial Resource Strain: Not on file   Food Insecurity: Not on file   Transportation Needs: Not on file   Physical Activity: Not on file   Stress: Not on file   Social Connections: Not on file   Intimate Partner Violence: Not on file   Housing Stability: Not on file     Current Outpatient Medications   Medication Sig Dispense Refill    hydrocortisone (ANUSOL-HC) 2.5 % CREA rectal cream Apply 2-3 times a day 28 g 1    lidocaine-prilocaine (EMLA) 2.5-2.5 % cream Apply topically as needed. 1 each 2    ondansetron (ZOFRAN-ODT) 8 MG TBDP disintegrating tablet Place 1 tablet under the tongue every 8 hours as needed for Nausea or Vomiting 90 tablet 2    prochlorperazine (COMPAZINE) 10 MG tablet Take 1 tablet by mouth every 6 hours as needed (cinv) 90 tablet 2    telmisartan-amLODIPine (TWYNSTA) 80-5 MG TABS tablet Take 1 tablet by mouth daily      atenolol (TENORMIN) 50 MG tablet Take 50 mg by mouth daily      atorvastatin (LIPITOR) 20 MG tablet Take 20 mg by mouth nightly      metFORMIN (GLUCOPHAGE-XR) 500 MG extended release tablet Take 2 with breakfast and supper - Diabetes      spironolactone (ALDACTONE) 50 MG tablet Take 50 mg by mouth daily      metroNIDAZOLE (METROGEL) 0.75 % gel Apply topically 2 times daily. (Patient not taking: Reported on 9/12/2022) 45 g 1     No current facility-administered medications for this visit. Facility-Administered Medications Ordered in Other Visits   Medication Dose Route Frequency Provider Last Rate Last Admin    sodium chloride flush 0.9 % injection 10 mL  10 mL IntraVENous CAROLINA Murphy MD   10 mL at 09/12/22 0921    0.9 % sodium chloride infusion  5-250 mL/hr IntraVENous CAROLINA Murphy MD   Stopped at 09/12/22 1158    sodium chloride flush 0.9 % injection 5-40 mL  5-40 mL IntraVENous CAROLINA Murphy MD   10 mL at 09/12/22 1159       OBJECTIVE:  /75 Comment: standing  Pulse 85   Temp 97.8 °F (36.6 °C) (Oral)   Resp 16   Ht 6' 1\" (1.854 m)   Wt 243 lb 12.8 oz (110.6 kg)   SpO2 97%   BMI 32.17 kg/m²     Physical Exam:  Constitutional: Oriented to person, place, and time. Well-developed and well-nourished. HEENT: Normocephalic and atraumatic.  Oropharynx is clear and moist.   Conjunctivae and EOM are normal. Pupils are equal, round, and reactive to light. No scleral icterus. Neck supple. No JVD present. No tracheal deviation present. No thyromegaly present. Lymph node No palpable submandibular, cervical, supraclavicular, axillary and inguinal lymph nodes. Skin Rash on scalp. Warm and dry. No bruising noted. No erythema. No pallor. Respiratory Effort normal and breath sounds normal.  No respiratory distress. No wheezes. No rales. No tenderness. CVS Normal rate, regular rhythm and normal heart sounds. Exam reveals no gallop, no friction and no rub. No murmur heard. Abdomen Status post left orchiectomy. Right testis soft nontender no mass. Soft. Bowel sounds are normal. Exhibits no distension. There is no tenderness. There is no rebound and no guarding. Neuro Normal reflexes. No cranial nerve deficit. Exhibits normal muscle tone, 5 of 5 strength of all extremities. MSK Normal range of motion in general.  No edema and no tenderness.    Psych Normal mood, affect, behavior, judgment and thought content      Labs:  Recent Results (from the past 24 hour(s))   CBC with Auto Differential    Collection Time: 09/12/22  9:12 AM   Result Value Ref Range    WBC 3.1 (L) 4.3 - 11.1 K/uL    RBC 4.13 (L) 4.23 - 5.6 M/uL    Hemoglobin 12.2 (L) 13.6 - 17.2 g/dL    Hematocrit 35.6 %    MCV 86.2 79.6 - 97.8 FL    MCH 29.5 26.1 - 32.9 PG    MCHC 34.3 31.4 - 35.0 g/dL    RDW 14.9 (H) 11.9 - 14.6 %    Platelets 325 994 - 784 K/uL    MPV 8.8 (L) 9.4 - 12.3 FL    nRBC 0.03 0.0 - 0.2 K/uL    Seg Neutrophils 27 (L) 43 - 78 %    Lymphocytes 27 13 - 44 %    Monocytes 24 (H) 4.0 - 12.0 %    Eosinophils % 1 0.5 - 7.8 %    Basophils 2 0.0 - 2.0 %    Immature Granulocytes 19 (H) 0.0 - 5.0 %    Segs Absolute 0.9 (L) 1.7 - 8.2 K/UL    Absolute Lymph # 0.8 0.5 - 4.6 K/UL    Absolute Mono # 0.7 0.1 - 1.3 K/UL    Absolute Eos # 0.0 0.0 - 0.8 K/UL    Basophils Absolute 0.1 0.0 - 0.2 K/UL    Absolute Immature Granulocyte 0.6 (H) 0.0 - 0.5 K/UL RBC Comment SLIGHT  ANISOCYTOSIS + POIKILOCYTOSIS        RBC Comment SLIGHT  POLYCHROMASIA        RBC Comment NRBC'S PRESENT     WBC Comment OCCASIONAL      Platelet Comment ADEQUATE      Differential Type AUTOMATED     Comprehensive Metabolic Panel    Collection Time: 09/12/22  9:12 AM   Result Value Ref Range    Sodium 138 136 - 145 mmol/L    Potassium 4.0 3.5 - 5.1 mmol/L    Chloride 108 101 - 110 mmol/L    CO2 24 21 - 32 mmol/L    Anion Gap 6 4 - 13 mmol/L    Glucose 178 (H) 65 - 100 mg/dL    BUN 9 6 - 23 MG/DL    Creatinine 1.00 0.8 - 1.5 MG/DL    GFR African American >60 >60 ml/min/1.73m2    GFR Non- >60 >60 ml/min/1.73m2    Calcium 9.2 8.3 - 10.4 MG/DL    Total Bilirubin 0.4 0.2 - 1.1 MG/DL    ALT 33 12 - 65 U/L    AST 18 15 - 37 U/L    Alk Phosphatase 82 50 - 136 U/L    Total Protein 7.1 6.3 - 8.2 g/dL    Albumin 3.6 3.5 - 5.0 g/dL    Globulin 3.5 2.3 - 3.5 g/dL    Albumin/Globulin Ratio 1.0 (L) 1.2 - 3.5     Magnesium    Collection Time: 09/12/22  9:12 AM   Result Value Ref Range    Magnesium 1.7 (L) 1.8 - 2.4 mg/dL           Imaging:  No results found for this or any previous visit. ASSESSMENT/PLAN:   Diagnosis Orders   1. Seminoma (HCC)  CBC with Auto Differential    Comprehensive Metabolic Panel    AFP Tumor Marker    HCG, Quantitative, Pregnancy    Lactate Dehydrogenase      2. High risk medication use        3. Insomnia, unspecified type        4.  Chemotherapy-induced neutropenia ([address]              [address] y.o. 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, he need tumor markers for further diagnosis, patient and wife are tearful and discussed that this was a potentially rather curable cancer, typically would pursue inguinal radical orchiectomy for diagnosis and local disease control, nonetheless he is a typical case radiographically if beta-hCG is highly elevated, and the severe narrowing of IVC is of clinical concern of complication unless chemotherapy is started soon, discussed with uroncology and may start chemo urgently if needed, discussed options with patient and he agrees with starting chemotherapy as soon as possible to avoid further complication, elevated beta-hCG and normal AFP consistent with seminoma, completed sperm banking and received a left radical inguinal orchiectomy on 7/7/2022 and the pathology showed pure seminoma, followed in office on 7/19/2022 and we discussed the result, beta-hCG and LDH increasing rapidly despite of the resection, increased back pain and night sweat, placed port and start BEP x3 7/25/2022, return on 8/1/2022, hCG down to 82, however not feeling well and lost 14 pounds in the week, not short of breath but reports some atypical chest pain that lasts for seconds each time, tachycardic and blood pressure lower than baseline, stat CT chest to rule out PE, arrange IV fluid for dehydration and received a day 8 bleomycin, return 8/8/2022, chest pain and short of breath resolved, but having mucositis with difficulty eating, prescribed Peridex and Magic mouthwash, discussed ways to improve nutrition and hydration, return on 9/12/2022, felt better after a week of delay due to neutropenia, ANC still 0.9 today, discussed the risk and desire to proceed with cycle 2-day 15 bleomycin, hemorrhoid responded to Anusol, rash responded to Flagyl cream, Ambien as needed for insomnia, return next week but call as needed. Baseline PFT WNL. All questions are answered to their satisfaction. They will call for further questions and concerns.     ECOG PERFORMANCE STATUS - 0-Fully active, able to carry on all pre-disease performance without restriction. Pain - 0 - No pain/10. Mild to moderate pain, requiring medication - see MAR     Fatigue - No flowsheet data found. Distress - No flowsheet data found. Total time independently spent on today's visit was 40min. This time included: face-to-face time evaluating the patient as well as additional non-face-to-face time spent on: Preparing to see the patient by obtaining and reviewing previous test results, records and medical history, Performing a medically appropriate history and exam and documenting relevant clinical information for this visit, Counseling and educating patient and family, Ordering medications, Communicating with other health care professionals and Referring patient to another health care provider. Elements of this note have been dictated via voice recognition software. Text and phrases may be limited by the accuracy and autoconversion of the software. The chart has been reviewed, but errors may still be present. Amelia Agrawal M.D.   [address]  Office : [phone]  Fax : [phone]

## 2022-09-19 ENCOUNTER — HOSPITAL ENCOUNTER (OUTPATIENT)
Dept: INFUSION THERAPY | Age: 42
Discharge: HOME | End: 2022-09-19
Payer: COMMERCIAL

## 2022-09-19 ENCOUNTER — OFFICE VISIT (OUTPATIENT)
Dept: ONCOLOGY | Age: 42
End: 2022-09-19
Payer: COMMERCIAL

## 2022-09-19 ENCOUNTER — CLINICAL DOCUMENTATION (OUTPATIENT)
Dept: CASE MANAGEMENT | Age: 42
End: 2022-09-19

## 2022-09-19 VITALS
SYSTOLIC BLOOD PRESSURE: 117 MMHG | HEIGHT: 73 IN | HEART RATE: 67 BPM | DIASTOLIC BLOOD PRESSURE: 74 MMHG | BODY MASS INDEX: 32.51 KG/M2 | TEMPERATURE: 97.6 F | WEIGHT: 245.3 LBS | RESPIRATION RATE: 14 BRPM | OXYGEN SATURATION: 98 %

## 2022-09-19 DIAGNOSIS — G47.00 INSOMNIA, UNSPECIFIED TYPE: ICD-10-CM

## 2022-09-19 DIAGNOSIS — R21 RASH: ICD-10-CM

## 2022-09-19 DIAGNOSIS — C62.90 SEMINOMA (HCC): ICD-10-CM

## 2022-09-19 DIAGNOSIS — C62.90 SEMINOMA (HCC): Primary | ICD-10-CM

## 2022-09-19 DIAGNOSIS — R11.0 NAUSEA: ICD-10-CM

## 2022-09-19 DIAGNOSIS — Z79.899 HIGH RISK MEDICATION USE: ICD-10-CM

## 2022-09-19 LAB
AFP-TM SERPL-MCNC: 3 NG/ML
ALBUMIN SERPL-MCNC: 3.7 G/DL (ref 3.5–5)
ALBUMIN/GLOB SERPL: 1 {RATIO} (ref 1.2–3.5)
ALP SERPL-CCNC: 78 U/L (ref 50–136)
ALT SERPL-CCNC: 34 U/L (ref 12–65)
ANION GAP SERPL CALC-SCNC: 8 MMOL/L (ref 4–13)
AST SERPL-CCNC: 17 U/L (ref 15–37)
BASOPHILS # BLD: 0 K/UL (ref 0–0.2)
BASOPHILS NFR BLD: 0 % (ref 0–2)
BILIRUB SERPL-MCNC: 0.5 MG/DL (ref 0.2–1.1)
BUN SERPL-MCNC: 12 MG/DL (ref 6–23)
CALCIUM SERPL-MCNC: 9.2 MG/DL (ref 8.3–10.4)
CHLORIDE SERPL-SCNC: 105 MMOL/L (ref 101–110)
CO2 SERPL-SCNC: 24 MMOL/L (ref 21–32)
CREAT SERPL-MCNC: 1.1 MG/DL (ref 0.8–1.5)
DIFFERENTIAL METHOD BLD: ABNORMAL
EOSINOPHIL # BLD: 0 K/UL (ref 0–0.8)
EOSINOPHIL NFR BLD: 0 % (ref 0.5–7.8)
ERYTHROCYTE [DISTWIDTH] IN BLOOD BY AUTOMATED COUNT: 16.3 % (ref 11.9–14.6)
GLOBULIN SER CALC-MCNC: 3.6 G/DL (ref 2.3–3.5)
GLUCOSE SERPL-MCNC: 211 MG/DL (ref 65–100)
HCG SERPL-ACNC: <1 MIU/ML (ref 0–2)
HCT VFR BLD AUTO: 37 %
HGB BLD-MCNC: 12.6 G/DL (ref 13.6–17.2)
IMM GRANULOCYTES # BLD AUTO: 0.9 K/UL (ref 0–0.5)
IMM GRANULOCYTES NFR BLD AUTO: 11 % (ref 0–5)
LDH SERPL L TO P-CCNC: 232 U/L (ref 100–190)
LYMPHOCYTES # BLD: 1.1 K/UL (ref 0.5–4.6)
LYMPHOCYTES NFR BLD: 14 % (ref 13–44)
MCH RBC QN AUTO: 29.3 PG (ref 26.1–32.9)
MCHC RBC AUTO-ENTMCNC: 34.1 G/DL (ref 31.4–35)
MCV RBC AUTO: 86 FL (ref 79.6–97.8)
MONOCYTES # BLD: 1 K/UL (ref 0.1–1.3)
MONOCYTES NFR BLD: 13 % (ref 4–12)
NEUTS SEG # BLD: 4.9 K/UL (ref 1.7–8.2)
NEUTS SEG NFR BLD: 62 % (ref 43–78)
NRBC # BLD: 0.03 K/UL (ref 0–0.2)
PLATELET # BLD AUTO: 334 K/UL (ref 150–450)
PLATELET COMMENT: ADEQUATE
PMV BLD AUTO: 8.8 FL (ref 9.4–12.3)
POTASSIUM SERPL-SCNC: 4.1 MMOL/L (ref 3.5–5.1)
PROT SERPL-MCNC: 7.3 G/DL (ref 6.3–8.2)
RBC # BLD AUTO: 4.3 M/UL (ref 4.23–5.6)
RBC MORPH BLD: ABNORMAL
SODIUM SERPL-SCNC: 137 MMOL/L (ref 136–145)
WBC # BLD AUTO: 7.9 K/UL (ref 4.3–11.1)
WBC MORPH BLD: ABNORMAL

## 2022-09-19 PROCEDURE — 6360000002 HC RX W HCPCS: Performed by: INTERNAL MEDICINE

## 2022-09-19 PROCEDURE — 96413 CHEMO IV INFUSION 1 HR: CPT

## 2022-09-19 PROCEDURE — 96417 CHEMO IV INFUS EACH ADDL SEQ: CPT

## 2022-09-19 PROCEDURE — 96366 THER/PROPH/DIAG IV INF ADDON: CPT

## 2022-09-19 PROCEDURE — 36591 DRAW BLOOD OFF VENOUS DEVICE: CPT

## 2022-09-19 PROCEDURE — 2580000003 HC RX 258: Performed by: INTERNAL MEDICINE

## 2022-09-19 PROCEDURE — 6370000000 HC RX 637 (ALT 250 FOR IP): Performed by: INTERNAL MEDICINE

## 2022-09-19 PROCEDURE — 96375 TX/PRO/DX INJ NEW DRUG ADDON: CPT

## 2022-09-19 PROCEDURE — 80053 COMPREHEN METABOLIC PANEL: CPT

## 2022-09-19 PROCEDURE — 96367 TX/PROPH/DG ADDL SEQ IV INF: CPT

## 2022-09-19 PROCEDURE — 99215 OFFICE O/P EST HI 40 MIN: CPT | Performed by: INTERNAL MEDICINE

## 2022-09-19 PROCEDURE — 83615 LACTATE (LD) (LDH) ENZYME: CPT

## 2022-09-19 PROCEDURE — 82105 ALPHA-FETOPROTEIN SERUM: CPT

## 2022-09-19 PROCEDURE — 84702 CHORIONIC GONADOTROPIN TEST: CPT

## 2022-09-19 PROCEDURE — 85025 COMPLETE CBC W/AUTO DIFF WBC: CPT

## 2022-09-19 PROCEDURE — 96411 CHEMO IV PUSH ADDL DRUG: CPT

## 2022-09-19 RX ORDER — MEPERIDINE HYDROCHLORIDE 50 MG/ML
12.5 INJECTION INTRAMUSCULAR; INTRAVENOUS; SUBCUTANEOUS PRN
Status: CANCELLED | OUTPATIENT
Start: 2022-09-20

## 2022-09-19 RX ORDER — SODIUM CHLORIDE 0.9 % (FLUSH) 0.9 %
5-40 SYRINGE (ML) INJECTION PRN
Status: CANCELLED | OUTPATIENT
Start: 2022-09-23

## 2022-09-19 RX ORDER — EPINEPHRINE 1 MG/ML
0.3 INJECTION, SOLUTION, CONCENTRATE INTRAVENOUS PRN
Status: CANCELLED | OUTPATIENT
Start: 2022-09-22

## 2022-09-19 RX ORDER — DIPHENHYDRAMINE HYDROCHLORIDE 50 MG/ML
25 INJECTION INTRAMUSCULAR; INTRAVENOUS ONCE
Status: CANCELLED | OUTPATIENT
Start: 2022-09-19 | End: 2022-09-19

## 2022-09-19 RX ORDER — DIPHENHYDRAMINE HYDROCHLORIDE 50 MG/ML
50 INJECTION INTRAMUSCULAR; INTRAVENOUS
Status: CANCELLED | OUTPATIENT
Start: 2022-10-10

## 2022-09-19 RX ORDER — ALBUTEROL SULFATE 90 UG/1
4 AEROSOL, METERED RESPIRATORY (INHALATION) PRN
Status: CANCELLED | OUTPATIENT
Start: 2022-09-26

## 2022-09-19 RX ORDER — ACETAMINOPHEN 325 MG/1
650 TABLET ORAL ONCE
Status: COMPLETED | OUTPATIENT
Start: 2022-09-19 | End: 2022-09-19

## 2022-09-19 RX ORDER — DIPHENHYDRAMINE HYDROCHLORIDE 50 MG/ML
25 INJECTION INTRAMUSCULAR; INTRAVENOUS ONCE
Status: CANCELLED | OUTPATIENT
Start: 2022-10-10 | End: 2022-10-02

## 2022-09-19 RX ORDER — SODIUM CHLORIDE 9 MG/ML
5-250 INJECTION, SOLUTION INTRAVENOUS PRN
Status: CANCELLED | OUTPATIENT
Start: 2022-09-22

## 2022-09-19 RX ORDER — SODIUM CHLORIDE 9 MG/ML
INJECTION, SOLUTION INTRAVENOUS CONTINUOUS
Status: CANCELLED | OUTPATIENT
Start: 2022-09-21

## 2022-09-19 RX ORDER — HEPARIN SODIUM (PORCINE) LOCK FLUSH IV SOLN 100 UNIT/ML 100 UNIT/ML
500 SOLUTION INTRAVENOUS PRN
Status: CANCELLED | OUTPATIENT
Start: 2022-09-22

## 2022-09-19 RX ORDER — EPINEPHRINE 1 MG/ML
0.3 INJECTION, SOLUTION, CONCENTRATE INTRAVENOUS PRN
Status: CANCELLED | OUTPATIENT
Start: 2022-09-21

## 2022-09-19 RX ORDER — DIPHENHYDRAMINE HYDROCHLORIDE 50 MG/ML
50 INJECTION INTRAMUSCULAR; INTRAVENOUS
Status: CANCELLED | OUTPATIENT
Start: 2022-09-22

## 2022-09-19 RX ORDER — SODIUM CHLORIDE 9 MG/ML
5-40 INJECTION INTRAVENOUS PRN
Status: CANCELLED | OUTPATIENT
Start: 2022-09-20

## 2022-09-19 RX ORDER — ONDANSETRON 2 MG/ML
8 INJECTION INTRAMUSCULAR; INTRAVENOUS
Status: CANCELLED | OUTPATIENT
Start: 2022-09-20

## 2022-09-19 RX ORDER — ONDANSETRON 2 MG/ML
8 INJECTION INTRAMUSCULAR; INTRAVENOUS
Status: CANCELLED | OUTPATIENT
Start: 2022-10-10

## 2022-09-19 RX ORDER — ONDANSETRON 2 MG/ML
8 INJECTION INTRAMUSCULAR; INTRAVENOUS
Status: CANCELLED | OUTPATIENT
Start: 2022-09-23

## 2022-09-19 RX ORDER — SODIUM CHLORIDE 9 MG/ML
5-250 INJECTION, SOLUTION INTRAVENOUS PRN
Status: CANCELLED | OUTPATIENT
Start: 2022-10-10

## 2022-09-19 RX ORDER — SODIUM CHLORIDE 9 MG/ML
5-250 INJECTION, SOLUTION INTRAVENOUS PRN
Status: CANCELLED | OUTPATIENT
Start: 2022-09-26

## 2022-09-19 RX ORDER — DIPHENHYDRAMINE HYDROCHLORIDE 50 MG/ML
50 INJECTION INTRAMUSCULAR; INTRAVENOUS
Status: ACTIVE | OUTPATIENT
Start: 2022-09-19 | End: 2022-09-19

## 2022-09-19 RX ORDER — ACETAMINOPHEN 325 MG/1
650 TABLET ORAL ONCE
Status: CANCELLED | OUTPATIENT
Start: 2022-10-10 | End: 2022-10-02

## 2022-09-19 RX ORDER — ALBUTEROL SULFATE 90 UG/1
4 AEROSOL, METERED RESPIRATORY (INHALATION) PRN
Status: CANCELLED | OUTPATIENT
Start: 2022-09-21

## 2022-09-19 RX ORDER — FAMOTIDINE 10 MG/ML
20 INJECTION, SOLUTION INTRAVENOUS
Status: CANCELLED | OUTPATIENT
Start: 2022-09-19

## 2022-09-19 RX ORDER — ACETAMINOPHEN 325 MG/1
650 TABLET ORAL
Status: CANCELLED | OUTPATIENT
Start: 2022-09-23

## 2022-09-19 RX ORDER — MEPERIDINE HYDROCHLORIDE 50 MG/ML
12.5 INJECTION INTRAMUSCULAR; INTRAVENOUS; SUBCUTANEOUS PRN
Status: CANCELLED | OUTPATIENT
Start: 2022-09-23

## 2022-09-19 RX ORDER — EPINEPHRINE 1 MG/ML
0.3 INJECTION, SOLUTION, CONCENTRATE INTRAVENOUS PRN
Status: CANCELLED | OUTPATIENT
Start: 2022-09-26

## 2022-09-19 RX ORDER — ONDANSETRON 2 MG/ML
8 INJECTION INTRAMUSCULAR; INTRAVENOUS ONCE
Status: CANCELLED | OUTPATIENT
Start: 2022-09-20 | End: 2022-09-19

## 2022-09-19 RX ORDER — ACETAMINOPHEN 325 MG/1
650 TABLET ORAL
Status: CANCELLED | OUTPATIENT
Start: 2022-09-26

## 2022-09-19 RX ORDER — SODIUM CHLORIDE 9 MG/ML
5-250 INJECTION, SOLUTION INTRAVENOUS PRN
Status: CANCELLED | OUTPATIENT
Start: 2022-09-21

## 2022-09-19 RX ORDER — ONDANSETRON 2 MG/ML
8 INJECTION INTRAMUSCULAR; INTRAVENOUS
Status: CANCELLED | OUTPATIENT
Start: 2022-09-19

## 2022-09-19 RX ORDER — ACETAMINOPHEN 325 MG/1
650 TABLET ORAL
Status: CANCELLED | OUTPATIENT
Start: 2022-09-19

## 2022-09-19 RX ORDER — SODIUM CHLORIDE 0.9 % (FLUSH) 0.9 %
5-40 SYRINGE (ML) INJECTION PRN
Status: CANCELLED | OUTPATIENT
Start: 2022-09-21

## 2022-09-19 RX ORDER — ONDANSETRON 2 MG/ML
8 INJECTION INTRAMUSCULAR; INTRAVENOUS
Status: CANCELLED | OUTPATIENT
Start: 2022-09-22

## 2022-09-19 RX ORDER — SODIUM CHLORIDE 9 MG/ML
INJECTION, SOLUTION INTRAVENOUS CONTINUOUS
Status: CANCELLED | OUTPATIENT
Start: 2022-09-19

## 2022-09-19 RX ORDER — SODIUM CHLORIDE 9 MG/ML
5-40 INJECTION INTRAVENOUS PRN
Status: CANCELLED | OUTPATIENT
Start: 2022-09-19

## 2022-09-19 RX ORDER — SODIUM CHLORIDE 9 MG/ML
5-250 INJECTION, SOLUTION INTRAVENOUS PRN
Status: CANCELLED | OUTPATIENT
Start: 2022-09-19

## 2022-09-19 RX ORDER — ONDANSETRON 2 MG/ML
8 INJECTION INTRAMUSCULAR; INTRAVENOUS ONCE
Status: CANCELLED | OUTPATIENT
Start: 2022-09-22 | End: 2022-09-21

## 2022-09-19 RX ORDER — FAMOTIDINE 10 MG/ML
20 INJECTION, SOLUTION INTRAVENOUS
Status: CANCELLED | OUTPATIENT
Start: 2022-09-20

## 2022-09-19 RX ORDER — ALBUTEROL SULFATE 90 UG/1
4 AEROSOL, METERED RESPIRATORY (INHALATION) PRN
Status: CANCELLED | OUTPATIENT
Start: 2022-09-20

## 2022-09-19 RX ORDER — DIPHENHYDRAMINE HYDROCHLORIDE 50 MG/ML
25 INJECTION INTRAMUSCULAR; INTRAVENOUS ONCE
Status: CANCELLED | OUTPATIENT
Start: 2022-09-26 | End: 2022-09-25

## 2022-09-19 RX ORDER — FAMOTIDINE 10 MG/ML
20 INJECTION, SOLUTION INTRAVENOUS
Status: CANCELLED | OUTPATIENT
Start: 2022-10-10

## 2022-09-19 RX ORDER — ALBUTEROL SULFATE 90 UG/1
4 AEROSOL, METERED RESPIRATORY (INHALATION) PRN
Status: CANCELLED | OUTPATIENT
Start: 2022-10-10

## 2022-09-19 RX ORDER — SODIUM CHLORIDE 9 MG/ML
5-40 INJECTION INTRAVENOUS PRN
Status: CANCELLED | OUTPATIENT
Start: 2022-09-22

## 2022-09-19 RX ORDER — ACETAMINOPHEN 325 MG/1
650 TABLET ORAL
Status: CANCELLED | OUTPATIENT
Start: 2022-09-21

## 2022-09-19 RX ORDER — FAMOTIDINE 10 MG/ML
20 INJECTION, SOLUTION INTRAVENOUS
Status: CANCELLED | OUTPATIENT
Start: 2022-09-26

## 2022-09-19 RX ORDER — SODIUM CHLORIDE 9 MG/ML
INJECTION, SOLUTION INTRAVENOUS CONTINUOUS
Status: CANCELLED | OUTPATIENT
Start: 2022-09-23

## 2022-09-19 RX ORDER — EPINEPHRINE 1 MG/ML
0.3 INJECTION, SOLUTION, CONCENTRATE INTRAVENOUS PRN
Status: CANCELLED | OUTPATIENT
Start: 2022-09-19

## 2022-09-19 RX ORDER — SODIUM CHLORIDE 9 MG/ML
5-40 INJECTION INTRAVENOUS PRN
Status: CANCELLED | OUTPATIENT
Start: 2022-09-21

## 2022-09-19 RX ORDER — ONDANSETRON 2 MG/ML
8 INJECTION INTRAMUSCULAR; INTRAVENOUS
Status: CANCELLED | OUTPATIENT
Start: 2022-09-21

## 2022-09-19 RX ORDER — ALBUTEROL SULFATE 90 UG/1
4 AEROSOL, METERED RESPIRATORY (INHALATION) PRN
Status: CANCELLED | OUTPATIENT
Start: 2022-09-22

## 2022-09-19 RX ORDER — SODIUM CHLORIDE 9 MG/ML
INJECTION, SOLUTION INTRAVENOUS CONTINUOUS
Status: CANCELLED | OUTPATIENT
Start: 2022-09-22

## 2022-09-19 RX ORDER — MEPERIDINE HYDROCHLORIDE 50 MG/ML
12.5 INJECTION INTRAMUSCULAR; INTRAVENOUS; SUBCUTANEOUS PRN
Status: CANCELLED | OUTPATIENT
Start: 2022-09-19

## 2022-09-19 RX ORDER — SODIUM CHLORIDE 0.9 % (FLUSH) 0.9 %
10 SYRINGE (ML) INJECTION PRN
Status: DISCONTINUED | OUTPATIENT
Start: 2022-09-19 | End: 2022-09-20 | Stop reason: HOSPADM

## 2022-09-19 RX ORDER — HEPARIN SODIUM (PORCINE) LOCK FLUSH IV SOLN 100 UNIT/ML 100 UNIT/ML
500 SOLUTION INTRAVENOUS PRN
Status: CANCELLED | OUTPATIENT
Start: 2022-09-23

## 2022-09-19 RX ORDER — MEPERIDINE HYDROCHLORIDE 50 MG/ML
12.5 INJECTION INTRAMUSCULAR; INTRAVENOUS; SUBCUTANEOUS PRN
Status: CANCELLED | OUTPATIENT
Start: 2022-09-22

## 2022-09-19 RX ORDER — SODIUM CHLORIDE 0.9 % (FLUSH) 0.9 %
5-40 SYRINGE (ML) INJECTION PRN
Status: CANCELLED | OUTPATIENT
Start: 2022-10-10

## 2022-09-19 RX ORDER — HEPARIN SODIUM (PORCINE) LOCK FLUSH IV SOLN 100 UNIT/ML 100 UNIT/ML
500 SOLUTION INTRAVENOUS PRN
Status: CANCELLED | OUTPATIENT
Start: 2022-09-20

## 2022-09-19 RX ORDER — SODIUM CHLORIDE 9 MG/ML
5-40 INJECTION INTRAVENOUS PRN
Status: CANCELLED | OUTPATIENT
Start: 2022-09-26

## 2022-09-19 RX ORDER — EPINEPHRINE 1 MG/ML
0.3 INJECTION, SOLUTION, CONCENTRATE INTRAVENOUS PRN
Status: CANCELLED | OUTPATIENT
Start: 2022-09-23

## 2022-09-19 RX ORDER — ACETAMINOPHEN 325 MG/1
650 TABLET ORAL
Status: CANCELLED | OUTPATIENT
Start: 2022-09-20

## 2022-09-19 RX ORDER — DIPHENHYDRAMINE HYDROCHLORIDE 50 MG/ML
25 INJECTION INTRAMUSCULAR; INTRAVENOUS ONCE
Status: COMPLETED | OUTPATIENT
Start: 2022-09-19 | End: 2022-09-19

## 2022-09-19 RX ORDER — SODIUM CHLORIDE 9 MG/ML
5-40 INJECTION INTRAVENOUS PRN
Status: CANCELLED | OUTPATIENT
Start: 2022-09-23

## 2022-09-19 RX ORDER — ALBUTEROL SULFATE 90 UG/1
4 AEROSOL, METERED RESPIRATORY (INHALATION) PRN
Status: CANCELLED | OUTPATIENT
Start: 2022-09-19

## 2022-09-19 RX ORDER — ACETAMINOPHEN 325 MG/1
650 TABLET ORAL
Status: CANCELLED | OUTPATIENT
Start: 2022-09-22

## 2022-09-19 RX ORDER — ONDANSETRON 2 MG/ML
8 INJECTION INTRAMUSCULAR; INTRAVENOUS ONCE
Status: CANCELLED | OUTPATIENT
Start: 2022-09-19 | End: 2022-09-19

## 2022-09-19 RX ORDER — EPINEPHRINE 1 MG/ML
0.3 INJECTION, SOLUTION, CONCENTRATE INTRAVENOUS PRN
Status: CANCELLED | OUTPATIENT
Start: 2022-09-20

## 2022-09-19 RX ORDER — EPINEPHRINE 1 MG/ML
0.3 INJECTION, SOLUTION, CONCENTRATE INTRAVENOUS PRN
Status: CANCELLED | OUTPATIENT
Start: 2022-10-10

## 2022-09-19 RX ORDER — SODIUM CHLORIDE 9 MG/ML
5-250 INJECTION, SOLUTION INTRAVENOUS PRN
Status: CANCELLED | OUTPATIENT
Start: 2022-09-20

## 2022-09-19 RX ORDER — SODIUM CHLORIDE 0.9 % (FLUSH) 0.9 %
5-40 SYRINGE (ML) INJECTION PRN
Status: CANCELLED | OUTPATIENT
Start: 2022-09-22

## 2022-09-19 RX ORDER — SODIUM CHLORIDE 9 MG/ML
5-250 INJECTION, SOLUTION INTRAVENOUS PRN
Status: DISCONTINUED | OUTPATIENT
Start: 2022-09-19 | End: 2022-09-20 | Stop reason: HOSPADM

## 2022-09-19 RX ORDER — SODIUM CHLORIDE 0.9 % (FLUSH) 0.9 %
5-40 SYRINGE (ML) INJECTION PRN
Status: CANCELLED | OUTPATIENT
Start: 2022-09-20

## 2022-09-19 RX ORDER — DIPHENHYDRAMINE HYDROCHLORIDE 50 MG/ML
50 INJECTION INTRAMUSCULAR; INTRAVENOUS
Status: CANCELLED | OUTPATIENT
Start: 2022-09-19

## 2022-09-19 RX ORDER — DIPHENHYDRAMINE HYDROCHLORIDE 50 MG/ML
50 INJECTION INTRAMUSCULAR; INTRAVENOUS
Status: CANCELLED | OUTPATIENT
Start: 2022-09-26

## 2022-09-19 RX ORDER — HEPARIN SODIUM (PORCINE) LOCK FLUSH IV SOLN 100 UNIT/ML 100 UNIT/ML
500 SOLUTION INTRAVENOUS PRN
Status: CANCELLED | OUTPATIENT
Start: 2022-09-21

## 2022-09-19 RX ORDER — MEPERIDINE HYDROCHLORIDE 50 MG/ML
12.5 INJECTION INTRAMUSCULAR; INTRAVENOUS; SUBCUTANEOUS PRN
Status: CANCELLED | OUTPATIENT
Start: 2022-09-26

## 2022-09-19 RX ORDER — SODIUM CHLORIDE 9 MG/ML
5-250 INJECTION, SOLUTION INTRAVENOUS PRN
Status: CANCELLED | OUTPATIENT
Start: 2022-09-23

## 2022-09-19 RX ORDER — ONDANSETRON 2 MG/ML
8 INJECTION INTRAMUSCULAR; INTRAVENOUS ONCE
Status: CANCELLED | OUTPATIENT
Start: 2022-09-21 | End: 2022-09-20

## 2022-09-19 RX ORDER — FAMOTIDINE 10 MG/ML
20 INJECTION, SOLUTION INTRAVENOUS
Status: CANCELLED | OUTPATIENT
Start: 2022-09-21

## 2022-09-19 RX ORDER — FAMOTIDINE 10 MG/ML
20 INJECTION, SOLUTION INTRAVENOUS
Status: CANCELLED | OUTPATIENT
Start: 2022-09-23

## 2022-09-19 RX ORDER — ALBUTEROL SULFATE 90 UG/1
4 AEROSOL, METERED RESPIRATORY (INHALATION) PRN
Status: CANCELLED | OUTPATIENT
Start: 2022-09-23

## 2022-09-19 RX ORDER — HEPARIN SODIUM (PORCINE) LOCK FLUSH IV SOLN 100 UNIT/ML 100 UNIT/ML
500 SOLUTION INTRAVENOUS PRN
Status: CANCELLED | OUTPATIENT
Start: 2022-09-19

## 2022-09-19 RX ORDER — ACETAMINOPHEN 325 MG/1
650 TABLET ORAL ONCE
Status: CANCELLED | OUTPATIENT
Start: 2022-09-19 | End: 2022-09-19

## 2022-09-19 RX ORDER — SODIUM CHLORIDE 9 MG/ML
5-40 INJECTION INTRAVENOUS PRN
Status: CANCELLED | OUTPATIENT
Start: 2022-10-10

## 2022-09-19 RX ORDER — ACETAMINOPHEN 325 MG/1
650 TABLET ORAL ONCE
Status: CANCELLED | OUTPATIENT
Start: 2022-09-26 | End: 2022-09-25

## 2022-09-19 RX ORDER — SODIUM CHLORIDE 9 MG/ML
INJECTION, SOLUTION INTRAVENOUS CONTINUOUS
Status: CANCELLED | OUTPATIENT
Start: 2022-10-10

## 2022-09-19 RX ORDER — DIPHENHYDRAMINE HYDROCHLORIDE 50 MG/ML
50 INJECTION INTRAMUSCULAR; INTRAVENOUS
Status: CANCELLED | OUTPATIENT
Start: 2022-09-21

## 2022-09-19 RX ORDER — MEPERIDINE HYDROCHLORIDE 50 MG/ML
12.5 INJECTION INTRAMUSCULAR; INTRAVENOUS; SUBCUTANEOUS PRN
Status: CANCELLED | OUTPATIENT
Start: 2022-09-21

## 2022-09-19 RX ORDER — HEPARIN SODIUM (PORCINE) LOCK FLUSH IV SOLN 100 UNIT/ML 100 UNIT/ML
500 SOLUTION INTRAVENOUS PRN
Status: CANCELLED | OUTPATIENT
Start: 2022-10-10

## 2022-09-19 RX ORDER — FAMOTIDINE 10 MG/ML
20 INJECTION, SOLUTION INTRAVENOUS
Status: CANCELLED | OUTPATIENT
Start: 2022-09-22

## 2022-09-19 RX ORDER — SODIUM CHLORIDE 0.9 % (FLUSH) 0.9 %
5-40 SYRINGE (ML) INJECTION PRN
Status: CANCELLED | OUTPATIENT
Start: 2022-09-26

## 2022-09-19 RX ORDER — DIPHENHYDRAMINE HYDROCHLORIDE 50 MG/ML
50 INJECTION INTRAMUSCULAR; INTRAVENOUS
Status: CANCELLED | OUTPATIENT
Start: 2022-09-23

## 2022-09-19 RX ORDER — ACETAMINOPHEN 325 MG/1
650 TABLET ORAL
Status: CANCELLED | OUTPATIENT
Start: 2022-10-10

## 2022-09-19 RX ORDER — SODIUM CHLORIDE 0.9 % (FLUSH) 0.9 %
5-40 SYRINGE (ML) INJECTION PRN
Status: CANCELLED | OUTPATIENT
Start: 2022-09-19

## 2022-09-19 RX ORDER — HEPARIN SODIUM (PORCINE) LOCK FLUSH IV SOLN 100 UNIT/ML 100 UNIT/ML
500 SOLUTION INTRAVENOUS PRN
Status: CANCELLED | OUTPATIENT
Start: 2022-09-26

## 2022-09-19 RX ORDER — MEPERIDINE HYDROCHLORIDE 50 MG/ML
12.5 INJECTION INTRAMUSCULAR; INTRAVENOUS; SUBCUTANEOUS PRN
Status: CANCELLED | OUTPATIENT
Start: 2022-10-10

## 2022-09-19 RX ORDER — SODIUM CHLORIDE 0.9 % (FLUSH) 0.9 %
5-40 SYRINGE (ML) INJECTION PRN
Status: DISCONTINUED | OUTPATIENT
Start: 2022-09-19 | End: 2022-09-20 | Stop reason: HOSPADM

## 2022-09-19 RX ORDER — ONDANSETRON 2 MG/ML
8 INJECTION INTRAMUSCULAR; INTRAVENOUS ONCE
Status: CANCELLED | OUTPATIENT
Start: 2022-09-23 | End: 2022-09-22

## 2022-09-19 RX ORDER — SODIUM CHLORIDE 9 MG/ML
INJECTION, SOLUTION INTRAVENOUS CONTINUOUS
Status: CANCELLED | OUTPATIENT
Start: 2022-09-20

## 2022-09-19 RX ORDER — ONDANSETRON 2 MG/ML
8 INJECTION INTRAMUSCULAR; INTRAVENOUS
Status: CANCELLED | OUTPATIENT
Start: 2022-09-26

## 2022-09-19 RX ORDER — DIPHENHYDRAMINE HYDROCHLORIDE 50 MG/ML
50 INJECTION INTRAMUSCULAR; INTRAVENOUS
Status: CANCELLED | OUTPATIENT
Start: 2022-09-20

## 2022-09-19 RX ORDER — SODIUM CHLORIDE 9 MG/ML
INJECTION, SOLUTION INTRAVENOUS CONTINUOUS
Status: CANCELLED | OUTPATIENT
Start: 2022-09-26

## 2022-09-19 RX ORDER — ONDANSETRON 2 MG/ML
8 INJECTION INTRAMUSCULAR; INTRAVENOUS ONCE
Status: COMPLETED | OUTPATIENT
Start: 2022-09-19 | End: 2022-09-19

## 2022-09-19 RX ADMIN — SODIUM CHLORIDE, PRESERVATIVE FREE 10 ML: 5 INJECTION INTRAVENOUS at 11:15

## 2022-09-19 RX ADMIN — BLEOMYCIN SULFATE 30 UNITS: 30 INJECTION, POWDER, LYOPHILIZED, FOR SOLUTION INTRAMUSCULAR; INTRAPLEURAL; INTRAVENOUS; SUBCUTANEOUS at 13:35

## 2022-09-19 RX ADMIN — DEXAMETHASONE SODIUM PHOSPHATE 12 MG: 4 INJECTION, SOLUTION INTRAMUSCULAR; INTRAVENOUS at 12:44

## 2022-09-19 RX ADMIN — SODIUM CHLORIDE 100 ML/HR: 9 INJECTION, SOLUTION INTRAVENOUS at 11:15

## 2022-09-19 RX ADMIN — CISPLATIN 49 MG: 1 INJECTION, SOLUTION INTRAVENOUS at 15:08

## 2022-09-19 RX ADMIN — ACETAMINOPHEN 650 MG: 325 TABLET, FILM COATED ORAL at 11:22

## 2022-09-19 RX ADMIN — ETOPOSIDE 246 MG: 20 INJECTION INTRAVENOUS at 14:00

## 2022-09-19 RX ADMIN — FOSAPREPITANT 150 MG: 150 INJECTION, POWDER, LYOPHILIZED, FOR SOLUTION INTRAVENOUS at 13:00

## 2022-09-19 RX ADMIN — DIPHENHYDRAMINE HYDROCHLORIDE 25 MG: 50 INJECTION INTRAMUSCULAR; INTRAVENOUS at 11:22

## 2022-09-19 RX ADMIN — POTASSIUM CHLORIDE: 2 INJECTION, SOLUTION, CONCENTRATE INTRAVENOUS at 16:12

## 2022-09-19 RX ADMIN — POTASSIUM CHLORIDE: 2 INJECTION, SOLUTION, CONCENTRATE INTRAVENOUS at 11:40

## 2022-09-19 RX ADMIN — Medication 10 ML: at 10:12

## 2022-09-19 RX ADMIN — ONDANSETRON 8 MG: 2 INJECTION INTRAMUSCULAR; INTRAVENOUS at 12:43

## 2022-09-19 ASSESSMENT — PATIENT HEALTH QUESTIONNAIRE - PHQ9
SUM OF ALL RESPONSES TO PHQ9 QUESTIONS 1 & 2: 0
SUM OF ALL RESPONSES TO PHQ QUESTIONS 1-9: 0
2. FEELING DOWN, DEPRESSED OR HOPELESS: 0
SUM OF ALL RESPONSES TO PHQ QUESTIONS 1-9: 0
1. LITTLE INTEREST OR PLEASURE IN DOING THINGS: 0

## 2022-09-19 NOTE — PROGRESS NOTES
Patient arrived to port lab for port access and lab draw   [facility] accessed and labs drawn per protocol   *Port remains accessed   Patient discharged from port lab ambulatory*

## 2022-09-19 NOTE — PROGRESS NOTES
Arrived to the [facility]. Assessment completed, labs reviewed. Bleomycin and Etoposide completed, Cisplatin infusing. Any issues or concerns during appointment: No.  Patient aware of next infusion appointment on 9/20/22 @1030. Patient instructed to call provider with temperature of 100.4 or greater or nausea/vomiting/ diarrhea or pain not controlled by medications  Report given to Zev Bolton RN.

## 2022-09-19 NOTE — PROGRESS NOTES
9/19/22 saw pt today with Dr. Sade Villafana for pre chemo c3d1 BEP. He is tolerating chemo well. PO intake is good. Denies any issues. Proceed to infusion. Follow up in 1 week. Encouraged to call with any concerns. Navigation will continue to follow.

## 2022-09-19 NOTE — PROGRESS NOTES
[facility]: Office Visit Progress Note    Chief Complaint:    Testicular cancer    History of Present Illness:  38 y.o. male past medical history of hypertension, hyperlipidemia, diabetes, presented to the emergency room with testicle swelling and lower back and abdominal pain. Reported low back pain intermittently over the past month, did not have work-up yet. Now he noticed testicular swelling with pain and presented to the emergency room for further evaluation. Scrotal ultrasound showed large left side testicular mass highly concerning for testicular malignancy measuring 4.4 x 3.7 x 3.9 cm. CT showed massive retroperitoneal lymph nodes conglomerates and a largest retrocrural lymph nodes suspicious for lymphoma versus metastatic disease. There is severe luminal narrowing of the IVC given the mass-effect upon the IVC and mass-effect upon the abdominal aorta as well as the iliopsoas musculature. Oncology is consulted for recommendation. 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 Review of Systems:  Constitutional Anorexia, fatigue. Weight loss. Denies fever or chills. HEENT Denies trauma, bluring vision, hearing loss, ear pain, nosebleeds, sore throat, neck pain and ear discharge. Skin Rash on scalp. Denies rashes. Lungs Denies shortness of breath, cough, sputum production or hemoptysis. Cardiovascular Denies, palpitations, orthopnea, claudication and leg swelling. Gastrointestinal Denies nausea, vomiting, bowel changes. Denies bloody or black stools. Denies abdominal pain.  Status post left orchiectomy. Denies dysuria, frequency or hesitancy of urination   Neuro Denies headaches, visual changes or ataxia. Denies dizziness, tingling, tremors, sensory change, speech change, focal weakness and headaches.      Hematology Denies nasal/gum bleeding, denies easy bruise Endo Denies heat/cold intolerance, denies diabetes. MSK Back pain. Denies swollen legs, myalgias and falls. Psychiatric/Behavioral Denies depression and substance abuse. The patient is not nervous/anxious. No Known Allergies  Past Medical History:   Diagnosis Date    Benign essential hypertension     controlled with med    Diabetes mellitus type 2, noninsulin dependent ([facility] [address]     oral reliant; AVG -105; pt does have s.s of hypoglycemia but does not know what BS is when it gets low; last A1C    Fatty liver     per pt    Hyperlipidemia     managed with med    Testicular mass     Left     Past Surgical History:   Procedure Laterality Date    APPENDECTOMY      IR PORT PLACEMENT EQUAL OR GREATER THAN 5 YEARS  7/22/2022    IR PORT PLACEMENT EQUAL OR GREATER THAN 5 YEARS 7/22/2022 [facility] SPE[facility]    KIDNEY STONE SURGERY Right 2011    TESTICLE REMOVAL Left 7/7/2022    LEFT RADICAL ORCHIECTOMY Elwyn Seller APPROACH performed by David Mcclain MD at [facility] MAIN OR     No family history on file.   Social History     Socioeconomic History    Marital status:      Spouse name: Not on file    Number of children: Not on file    Years of education: Not on file    Highest education level: Not on file   Occupational History    Not on file   Tobacco Use    Smoking status: Never    Smokeless tobacco: Never   Vaping Use    Vaping Use: Never used   Substance and Sexual Activity    Alcohol use: Yes     Comment: one or two per week    Drug use: Never    Sexual activity: Not on file   Other Topics Concern    Not on file   Social History Narrative    Not on file     Social Determinants of Health     Financial Resource Strain: Not on file   Food Insecurity: Not on file   Transportation Needs: Not on file   Physical Activity: Not on file   Stress: Not on file   Social Connections: Not on file   Intimate Partner Violence: Not on file   Housing Stability: Not on file     Current Outpatient Medications   Medication Sig Dispense Refill    metroNIDAZOLE (METROGEL) 0.75 % gel Apply topically 2 times daily. 45 g 1    hydrocortisone (ANUSOL-HC) 2.5 % CREA rectal cream Apply 2-3 times a day 28 g 1    lidocaine-prilocaine (EMLA) 2.5-2.5 % cream Apply topically as needed. 1 each 2    ondansetron (ZOFRAN-ODT) 8 MG TBDP disintegrating tablet Place 1 tablet under the tongue every 8 hours as needed for Nausea or Vomiting 90 tablet 2    prochlorperazine (COMPAZINE) 10 MG tablet Take 1 tablet by mouth every 6 hours as needed (cinv) 90 tablet 2    telmisartan-amLODIPine (TWYNSTA) 80-5 MG TABS tablet Take 1 tablet by mouth daily      atenolol (TENORMIN) 50 MG tablet Take 50 mg by mouth daily      atorvastatin (LIPITOR) 20 MG tablet Take 20 mg by mouth nightly      metFORMIN (GLUCOPHAGE-XR) 500 MG extended release tablet Take 2 with breakfast and supper - Diabetes      spironolactone (ALDACTONE) 50 MG tablet Take 50 mg by mouth daily       No current facility-administered medications for this visit.      Facility-Administered Medications Ordered in Other Visits   Medication Dose Route Frequency Provider Last Rate Last Admin    sodium chloride flush 0.9 % injection 10 mL  10 mL IntraVENous PRN Severiano Barefoot, MD   10 mL at 09/19/22 1012    0.9 % sodium chloride infusion  5-250 mL/hr IntraVENous PRN Severiano Barefoot, MD        potassium chloride 10 mEq, magnesium sulfate 1,000 mg in sodium chloride 0.9 % 500 mL IVPB   IntraVENous Once Severiano Barefoot, MD        acetaminophen (TYLENOL) tablet 650 mg  650 mg Oral Once Severiano Barefoot, MD        diphenhydrAMINE (BENADRYL) injection 25 mg  25 mg IntraVENous Once Severiano Barefoot, MD        fosaprepitant (EMEND) 150 mg in sodium chloride 0.9 % 150 mL IVPB  150 mg IntraVENous Once Severiano Barefoot, MD        ondansetron [facility]) injection 8 mg  8 mg IntraVENous Once Severiano Barefoot, MD        bleomycin (BLEOCIN) 30 Units in sodium chloride 0.9 % 50 mL chemo IVPB  30 Units IntraVENous Once [place] Levels X Roberto Wilson MD        etoposide (VEPESID) 246 mg in sodium chloride 0.9 % 500 mL chemo IVPB  100 mg/m2 (Treatment Plan Recorded) IntraVENous Once Lucila Gonzalez MD        CISplatin (PLATINOL) 49 mg in sodium chloride 0.9 % 500 mL chemo IVPB  20 mg/m2 (Treatment Plan Recorded) IntraVENous Once Lucila Gonzalez MD        potassium chloride 10 mEq, magnesium sulfate 1,000 mg in sodium chloride 0.9 % 500 mL IVPB   IntraVENous Once Lucila Gonzalez MD        sodium chloride flush 0.9 % injection 5-40 mL  5-40 mL IntraVENous PRN Lucila Gonzalez MD        diphenhydrAMINE (BENADRYL) injection 50 mg  50 mg IntraVENous Once PRN Lucila Gonzalez MD        famotidine (PEPCID) 20 mg in sodium chloride (PF) 10 mL injection  20 mg IntraVENous Once PRN Lucila Gonzalez MD        hydrocortisone sodium succinate PF (SOLU-CORTEF) injection 100 mg  100 mg IntraVENous Once PRN Lucila Gonzalez MD           OBJECTIVE:  /74 (Site: Right Upper Arm, Position: Sitting, Cuff Size: Medium Adult)   Pulse 67   Temp 97.6 °F (36.4 °C) (Oral)   Resp 14   Ht 6' 1\" (1.854 m)   Wt 245 lb 4.8 oz (111.3 kg)   SpO2 98%   BMI 32.36 kg/m²     Physical Exam:  Constitutional: Oriented to person, place, and time. Well-developed and well-nourished. HEENT: Normocephalic and atraumatic. Oropharynx is clear and moist.   Conjunctivae and EOM are normal. Pupils are equal, round, and reactive to light. No scleral icterus. Neck supple. No JVD present. No tracheal deviation present. No thyromegaly present. Lymph node No palpable submandibular, cervical, supraclavicular, axillary and inguinal lymph nodes. Skin Rash on scalp. Warm and dry. No bruising noted. No erythema. No pallor. Respiratory Effort normal and breath sounds normal.  No respiratory distress. No wheezes. No rales. No tenderness. CVS Normal rate, regular rhythm and normal heart sounds. Exam reveals no gallop, no friction and no rub. No murmur heard.    Abdomen Status post left orchiectomy. Right testis soft nontender no mass. Soft. Bowel sounds are normal. Exhibits no distension. There is no tenderness. There is no rebound and no guarding. Neuro Normal reflexes. No cranial nerve deficit. Exhibits normal muscle tone, 5 of 5 strength of all extremities. MSK Normal range of motion in general.  No edema and no tenderness.    Psych Normal mood, affect, behavior, judgment and thought content      Labs:  Recent Results (from the past 24 hour(s))   CBC with Auto Differential    Collection Time: 09/19/22 10:04 AM   Result Value Ref Range    WBC 7.9 4.3 - 11.1 K/uL    RBC 4.30 4.23 - 5.6 M/uL    Hemoglobin 12.6 (L) 13.6 - 17.2 g/dL    Hematocrit 37.0 %    MCV 86.0 79.6 - 97.8 FL    MCH 29.3 26.1 - 32.9 PG    MCHC 34.1 31.4 - 35.0 g/dL    RDW 16.3 (H) 11.9 - 14.6 %    Platelets 529 219 - 605 K/uL    MPV 8.8 (L) 9.4 - 12.3 FL    nRBC 0.03 0.0 - 0.2 K/uL    Seg Neutrophils 62 43 - 78 %    Lymphocytes 14 13 - 44 %    Monocytes 13 (H) 4.0 - 12.0 %    Eosinophils % 0 (L) 0.5 - 7.8 %    Basophils 0 0.0 - 2.0 %    Immature Granulocytes 11 (H) 0.0 - 5.0 %    Segs Absolute 4.9 1.7 - 8.2 K/UL    Absolute Lymph # 1.1 0.5 - 4.6 K/UL    Absolute Mono # 1.0 0.1 - 1.3 K/UL    Absolute Eos # 0.0 0.0 - 0.8 K/UL    Basophils Absolute 0.0 0.0 - 0.2 K/UL    Absolute Immature Granulocyte 0.9 (H) 0.0 - 0.5 K/UL    RBC Comment SLIGHT  ANISOCYTOSIS + POIKILOCYTOSIS        RBC Comment SLIGHT  POLYCHROMASIA        RBC Comment OCCASIONAL  OVALOCYTES        WBC Comment Result Confirmed By Smear      Platelet Comment ADEQUATE      Differential Type AUTOMATED     Comprehensive Metabolic Panel    Collection Time: 09/19/22 10:04 AM   Result Value Ref Range    Sodium 137 136 - 145 mmol/L    Potassium 4.1 3.5 - 5.1 mmol/L    Chloride 105 101 - 110 mmol/L    CO2 24 21 - 32 mmol/L    Anion Gap 8 4 - 13 mmol/L    Glucose 211 (H) 65 - 100 mg/dL    BUN 12 6 - 23 MG/DL    Creatinine 1.10 0.8 - 1.5 MG/DL    GFR African American >60 >60 ml/min/1.73m2    GFR Non- >60 >60 ml/min/1.73m2    Calcium 9.2 8.3 - 10.4 MG/DL    Total Bilirubin 0.5 0.2 - 1.1 MG/DL    ALT 34 12 - 65 U/L    AST 17 15 - 37 U/L    Alk Phosphatase 78 50 - 136 U/L    Total Protein 7.3 6.3 - 8.2 g/dL    Albumin 3.7 3.5 - 5.0 g/dL    Globulin 3.6 (H) 2.3 - 3.5 g/dL    Albumin/Globulin Ratio 1.0 (L) 1.2 - 3.5     AFP Tumor Marker    Collection Time: 09/19/22 10:04 AM   Result Value Ref Range    AFP-Tumor Marker 3.00 <8.0 ng/mL   HCG, Quantitative, Pregnancy    Collection Time: 09/19/22 10:04 AM   Result Value Ref Range    hCG Quant <1 0.0 - 2.0 MIU/ML   Lactate Dehydrogenase    Collection Time: 09/19/22 10:04 AM   Result Value Ref Range     (H) 100 - 190 U/L           Imaging:  No results found for this or any previous visit. ASSESSMENT/PLAN:   Diagnosis Orders   1.  Seminoma (HCC)  CBC With Auto Differential    Comprehensive metabolic panel    Magnesium    CBC With Auto Differential    CBC With Auto Differential    CBC with Auto Differential    Comprehensive Metabolic Panel    DISCONTINUED: 0.9 % sodium chloride infusion    DISCONTINUED: potassium chloride 10 mEq, magnesium sulfate 1,000 mg in sodium chloride 0.9 % 500 mL IVPB    DISCONTINUED: acetaminophen (TYLENOL) tablet 650 mg    DISCONTINUED: diphenhydrAMINE (BENADRYL) injection 25 mg    DISCONTINUED: fosaprepitant (EMEND) 150 mg in sodium chloride 0.9 % 150 mL IVPB    DISCONTINUED: ondansetron (ZOFRAN) injection 8 mg    DISCONTINUED: bleomycin (BLEOCIN) 30 Units in sodium chloride 0.9 % 50 mL chemo IVPB    DISCONTINUED: etoposide (VEPESID) 246 mg in sodium chloride 0.9 % 500 mL chemo IVPB    DISCONTINUED: CISplatin (PLATINOL) 49 mg in sodium chloride 0.9 % 500 mL chemo IVPB    DISCONTINUED: potassium chloride 10 mEq, magnesium sulfate 1,000 mg in sodium chloride 0.9 % 500 mL IVPB    DISCONTINUED: sodium chloride flush 0.9 % injection 5-40 mL    DISCONTINUED: diphenhydrAMINE (BENADRYL) injection 50 mg    DISCONTINUED: famotidine (PEPCID) injection 20 mg    DISCONTINUED: hydrocortisone sodium succinate PF (SOLU-CORTEF) injection 100 mg      2. High risk medication use        3. Insomnia, unspecified type        4.  Rash                38 y.o. 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, he need tumor markers for further diagnosis, patient and wife are tearful and discussed that this was a potentially rather curable cancer, typically would pursue inguinal radical orchiectomy for diagnosis and local disease control, nonetheless he is a typical case radiographically if beta-hCG is highly elevated, and the severe narrowing of IVC is of clinical concern of complication unless chemotherapy is started soon, discussed with uroncology and may start chemo urgently if needed, discussed options with patient and he agrees with starting chemotherapy as soon as possible to avoid further complication, elevated beta-hCG and normal AFP consistent with seminoma, completed sperm banking and received a left radical inguinal orchiectomy on 7/7/2022 and the pathology showed pure seminoma, followed in office on 7/19/2022 and we discussed the result, beta-hCG and LDH increasing rapidly despite of the resection, increased back pain and night sweat, placed port and start BEP x3 7/25/2022, return on 8/1/2022, hCG down to 82, however not feeling well and lost 14 pounds in the week, not short of breath but reports some atypical chest pain that lasts for seconds each time, tachycardic and blood pressure lower than baseline, stat CT chest to rule out PE, arrange IV fluid for dehydration and received a day 8 bleomycin, return 8/8/2022, chest pain and short of breath resolved, but having mucositis with difficulty eating, prescribed Peridex and Magic mouthwash, discussed ways to improve nutrition and hydration, return on 9/19/2022, labs reviewed and proceed to cycle 3-day 1, right posterior iliac pain and monitor, continue Flagyl cream for rash, skin pigmentation no clinical concern, Ambien as needed for insomnia, return next week but call as needed. Baseline PFT WNL. All questions are answered to their satisfaction. They will call for further questions and concerns. ECOG PERFORMANCE STATUS - 0-Fully active, able to carry on all pre-disease performance without restriction. Pain - 3/10. Mild to moderate pain, requiring medication - see MAR     Fatigue - No flowsheet data found. Distress - No flowsheet data found. Total time independently spent on today's visit was 40min. This time included: face-to-face time evaluating the patient as well as additional non-face-to-face time spent on: Preparing to see the patient by obtaining and reviewing previous test results, records and medical history, Performing a medically appropriate history and exam and documenting relevant clinical information for this visit, Counseling and educating patient and family, Ordering medications, Communicating with other health care professionals and Referring patient to another health care provider. Elements of this note have been dictated via voice recognition software. Text and phrases may be limited by the accuracy and autoconversion of the software. The chart has been reviewed, but errors may still be present. Chrystine Sandhoff, M.D.   [address], [address] [place] [address]  Office : [phone]  Fax : [phone]

## 2022-09-20 ENCOUNTER — HOSPITAL ENCOUNTER (OUTPATIENT)
Dept: INFUSION THERAPY | Age: 42
Discharge: HOME | End: 2022-09-20
Payer: COMMERCIAL

## 2022-09-20 VITALS
RESPIRATION RATE: 16 BRPM | HEART RATE: 84 BPM | BODY MASS INDEX: 32.9 KG/M2 | TEMPERATURE: 97.4 F | WEIGHT: 249.4 LBS | DIASTOLIC BLOOD PRESSURE: 73 MMHG | SYSTOLIC BLOOD PRESSURE: 121 MMHG | OXYGEN SATURATION: 98 %

## 2022-09-20 DIAGNOSIS — C62.90 SEMINOMA (HCC): Primary | ICD-10-CM

## 2022-09-20 PROCEDURE — 96417 CHEMO IV INFUS EACH ADDL SEQ: CPT

## 2022-09-20 PROCEDURE — 96366 THER/PROPH/DIAG IV INF ADDON: CPT

## 2022-09-20 PROCEDURE — 6360000002 HC RX W HCPCS: Performed by: INTERNAL MEDICINE

## 2022-09-20 PROCEDURE — 96367 TX/PROPH/DG ADDL SEQ IV INF: CPT

## 2022-09-20 PROCEDURE — 96413 CHEMO IV INFUSION 1 HR: CPT

## 2022-09-20 PROCEDURE — 96375 TX/PRO/DX INJ NEW DRUG ADDON: CPT

## 2022-09-20 PROCEDURE — 2580000003 HC RX 258: Performed by: INTERNAL MEDICINE

## 2022-09-20 RX ORDER — SODIUM CHLORIDE 0.9 % (FLUSH) 0.9 %
5-40 SYRINGE (ML) INJECTION PRN
Status: DISCONTINUED | OUTPATIENT
Start: 2022-09-20 | End: 2022-09-21 | Stop reason: HOSPADM

## 2022-09-20 RX ORDER — SODIUM CHLORIDE 9 MG/ML
5-250 INJECTION, SOLUTION INTRAVENOUS PRN
Status: DISCONTINUED | OUTPATIENT
Start: 2022-09-20 | End: 2022-09-21 | Stop reason: HOSPADM

## 2022-09-20 RX ORDER — ONDANSETRON 2 MG/ML
8 INJECTION INTRAMUSCULAR; INTRAVENOUS ONCE
Status: COMPLETED | OUTPATIENT
Start: 2022-09-20 | End: 2022-09-20

## 2022-09-20 RX ADMIN — CISPLATIN 49 MG: 1 INJECTION, SOLUTION INTRAVENOUS at 14:24

## 2022-09-20 RX ADMIN — SODIUM CHLORIDE, PRESERVATIVE FREE 10 ML: 5 INJECTION INTRAVENOUS at 11:15

## 2022-09-20 RX ADMIN — DEXAMETHASONE SODIUM PHOSPHATE 12 MG: 4 INJECTION, SOLUTION INTRAMUSCULAR; INTRAVENOUS at 12:34

## 2022-09-20 RX ADMIN — SODIUM CHLORIDE 100 ML/HR: 9 INJECTION, SOLUTION INTRAVENOUS at 12:50

## 2022-09-20 RX ADMIN — ONDANSETRON 8 MG: 2 INJECTION INTRAMUSCULAR; INTRAVENOUS at 12:32

## 2022-09-20 RX ADMIN — POTASSIUM CHLORIDE: 2 INJECTION, SOLUTION, CONCENTRATE INTRAVENOUS at 11:29

## 2022-09-20 RX ADMIN — POTASSIUM CHLORIDE: 2 INJECTION, SOLUTION, CONCENTRATE INTRAVENOUS at 15:31

## 2022-09-20 RX ADMIN — ETOPOSIDE 246 MG: 20 INJECTION INTRAVENOUS at 13:09

## 2022-09-21 ENCOUNTER — HOSPITAL ENCOUNTER (OUTPATIENT)
Dept: INFUSION THERAPY | Age: 42
Discharge: HOME | End: 2022-09-21
Payer: COMMERCIAL

## 2022-09-21 VITALS
RESPIRATION RATE: 16 BRPM | OXYGEN SATURATION: 98 % | DIASTOLIC BLOOD PRESSURE: 65 MMHG | BODY MASS INDEX: 32.9 KG/M2 | HEART RATE: 87 BPM | WEIGHT: 249.4 LBS | SYSTOLIC BLOOD PRESSURE: 134 MMHG | TEMPERATURE: 97.5 F

## 2022-09-21 DIAGNOSIS — C62.90 SEMINOMA (HCC): Primary | ICD-10-CM

## 2022-09-21 DIAGNOSIS — R11.0 NAUSEA: ICD-10-CM

## 2022-09-21 DIAGNOSIS — R11.0 NAUSEA: Primary | ICD-10-CM

## 2022-09-21 PROCEDURE — 96417 CHEMO IV INFUS EACH ADDL SEQ: CPT

## 2022-09-21 PROCEDURE — 2580000003 HC RX 258: Performed by: INTERNAL MEDICINE

## 2022-09-21 PROCEDURE — 96366 THER/PROPH/DIAG IV INF ADDON: CPT

## 2022-09-21 PROCEDURE — 6360000002 HC RX W HCPCS: Performed by: INTERNAL MEDICINE

## 2022-09-21 PROCEDURE — 96367 TX/PROPH/DG ADDL SEQ IV INF: CPT

## 2022-09-21 PROCEDURE — 96413 CHEMO IV INFUSION 1 HR: CPT

## 2022-09-21 PROCEDURE — 96375 TX/PRO/DX INJ NEW DRUG ADDON: CPT

## 2022-09-21 RX ORDER — SODIUM CHLORIDE 0.9 % (FLUSH) 0.9 %
5-40 SYRINGE (ML) INJECTION PRN
Status: DISCONTINUED | OUTPATIENT
Start: 2022-09-21 | End: 2022-09-22 | Stop reason: HOSPADM

## 2022-09-21 RX ORDER — LORAZEPAM 2 MG/ML
1 INJECTION INTRAMUSCULAR
Status: COMPLETED | OUTPATIENT
Start: 2022-09-21 | End: 2022-09-21

## 2022-09-21 RX ORDER — LORAZEPAM 2 MG/ML
1 INJECTION INTRAMUSCULAR
Status: CANCELLED
Start: 2022-09-21

## 2022-09-21 RX ORDER — SODIUM CHLORIDE 9 MG/ML
5-250 INJECTION, SOLUTION INTRAVENOUS PRN
Status: DISCONTINUED | OUTPATIENT
Start: 2022-09-21 | End: 2022-09-22 | Stop reason: HOSPADM

## 2022-09-21 RX ORDER — ONDANSETRON 2 MG/ML
8 INJECTION INTRAMUSCULAR; INTRAVENOUS ONCE
Status: COMPLETED | OUTPATIENT
Start: 2022-09-21 | End: 2022-09-21

## 2022-09-21 RX ORDER — LORAZEPAM 2 MG/ML
1-2 INJECTION INTRAMUSCULAR
Status: CANCELLED
Start: 2022-09-21

## 2022-09-21 RX ADMIN — LORAZEPAM 1 MG: 2 INJECTION INTRAMUSCULAR; INTRAVENOUS at 11:16

## 2022-09-21 RX ADMIN — POTASSIUM CHLORIDE: 2 INJECTION, SOLUTION, CONCENTRATE INTRAVENOUS at 13:49

## 2022-09-21 RX ADMIN — POTASSIUM CHLORIDE: 2 INJECTION, SOLUTION, CONCENTRATE INTRAVENOUS at 09:35

## 2022-09-21 RX ADMIN — SODIUM CHLORIDE, PRESERVATIVE FREE 10 ML: 5 INJECTION INTRAVENOUS at 09:15

## 2022-09-21 RX ADMIN — ETOPOSIDE 246 MG: 20 INJECTION INTRAVENOUS at 11:20

## 2022-09-21 RX ADMIN — DEXAMETHASONE SODIUM PHOSPHATE 12 MG: 4 INJECTION, SOLUTION INTRAMUSCULAR; INTRAVENOUS at 10:41

## 2022-09-21 RX ADMIN — ONDANSETRON 8 MG: 2 INJECTION INTRAMUSCULAR; INTRAVENOUS at 10:40

## 2022-09-21 RX ADMIN — SODIUM CHLORIDE 25 ML/HR: 9 INJECTION, SOLUTION INTRAVENOUS at 09:10

## 2022-09-21 RX ADMIN — CISPLATIN 49 MG: 1 INJECTION, SOLUTION INTRAVENOUS at 12:35

## 2022-09-21 NOTE — PROGRESS NOTES
Arrived to the [facility]. VP16 and Cisplatin completed. Patient tolerated well. Any issues or concerns during appointment: none. Patient aware of next infusion appointment on 9/22/22 (date) at 0830 (time). Patient aware of next lab and [facility] office visit on 9/26/22 (date) at 80 (time). Patient instructed to call provider with temperature of 100.4 or greater or nausea/vomiting/ diarrhea or pain not controlled by medications  Discharged ambulatory accompanied by spouse.

## 2022-09-22 ENCOUNTER — HOSPITAL ENCOUNTER (OUTPATIENT)
Dept: INFUSION THERAPY | Age: 42
Discharge: HOME | End: 2022-09-22
Payer: COMMERCIAL

## 2022-09-22 VITALS
RESPIRATION RATE: 16 BRPM | TEMPERATURE: 97 F | DIASTOLIC BLOOD PRESSURE: 82 MMHG | HEART RATE: 75 BPM | SYSTOLIC BLOOD PRESSURE: 126 MMHG | OXYGEN SATURATION: 99 % | BODY MASS INDEX: 33.51 KG/M2 | WEIGHT: 254 LBS

## 2022-09-22 DIAGNOSIS — C62.90 SEMINOMA (HCC): ICD-10-CM

## 2022-09-22 DIAGNOSIS — R11.0 NAUSEA: Primary | ICD-10-CM

## 2022-09-22 LAB
ALBUMIN SERPL-MCNC: 3.4 G/DL (ref 3.5–5)
ALBUMIN/GLOB SERPL: 1.1 {RATIO} (ref 1.2–3.5)
ALP SERPL-CCNC: 68 U/L (ref 50–136)
ALT SERPL-CCNC: 24 U/L (ref 12–65)
ANION GAP SERPL CALC-SCNC: 8 MMOL/L (ref 4–13)
AST SERPL-CCNC: 5 U/L (ref 15–37)
BILIRUB SERPL-MCNC: 0.7 MG/DL (ref 0.2–1.1)
BUN SERPL-MCNC: 22 MG/DL (ref 6–23)
CALCIUM SERPL-MCNC: 8.6 MG/DL (ref 8.3–10.4)
CHLORIDE SERPL-SCNC: 104 MMOL/L (ref 101–110)
CO2 SERPL-SCNC: 23 MMOL/L (ref 21–32)
CREAT SERPL-MCNC: 1.1 MG/DL (ref 0.8–1.5)
GLOBULIN SER CALC-MCNC: 3 G/DL (ref 2.3–3.5)
GLUCOSE SERPL-MCNC: 181 MG/DL (ref 65–100)
POTASSIUM SERPL-SCNC: 3.7 MMOL/L (ref 3.5–5.1)
PROT SERPL-MCNC: 6.4 G/DL (ref 6.3–8.2)
SODIUM SERPL-SCNC: 135 MMOL/L (ref 136–145)

## 2022-09-22 PROCEDURE — 80053 COMPREHEN METABOLIC PANEL: CPT

## 2022-09-22 PROCEDURE — 96375 TX/PRO/DX INJ NEW DRUG ADDON: CPT

## 2022-09-22 PROCEDURE — 96367 TX/PROPH/DG ADDL SEQ IV INF: CPT

## 2022-09-22 PROCEDURE — 6360000002 HC RX W HCPCS: Performed by: INTERNAL MEDICINE

## 2022-09-22 PROCEDURE — 96417 CHEMO IV INFUS EACH ADDL SEQ: CPT

## 2022-09-22 PROCEDURE — 2580000003 HC RX 258: Performed by: INTERNAL MEDICINE

## 2022-09-22 PROCEDURE — 96366 THER/PROPH/DIAG IV INF ADDON: CPT

## 2022-09-22 PROCEDURE — 96413 CHEMO IV INFUSION 1 HR: CPT

## 2022-09-22 RX ORDER — SODIUM CHLORIDE 9 MG/ML
5-250 INJECTION, SOLUTION INTRAVENOUS PRN
Status: DISCONTINUED | OUTPATIENT
Start: 2022-09-22 | End: 2022-09-23 | Stop reason: HOSPADM

## 2022-09-22 RX ORDER — SODIUM CHLORIDE 0.9 % (FLUSH) 0.9 %
5-40 SYRINGE (ML) INJECTION PRN
Status: DISCONTINUED | OUTPATIENT
Start: 2022-09-22 | End: 2022-09-23 | Stop reason: HOSPADM

## 2022-09-22 RX ORDER — ONDANSETRON 2 MG/ML
8 INJECTION INTRAMUSCULAR; INTRAVENOUS ONCE
Status: COMPLETED | OUTPATIENT
Start: 2022-09-22 | End: 2022-09-22

## 2022-09-22 RX ADMIN — ONDANSETRON 8 MG: 2 INJECTION INTRAMUSCULAR; INTRAVENOUS at 10:26

## 2022-09-22 RX ADMIN — CISPLATIN 49 MG: 1 INJECTION, SOLUTION INTRAVENOUS at 12:22

## 2022-09-22 RX ADMIN — SODIUM CHLORIDE, PRESERVATIVE FREE 10 ML: 5 INJECTION INTRAVENOUS at 14:31

## 2022-09-22 RX ADMIN — FOSAPREPITANT 150 MG: 150 INJECTION, POWDER, LYOPHILIZED, FOR SOLUTION INTRAVENOUS at 10:44

## 2022-09-22 RX ADMIN — SODIUM CHLORIDE 25 ML/HR: 9 INJECTION, SOLUTION INTRAVENOUS at 09:20

## 2022-09-22 RX ADMIN — ETOPOSIDE 246 MG: 20 INJECTION INTRAVENOUS at 11:10

## 2022-09-22 RX ADMIN — DEXAMETHASONE SODIUM PHOSPHATE 12 MG: 4 INJECTION, SOLUTION INTRAMUSCULAR; INTRAVENOUS at 10:28

## 2022-09-22 RX ADMIN — POTASSIUM CHLORIDE: 2 INJECTION, SOLUTION, CONCENTRATE INTRAVENOUS at 13:26

## 2022-09-22 RX ADMIN — POTASSIUM CHLORIDE: 2 INJECTION, SOLUTION, CONCENTRATE INTRAVENOUS at 09:25

## 2022-09-22 NOTE — PROGRESS NOTES
Arrived to the [facility]. VP16 and Cisplatin completed. Patient tolerated ell. Any issues or concerns during appointment: Patient with 5lb weight increase since yesterday. Dr Michael Hernandez office notified. Orders received to collect cmp today. CMP results. Dr Claudia Alcala aware of results. No new orders today. Patient aware of next infusion appointment on 9/23 (date) at 10:00 AM (time). Patient instructed to call provider with temperature of 100.4 or greater or nausea/vomiting/ diarrhea or pain not controlled by medications  Discharged ambulatory.

## 2022-09-23 ENCOUNTER — HOSPITAL ENCOUNTER (OUTPATIENT)
Dept: INFUSION THERAPY | Age: 42
Discharge: HOME | End: 2022-09-23
Payer: COMMERCIAL

## 2022-09-23 VITALS
OXYGEN SATURATION: 98 % | BODY MASS INDEX: 32.83 KG/M2 | SYSTOLIC BLOOD PRESSURE: 127 MMHG | HEART RATE: 101 BPM | TEMPERATURE: 97.3 F | DIASTOLIC BLOOD PRESSURE: 80 MMHG | WEIGHT: 248.8 LBS | RESPIRATION RATE: 16 BRPM

## 2022-09-23 DIAGNOSIS — C62.90 SEMINOMA (HCC): ICD-10-CM

## 2022-09-23 DIAGNOSIS — R11.0 NAUSEA: Primary | ICD-10-CM

## 2022-09-23 PROCEDURE — 96413 CHEMO IV INFUSION 1 HR: CPT

## 2022-09-23 PROCEDURE — 96367 TX/PROPH/DG ADDL SEQ IV INF: CPT

## 2022-09-23 PROCEDURE — 96366 THER/PROPH/DIAG IV INF ADDON: CPT

## 2022-09-23 PROCEDURE — 96417 CHEMO IV INFUS EACH ADDL SEQ: CPT

## 2022-09-23 PROCEDURE — 96375 TX/PRO/DX INJ NEW DRUG ADDON: CPT

## 2022-09-23 PROCEDURE — 2580000003 HC RX 258: Performed by: INTERNAL MEDICINE

## 2022-09-23 PROCEDURE — 6360000002 HC RX W HCPCS: Performed by: INTERNAL MEDICINE

## 2022-09-23 RX ORDER — SODIUM CHLORIDE 0.9 % (FLUSH) 0.9 %
5-40 SYRINGE (ML) INJECTION PRN
Status: DISCONTINUED | OUTPATIENT
Start: 2022-09-23 | End: 2022-09-24 | Stop reason: HOSPADM

## 2022-09-23 RX ORDER — SODIUM CHLORIDE 9 MG/ML
5-250 INJECTION, SOLUTION INTRAVENOUS PRN
Status: DISCONTINUED | OUTPATIENT
Start: 2022-09-23 | End: 2022-09-24 | Stop reason: HOSPADM

## 2022-09-23 RX ORDER — ONDANSETRON 2 MG/ML
8 INJECTION INTRAMUSCULAR; INTRAVENOUS ONCE
Status: COMPLETED | OUTPATIENT
Start: 2022-09-23 | End: 2022-09-23

## 2022-09-23 RX ADMIN — POTASSIUM CHLORIDE: 2 INJECTION, SOLUTION, CONCENTRATE INTRAVENOUS at 14:08

## 2022-09-23 RX ADMIN — ONDANSETRON 8 MG: 2 INJECTION INTRAMUSCULAR; INTRAVENOUS at 10:32

## 2022-09-23 RX ADMIN — ETOPOSIDE 246 MG: 20 INJECTION INTRAVENOUS at 11:54

## 2022-09-23 RX ADMIN — POTASSIUM CHLORIDE: 2 INJECTION, SOLUTION, CONCENTRATE INTRAVENOUS at 10:52

## 2022-09-23 RX ADMIN — SODIUM CHLORIDE, PRESERVATIVE FREE 10 ML: 5 INJECTION INTRAVENOUS at 15:10

## 2022-09-23 RX ADMIN — SODIUM CHLORIDE 25 ML/HR: 9 INJECTION, SOLUTION INTRAVENOUS at 10:30

## 2022-09-23 RX ADMIN — CISPLATIN 49 MG: 1 INJECTION, SOLUTION INTRAVENOUS at 13:06

## 2022-09-23 RX ADMIN — DEXAMETHASONE SODIUM PHOSPHATE 12 MG: 4 INJECTION, SOLUTION INTRAMUSCULAR; INTRAVENOUS at 10:37

## 2022-09-23 RX ADMIN — SODIUM CHLORIDE, PRESERVATIVE FREE 10 ML: 5 INJECTION INTRAVENOUS at 10:30

## 2022-09-26 ENCOUNTER — HOSPITAL ENCOUNTER (OUTPATIENT)
Dept: INFUSION THERAPY | Age: 42
Discharge: HOME | End: 2022-09-26
Payer: COMMERCIAL

## 2022-09-26 ENCOUNTER — CLINICAL DOCUMENTATION (OUTPATIENT)
Dept: CASE MANAGEMENT | Age: 42
End: 2022-09-26

## 2022-09-26 ENCOUNTER — OFFICE VISIT (OUTPATIENT)
Dept: ONCOLOGY | Age: 42
End: 2022-09-26
Payer: COMMERCIAL

## 2022-09-26 VITALS
SYSTOLIC BLOOD PRESSURE: 100 MMHG | BODY MASS INDEX: 32.05 KG/M2 | TEMPERATURE: 98.2 F | OXYGEN SATURATION: 96 % | WEIGHT: 241.8 LBS | HEIGHT: 73 IN | RESPIRATION RATE: 16 BRPM | DIASTOLIC BLOOD PRESSURE: 69 MMHG | HEART RATE: 116 BPM

## 2022-09-26 DIAGNOSIS — R11.0 NAUSEA: Primary | ICD-10-CM

## 2022-09-26 DIAGNOSIS — C62.90 SEMINOMA (HCC): ICD-10-CM

## 2022-09-26 DIAGNOSIS — R11.0 NAUSEA: ICD-10-CM

## 2022-09-26 DIAGNOSIS — C62.90 SEMINOMA (HCC): Primary | ICD-10-CM

## 2022-09-26 LAB
ALBUMIN SERPL-MCNC: 3.5 G/DL (ref 3.5–5)
ALBUMIN/GLOB SERPL: 1 {RATIO} (ref 1.2–3.5)
ALP SERPL-CCNC: 82 U/L (ref 50–136)
ALT SERPL-CCNC: 32 U/L (ref 12–65)
ANION GAP SERPL CALC-SCNC: 8 MMOL/L (ref 4–13)
AST SERPL-CCNC: 9 U/L (ref 15–37)
BASOPHILS # BLD: 0 K/UL (ref 0–0.2)
BASOPHILS NFR BLD: 0 % (ref 0–2)
BILIRUB SERPL-MCNC: 0.8 MG/DL (ref 0.2–1.1)
BUN SERPL-MCNC: 29 MG/DL (ref 6–23)
CALCIUM SERPL-MCNC: 8.5 MG/DL (ref 8.3–10.4)
CHLORIDE SERPL-SCNC: 100 MMOL/L (ref 101–110)
CO2 SERPL-SCNC: 24 MMOL/L (ref 21–32)
CREAT SERPL-MCNC: 1.3 MG/DL (ref 0.8–1.5)
DIFFERENTIAL METHOD BLD: ABNORMAL
EOSINOPHIL # BLD: 0 K/UL (ref 0–0.8)
EOSINOPHIL NFR BLD: 0 % (ref 0.5–7.8)
ERYTHROCYTE [DISTWIDTH] IN BLOOD BY AUTOMATED COUNT: 15.7 % (ref 11.9–14.6)
GLOBULIN SER CALC-MCNC: 3.4 G/DL (ref 2.3–3.5)
GLUCOSE SERPL-MCNC: 319 MG/DL (ref 65–100)
HCT VFR BLD AUTO: 36.5 %
HGB BLD-MCNC: 13 G/DL (ref 13.6–17.2)
IMM GRANULOCYTES # BLD AUTO: 0 K/UL (ref 0–0.5)
IMM GRANULOCYTES NFR BLD AUTO: 1 % (ref 0–5)
LYMPHOCYTES # BLD: 0.7 K/UL (ref 0.5–4.6)
LYMPHOCYTES NFR BLD: 12 % (ref 13–44)
MCH RBC QN AUTO: 29.6 PG (ref 26.1–32.9)
MCHC RBC AUTO-ENTMCNC: 35.6 G/DL (ref 31.4–35)
MCV RBC AUTO: 83.1 FL (ref 79.6–97.8)
MONOCYTES # BLD: 0 K/UL (ref 0.1–1.3)
MONOCYTES NFR BLD: 1 % (ref 4–12)
NEUTS SEG # BLD: 5.2 K/UL (ref 1.7–8.2)
NEUTS SEG NFR BLD: 87 % (ref 43–78)
NRBC # BLD: 0 K/UL (ref 0–0.2)
PLATELET # BLD AUTO: 236 K/UL (ref 150–450)
PMV BLD AUTO: 9.4 FL (ref 9.4–12.3)
POTASSIUM SERPL-SCNC: 3.9 MMOL/L (ref 3.5–5.1)
PROT SERPL-MCNC: 6.9 G/DL (ref 6.3–8.2)
RBC # BLD AUTO: 4.39 M/UL (ref 4.23–5.6)
SODIUM SERPL-SCNC: 132 MMOL/L (ref 136–145)
WBC # BLD AUTO: 6 K/UL (ref 4.3–11.1)

## 2022-09-26 PROCEDURE — 99214 OFFICE O/P EST MOD 30 MIN: CPT | Performed by: NURSE PRACTITIONER

## 2022-09-26 PROCEDURE — 80053 COMPREHEN METABOLIC PANEL: CPT

## 2022-09-26 PROCEDURE — 96375 TX/PRO/DX INJ NEW DRUG ADDON: CPT

## 2022-09-26 PROCEDURE — 6360000002 HC RX W HCPCS: Performed by: INTERNAL MEDICINE

## 2022-09-26 PROCEDURE — 2580000003 HC RX 258: Performed by: INTERNAL MEDICINE

## 2022-09-26 PROCEDURE — 96409 CHEMO IV PUSH SNGL DRUG: CPT

## 2022-09-26 PROCEDURE — 6370000000 HC RX 637 (ALT 250 FOR IP): Performed by: INTERNAL MEDICINE

## 2022-09-26 PROCEDURE — 36591 DRAW BLOOD OFF VENOUS DEVICE: CPT

## 2022-09-26 PROCEDURE — 85025 COMPLETE CBC W/AUTO DIFF WBC: CPT

## 2022-09-26 RX ORDER — SODIUM CHLORIDE 0.9 % (FLUSH) 0.9 %
10 SYRINGE (ML) INJECTION PRN
Status: DISCONTINUED | OUTPATIENT
Start: 2022-09-26 | End: 2022-09-27 | Stop reason: HOSPADM

## 2022-09-26 RX ORDER — ACETAMINOPHEN 325 MG/1
650 TABLET ORAL ONCE
Status: COMPLETED | OUTPATIENT
Start: 2022-09-26 | End: 2022-09-26

## 2022-09-26 RX ORDER — SODIUM CHLORIDE 9 MG/ML
5-250 INJECTION, SOLUTION INTRAVENOUS PRN
Status: DISCONTINUED | OUTPATIENT
Start: 2022-09-26 | End: 2022-09-27 | Stop reason: HOSPADM

## 2022-09-26 RX ORDER — DIPHENHYDRAMINE HYDROCHLORIDE 50 MG/ML
25 INJECTION INTRAMUSCULAR; INTRAVENOUS ONCE
Status: COMPLETED | OUTPATIENT
Start: 2022-09-26 | End: 2022-09-26

## 2022-09-26 RX ORDER — SODIUM CHLORIDE 0.9 % (FLUSH) 0.9 %
5-40 SYRINGE (ML) INJECTION PRN
Status: DISCONTINUED | OUTPATIENT
Start: 2022-09-26 | End: 2022-09-27 | Stop reason: HOSPADM

## 2022-09-26 RX ADMIN — SODIUM CHLORIDE, PRESERVATIVE FREE 10 ML: 5 INJECTION INTRAVENOUS at 13:48

## 2022-09-26 RX ADMIN — BLEOMYCIN SULFATE 30 UNITS: 30 INJECTION, POWDER, LYOPHILIZED, FOR SOLUTION INTRAMUSCULAR; INTRAPLEURAL; INTRAVENOUS; SUBCUTANEOUS at 14:45

## 2022-09-26 RX ADMIN — Medication 10 ML: at 11:39

## 2022-09-26 RX ADMIN — DIPHENHYDRAMINE HYDROCHLORIDE 25 MG: 50 INJECTION INTRAMUSCULAR; INTRAVENOUS at 14:07

## 2022-09-26 RX ADMIN — ACETAMINOPHEN 650 MG: 325 TABLET, FILM COATED ORAL at 14:06

## 2022-09-26 ASSESSMENT — PATIENT HEALTH QUESTIONNAIRE - PHQ9
SUM OF ALL RESPONSES TO PHQ QUESTIONS 1-9: 0
2. FEELING DOWN, DEPRESSED OR HOPELESS: 0
SUM OF ALL RESPONSES TO PHQ QUESTIONS 1-9: 0
SUM OF ALL RESPONSES TO PHQ9 QUESTIONS 1 & 2: 0
1. LITTLE INTEREST OR PLEASURE IN DOING THINGS: 0
SUM OF ALL RESPONSES TO PHQ QUESTIONS 1-9: 0
SUM OF ALL RESPONSES TO PHQ QUESTIONS 1-9: 0

## 2022-09-26 ASSESSMENT — PAIN SCALES - GENERAL: PAINLEVEL_OUTOF10: 0

## 2022-09-26 NOTE — PROGRESS NOTES
9/26/22 saw pt today with Irina Alejandra NP for pre chemo c3d8 BEP. He is tolerating chemo well but reporting fatigue. Discussed activity and calorie intake. Proceed to infusion. Follow up in 1 week. Encouraged to call with any concerns. Navigation will continue to follow.

## 2022-09-26 NOTE — PROGRESS NOTES
Arrived to the [facility]. Bleomycin infusion completed. Patient tolerated well. Any issues or concerns during appointment: none. Patient aware of next infusion appointment on 10/03/2022 (date) at 80 (time). Discharged ambulatory.

## 2022-09-26 NOTE — PROGRESS NOTES
Endo Denies heat/cold intolerance, denies diabetes. MSK Back pain. Denies swollen legs, myalgias and falls. Psychiatric/Behavioral Denies depression and substance abuse. The patient is not nervous/anxious. No Known Allergies  Past Medical History:   Diagnosis Date    Benign essential hypertension     controlled with med    Diabetes mellitus type 2, noninsulin dependent ([facility] [address]     oral reliant; AVG -105; pt does have s.s of hypoglycemia but does not know what BS is when it gets low; last A1C    Fatty liver     per pt    Hyperlipidemia     managed with med    Testicular mass     Left     Past Surgical History:   Procedure Laterality Date    APPENDECTOMY      IR PORT PLACEMENT EQUAL OR GREATER THAN 5 YEARS  7/22/2022    IR PORT PLACEMENT EQUAL OR GREATER THAN 5 YEARS 7/22/2022 [facility]IOLOGY SPECIALS    KIDNEY STONE SURGERY Right 2011    TESTICLE REMOVAL Left 7/7/2022    LEFT RADICAL ORCHIECTOMY Davonna Peel APPROACH performed by Meghan Burgess MD at [facility] MAIN OR     History reviewed. No pertinent family history.   Social History     Socioeconomic History    Marital status:      Spouse name: Not on file    Number of children: Not on file    Years of education: Not on file    Highest education level: Not on file   Occupational History    Not on file   Tobacco Use    Smoking status: Never    Smokeless tobacco: Never   Vaping Use    Vaping Use: Never used   Substance and Sexual Activity    Alcohol use: Yes     Comment: one or two per week    Drug use: Never    Sexual activity: Not on file   Other Topics Concern    Not on file   Social History Narrative    Not on file     Social Determinants of Health     Financial Resource Strain: Not on file   Food Insecurity: Not on file   Transportation Needs: Not on file   Physical Activity: Not on file   Stress: Not on file   Social Connections: Not on file   Intimate Partner Violence: Not on file   Housing Stability: Not on file     Current Outpatient Medications   Medication Sig Dispense Refill    metroNIDAZOLE (METROGEL) 0.75 % gel Apply topically 2 times daily. 45 g 1    hydrocortisone (ANUSOL-HC) 2.5 % CREA rectal cream Apply 2-3 times a day 28 g 1    lidocaine-prilocaine (EMLA) 2.5-2.5 % cream Apply topically as needed. 1 each 2    ondansetron (ZOFRAN-ODT) 8 MG TBDP disintegrating tablet Place 1 tablet under the tongue every 8 hours as needed for Nausea or Vomiting 90 tablet 2    prochlorperazine (COMPAZINE) 10 MG tablet Take 1 tablet by mouth every 6 hours as needed (cinv) 90 tablet 2    telmisartan-amLODIPine (TWYNSTA) 80-5 MG TABS tablet Take 1 tablet by mouth daily      atenolol (TENORMIN) 50 MG tablet Take 50 mg by mouth daily      atorvastatin (LIPITOR) 20 MG tablet Take 20 mg by mouth nightly      metFORMIN (GLUCOPHAGE-XR) 500 MG extended release tablet Take 2 with breakfast and supper - Diabetes      spironolactone (ALDACTONE) 50 MG tablet Take 50 mg by mouth daily       No current facility-administered medications for this visit. Facility-Administered Medications Ordered in Other Visits   Medication Dose Route Frequency Provider Last Rate Last Admin    sodium chloride flush 0.9 % injection 10 mL  10 mL IntraVENous PRN Iva Murphy MD   10 mL at 09/26/22 1139       OBJECTIVE:  /69 (Site: Left Upper Arm, Position: Standing, Cuff Size: Large Adult)   Pulse (!) 116   Temp 98.2 °F (36.8 °C)   Resp 16   Ht 6' 1\" (1.854 m)   Wt 241 lb 12.8 oz (109.7 kg)   SpO2 96%   BMI 31.90 kg/m²     Physical Exam:  Constitutional: Oriented to person, place, and time. Well-developed and well-nourished. HEENT: Normocephalic and atraumatic. Oropharynx is clear and moist.   Conjunctivae and EOM are normal. Pupils are equal, round, and reactive to light. No scleral icterus. Neck supple. No JVD present. No tracheal deviation present. No thyromegaly present.     Lymph node No palpable submandibular, cervical, supraclavicular, axillary and inguinal lymph nodes. Skin Rash on scalp. Warm and dry. No bruising noted. No erythema. No pallor. Respiratory Effort normal and breath sounds normal.  No respiratory distress. No wheezes. No rales. No tenderness. CVS Normal rate, regular rhythm and normal heart sounds. Exam reveals no gallop, no friction and no rub. No murmur heard. Abdomen Status post left orchiectomy. Right testis soft nontender no mass. Soft. Bowel sounds are normal. Exhibits no distension. There is no tenderness. There is no rebound and no guarding. Neuro Normal reflexes. No cranial nerve deficit. Exhibits normal muscle tone, 5 of 5 strength of all extremities. MSK Normal range of motion in general.  No edema and no tenderness.    Psych Normal mood, affect, behavior, judgment and thought content      Labs:  Recent Results (from the past 24 hour(s))   CBC With Auto Differential    Collection Time: 09/26/22 11:30 AM   Result Value Ref Range    WBC 6.0 4.3 - 11.1 K/uL    RBC 4.39 4.23 - 5.6 M/uL    Hemoglobin 13.0 (L) 13.6 - 17.2 g/dL    Hematocrit 36.5 %    MCV 83.1 79.6 - 97.8 FL    MCH 29.6 26.1 - 32.9 PG    MCHC 35.6 (H) 31.4 - 35.0 g/dL    RDW 15.7 (H) 11.9 - 14.6 %    Platelets 863 912 - 903 K/uL    MPV 9.4 9.4 - 12.3 FL    nRBC 0.00 0.0 - 0.2 K/uL    Differential Type AUTOMATED      Seg Neutrophils 87 (H) 43 - 78 %    Lymphocytes 12 (L) 13 - 44 %    Monocytes 1 (L) 4.0 - 12.0 %    Eosinophils % 0 (L) 0.5 - 7.8 %    Basophils 0 0.0 - 2.0 %    Immature Granulocytes 1 0.0 - 5.0 %    Segs Absolute 5.2 1.7 - 8.2 K/UL    Absolute Lymph # 0.7 0.5 - 4.6 K/UL    Absolute Mono # 0.0 (L) 0.1 - 1.3 K/UL    Absolute Eos # 0.0 0.0 - 0.8 K/UL    Basophils Absolute 0.0 0.0 - 0.2 K/UL    Absolute Immature Granulocyte 0.0 0.0 - 0.5 K/UL   Comprehensive Metabolic Panel    Collection Time: 09/26/22 11:30 AM   Result Value Ref Range    Sodium 132 (L) 136 - 145 mmol/L    Potassium 3.9 3.5 - 5.1 mmol/L    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, followed in office on 7/19/2022 and we discussed the result, beta-hCG and LDH increasing rapidly despite of the resection, increased back pain and night sweat, placed port and start BEP x3 7/25/2022, return on 8/1/2022, hCG down to 82, however not feeling well and lost 14 pounds in the week, not short of breath but reports some atypical chest pain that lasts for seconds each time, tachycardic and blood pressure lower than baseline, stat CT chest to rule out PE, arrange IV fluid for dehydration and received a day 8 bleomycin, return 8/8/2022, chest pain and short of breath resolved, but having mucositis with difficulty eating, prescribed Peridex and Magic mouthwash, discussed ways to improve nutrition and hydration, return on 9/19/2022, labs reviewed and proceed to cycle 3-day 1, right posterior iliac pain and monitor, continue Flagyl cream for rash, skin pigmentation no clinical concern, Ambien as needed for insomnia, return next week but call as needed. 9/26/2022:  He is here today for FU and C3D8 Bleo. He has been okay overall. He has had gradual worsening of fatigue during treatment. He has also had shortness of breath which seems to be related to the fatigue. He is also not eating/drinking very well, weight has been wildly fluctuating, though felt to have fluid weight gain after the first day of cycle. He still has some intermittent atypical chest pain which is self limited, previous CT to r/o PE was negative in August.  He has nausea for 1-2 days after chemo. He is taking stool softeners for constipation and well controlled. He denies any fevers or other infectious symptoms. Labs reviewed and counts adequate for treatment. Cr up to 1.30, increase oral fluid intake at home. F/u next week for final chemo treatment, D15 of cycle. Baseline PFT WNL. All questions are answered to their satisfaction. They will call for further questions and concerns.     ECOG PERFORMANCE STATUS - 0-Fully active, able to carry on all pre-disease performance without restriction. Pain - Pain Score:   0 - No pain (fstigue-10)0 - No pain/10. Mild to moderate pain, requiring medication - see MAR     Fatigue - No flowsheet data found. Distress - No flowsheet data found. Total time independently spent on today's visit was 40min. This time included: face-to-face time evaluating the patient as well as additional non-face-to-face time spent on: Preparing to see the patient by obtaining and reviewing previous test results, records and medical history, Performing a medically appropriate history and exam and documenting relevant clinical information for this visit, Counseling and educating patient and family, Ordering medications, Communicating with other health care professionals and Referring patient to another health care provider. Elements of this note have been dictated via voice recognition software. Text and phrases may be limited by the accuracy and autoconversion of the software. The chart has been reviewed, but errors may still be present.           ROSEMARY Enrique - NP  [address]  Office : [phone]  Fax : [phone]

## 2022-09-26 NOTE — PATIENT INSTRUCTIONS
Patient Instructions from Today's Visit    Reason for Visit:  Pre chemo cycle 3 day 8 BEP    Plan:  Proceed with infusion     Follow Up:  1 week    Recent Lab Results:  Hospital Outpatient Visit on 09/26/2022   Component Date Value Ref Range Status    WBC 09/26/2022 6.0  4.3 - 11.1 K/uL Final    RBC 09/26/2022 4.39  4.23 - 5.6 M/uL Final    Hemoglobin 09/26/2022 13.0 (A)  13.6 - 17.2 g/dL Final    Hematocrit 09/26/2022 36.5  % Final    MCV 09/26/2022 83.1  79.6 - 97.8 FL Final    MCH 09/26/2022 29.6  26.1 - 32.9 PG Final    MCHC 09/26/2022 35.6 (A)  31.4 - 35.0 g/dL Final    RDW 09/26/2022 15.7 (A)  11.9 - 14.6 % Final    Platelets 64/96/4155 236  150 - 450 K/uL Final    MPV 09/26/2022 9.4  9.4 - 12.3 FL Final    nRBC 09/26/2022 0.00  0.0 - 0.2 K/uL Final    **Note: Absolute NRBC parameter is now reported with Hemogram**    Differential Type 09/26/2022 AUTOMATED    Final    Seg Neutrophils 09/26/2022 87 (A)  43 - 78 % Final    Lymphocytes 09/26/2022 12 (A)  13 - 44 % Final    Monocytes 09/26/2022 1 (A)  4.0 - 12.0 % Final    Eosinophils % 09/26/2022 0 (A)  0.5 - 7.8 % Final    Basophils 09/26/2022 0  0.0 - 2.0 % Final    Immature Granulocytes 09/26/2022 1  0.0 - 5.0 % Final    Segs Absolute 09/26/2022 5.2  1.7 - 8.2 K/UL Final    Absolute Lymph # 09/26/2022 0.7  0.5 - 4.6 K/UL Final    Absolute Mono # 09/26/2022 0.0 (A)  0.1 - 1.3 K/UL Final    Absolute Eos # 09/26/2022 0.0  0.0 - 0.8 K/UL Final    Basophils Absolute 09/26/2022 0.0  0.0 - 0.2 K/UL Final    Absolute Immature Granulocyte 09/26/2022 0.0  0.0 - 0.5 K/UL Final         Treatment Summary has been discussed and given to patient: no        -------------------------------------------------------------------------------------------------------------------  Please call our office at [phone] if you have any  of the following symptoms:   Fever of 100.5 or greater  Chills  Shortness of breath  Swelling or pain in one leg    After office hours an answering service is available and will contact a provider for emergencies or if you are experiencing any of the above symptoms. Patient did express an interest in My Chart. My Chart log in information explained on the after visit summary printout at the [facility] [address] desk.     Rojelio Horvath RN, BSN  Nurse Navigator  [phone] cell  [email or web address]

## 2022-10-03 ENCOUNTER — CLINICAL DOCUMENTATION (OUTPATIENT)
Dept: CASE MANAGEMENT | Age: 42
End: 2022-10-03

## 2022-10-03 ENCOUNTER — HOSPITAL ENCOUNTER (OUTPATIENT)
Dept: INFUSION THERAPY | Age: 42
Discharge: HOME | End: 2022-10-03

## 2022-10-03 ENCOUNTER — OFFICE VISIT (OUTPATIENT)
Dept: ONCOLOGY | Age: 42
End: 2022-10-03
Payer: COMMERCIAL

## 2022-10-03 ENCOUNTER — HOSPITAL ENCOUNTER (OUTPATIENT)
Dept: INFUSION THERAPY | Age: 42
Discharge: HOME | End: 2022-10-03
Payer: COMMERCIAL

## 2022-10-03 VITALS
WEIGHT: 248.1 LBS | BODY MASS INDEX: 32.88 KG/M2 | HEIGHT: 73 IN | OXYGEN SATURATION: 99 % | DIASTOLIC BLOOD PRESSURE: 82 MMHG | SYSTOLIC BLOOD PRESSURE: 115 MMHG | RESPIRATION RATE: 16 BRPM | HEART RATE: 83 BPM | TEMPERATURE: 97.5 F

## 2022-10-03 DIAGNOSIS — C62.90 SEMINOMA (HCC): Primary | ICD-10-CM

## 2022-10-03 DIAGNOSIS — D70.1 CHEMOTHERAPY-INDUCED NEUTROPENIA (HCC): ICD-10-CM

## 2022-10-03 DIAGNOSIS — T45.1X5A CHEMOTHERAPY-INDUCED NEUTROPENIA (HCC): ICD-10-CM

## 2022-10-03 DIAGNOSIS — R10.11 RIGHT UPPER QUADRANT ABDOMINAL PAIN: ICD-10-CM

## 2022-10-03 DIAGNOSIS — Z79.899 HIGH RISK MEDICATION USE: ICD-10-CM

## 2022-10-03 DIAGNOSIS — C62.90 SEMINOMA (HCC): ICD-10-CM

## 2022-10-03 LAB
ALBUMIN SERPL-MCNC: 3.4 G/DL (ref 3.5–5)
ALBUMIN/GLOB SERPL: 1.1 {RATIO} (ref 1.2–3.5)
ALP SERPL-CCNC: 94 U/L (ref 50–136)
ALT SERPL-CCNC: 26 U/L (ref 12–65)
ANION GAP SERPL CALC-SCNC: 7 MMOL/L (ref 4–13)
AST SERPL-CCNC: 12 U/L (ref 15–37)
BASOPHILS # BLD: 0 K/UL (ref 0–0.2)
BASOPHILS NFR BLD: 1 % (ref 0–2)
BILIRUB SERPL-MCNC: 0.5 MG/DL (ref 0.2–1.1)
BUN SERPL-MCNC: 8 MG/DL (ref 6–23)
CALCIUM SERPL-MCNC: 8.8 MG/DL (ref 8.3–10.4)
CHLORIDE SERPL-SCNC: 107 MMOL/L (ref 101–110)
CO2 SERPL-SCNC: 25 MMOL/L (ref 21–32)
CREAT SERPL-MCNC: 1.1 MG/DL (ref 0.8–1.5)
DIFFERENTIAL METHOD BLD: ABNORMAL
EOSINOPHIL # BLD: 0.1 K/UL (ref 0–0.8)
EOSINOPHIL NFR BLD: 6 % (ref 0.5–7.8)
ERYTHROCYTE [DISTWIDTH] IN BLOOD BY AUTOMATED COUNT: 16 % (ref 11.9–14.6)
GLOBULIN SER CALC-MCNC: 3.2 G/DL (ref 2.3–3.5)
GLUCOSE SERPL-MCNC: 221 MG/DL (ref 65–100)
HCT VFR BLD AUTO: 30.1 %
HGB BLD-MCNC: 10.3 G/DL (ref 13.6–17.2)
IMM GRANULOCYTES # BLD AUTO: 0 K/UL (ref 0–0.5)
IMM GRANULOCYTES NFR BLD AUTO: 1 % (ref 0–5)
LYMPHOCYTES # BLD: 0.5 K/UL (ref 0.5–4.6)
LYMPHOCYTES NFR BLD: 61 % (ref 13–44)
MCH RBC QN AUTO: 29.7 PG (ref 26.1–32.9)
MCHC RBC AUTO-ENTMCNC: 34.2 G/DL (ref 31.4–35)
MCV RBC AUTO: 86.7 FL (ref 79.6–97.8)
MONOCYTES # BLD: 0.1 K/UL (ref 0.1–1.3)
MONOCYTES NFR BLD: 17 % (ref 4–12)
NEUTS SEG # BLD: 0.1 K/UL (ref 1.7–8.2)
NEUTS SEG NFR BLD: 14 % (ref 43–78)
NRBC # BLD: 0.04 K/UL (ref 0–0.2)
PLATELET # BLD AUTO: 67 K/UL (ref 150–450)
PMV BLD AUTO: 8.9 FL (ref 9.4–12.3)
POTASSIUM SERPL-SCNC: 3.7 MMOL/L (ref 3.5–5.1)
PROT SERPL-MCNC: 6.6 G/DL (ref 6.3–8.2)
RBC # BLD AUTO: 3.47 M/UL (ref 4.23–5.6)
SODIUM SERPL-SCNC: 139 MMOL/L (ref 136–145)
WBC # BLD AUTO: 0.8 K/UL (ref 4.3–11.1)

## 2022-10-03 PROCEDURE — 99215 OFFICE O/P EST HI 40 MIN: CPT | Performed by: INTERNAL MEDICINE

## 2022-10-03 PROCEDURE — 2580000003 HC RX 258: Performed by: INTERNAL MEDICINE

## 2022-10-03 PROCEDURE — 80053 COMPREHEN METABOLIC PANEL: CPT

## 2022-10-03 PROCEDURE — 36591 DRAW BLOOD OFF VENOUS DEVICE: CPT

## 2022-10-03 PROCEDURE — 85025 COMPLETE CBC W/AUTO DIFF WBC: CPT

## 2022-10-03 RX ORDER — SODIUM CHLORIDE 0.9 % (FLUSH) 0.9 %
10 SYRINGE (ML) INJECTION PRN
Status: DISCONTINUED | OUTPATIENT
Start: 2022-10-03 | End: 2022-10-04 | Stop reason: HOSPADM

## 2022-10-03 RX ORDER — CIPROFLOXACIN 500 MG/1
500 TABLET, FILM COATED ORAL 2 TIMES DAILY
Qty: 14 TABLET | Refills: 0 | Status: SHIPPED | OUTPATIENT
Start: 2022-10-03 | End: 2022-10-13

## 2022-10-03 RX ORDER — PANTOPRAZOLE SODIUM 40 MG/1
40 TABLET, DELAYED RELEASE ORAL DAILY
Qty: 30 TABLET | Refills: 1 | Status: SHIPPED | OUTPATIENT
Start: 2022-10-03

## 2022-10-03 RX ADMIN — SODIUM CHLORIDE, PRESERVATIVE FREE 10 ML: 5 INJECTION INTRAVENOUS at 09:47

## 2022-10-03 ASSESSMENT — PATIENT HEALTH QUESTIONNAIRE - PHQ9
SUM OF ALL RESPONSES TO PHQ9 QUESTIONS 1 & 2: 0
2. FEELING DOWN, DEPRESSED OR HOPELESS: 0
SUM OF ALL RESPONSES TO PHQ QUESTIONS 1-9: 0
SUM OF ALL RESPONSES TO PHQ QUESTIONS 1-9: 0
1. LITTLE INTEREST OR PLEASURE IN DOING THINGS: 0
SUM OF ALL RESPONSES TO PHQ QUESTIONS 1-9: 0
SUM OF ALL RESPONSES TO PHQ QUESTIONS 1-9: 0

## 2022-10-03 NOTE — PROGRESS NOTES
10/3/22 saw pt today with Dr. Anthony Wilson for pre chemo c3d15 BEP. , will delay 1 week. He is reporting abd discomfort and rectal pressure. He is having some issues with constipation. Cipro and protonix sent to pharmacy. Port needle removed. Follow up in 1 week. Encouraged to call with any concerns. Navigation will continue to follow.

## 2022-10-03 NOTE — PROGRESS NOTES
[facility] Hematology & Oncology: Office Visit Progress Note    Chief Complaint:    Testicular cancer    History of Present Illness:  38 y.o. male past medical history of hypertension, hyperlipidemia, diabetes, presented to the emergency room with testicle swelling and lower back and abdominal pain. Reported low back pain intermittently over the past month, did not have work-up yet. Now he noticed testicular swelling with pain and presented to the emergency room for further evaluation. Scrotal ultrasound showed large left side testicular mass highly concerning for testicular malignancy measuring 4.4 x 3.7 x 3.9 cm. CT showed massive retroperitoneal lymph nodes conglomerates and a largest retrocrural lymph nodes suspicious for lymphoma versus metastatic disease. There is severe luminal narrowing of the IVC given the mass-effect upon the IVC and mass-effect upon the abdominal aorta as well as the iliopsoas musculature. Oncology is consulted for recommendation. 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 Review of Systems:  Constitutional Anorexia, fatigue. Weight loss. Denies fever or chills. HEENT Denies trauma, bluring vision, hearing loss, ear pain, nosebleeds, sore throat, neck pain and ear discharge. Skin Rash on scalp. Denies rashes. Lungs Denies shortness of breath, cough, sputum production or hemoptysis. Cardiovascular Denies, palpitations, orthopnea, claudication and leg swelling. Gastrointestinal Abdominal pain. Denies nausea, vomiting, bowel changes. Denies bloody or black stools.  Status post left orchiectomy. Denies dysuria, frequency or hesitancy of urination   Neuro Denies headaches, visual changes or ataxia. Denies dizziness, tingling, tremors, sensory change, speech change, focal weakness and headaches.      Hematology Denies nasal/gum bleeding, denies easy bruise   Endo Denies heat/cold intolerance, denies diabetes. MSK Back pain. Denies swollen legs, myalgias and falls. Psychiatric/Behavioral Denies depression and substance abuse. The patient is not nervous/anxious. No Known Allergies  Past Medical History:   Diagnosis Date    Benign essential hypertension     controlled with med    Diabetes mellitus type 2, noninsulin dependent ([address]     oral reliant; AVG -105; pt does have s.s of hypoglycemia but does not know what BS is when it gets low; last A1C    Fatty liver     per pt    Hyperlipidemia     managed with med    Testicular mass     Left     Past Surgical History:   Procedure Laterality Date    APPENDECTOMY      IR PORT PLACEMENT EQUAL OR GREATER THAN 5 YEARS  7/22/2022    IR PORT PLACEMENT EQUAL OR GREATER THAN 5 YEARS 7/22/2022 [facility]IOLOGY SPECIALS    KIDNEY STONE SURGERY Right 2011    TESTICLE REMOVAL Left 7/7/2022    LEFT RADICAL ORCHIECTOMY Saralee Sheer APPROACH performed by Eunice Gonzalez MD at [facility] MAIN OR     History reviewed. No pertinent family history.   Social History     Socioeconomic History    Marital status:      Spouse name: Not on file    Number of children: Not on file    Years of education: Not on file    Highest education level: Not on file   Occupational History    Not on file   Tobacco Use    Smoking status: Never    Smokeless tobacco: Never   Vaping Use    Vaping Use: Never used   Substance and Sexual Activity    Alcohol use: Yes     Comment: one or two per week    Drug use: Never    Sexual activity: Not on file   Other Topics Concern    Not on file   Social History Narrative    Not on file     Social Determinants of Health     Financial Resource Strain: Not on file   Food Insecurity: Not on file   Transportation Needs: Not on file   Physical Activity: Not on file   Stress: Not on file   Social Connections: Not on file   Intimate Partner Violence: Not on file   Housing Stability: Not on file     Current Outpatient Medications Medication Sig Dispense Refill    ciprofloxacin (CIPRO) 500 MG tablet Take 1 tablet by mouth 2 times daily for 10 days 14 tablet 0    pantoprazole (PROTONIX) 40 MG tablet Take 1 tablet by mouth daily 30 tablet 1    metroNIDAZOLE (METROGEL) 0.75 % gel Apply topically 2 times daily. 45 g 1    hydrocortisone (ANUSOL-HC) 2.5 % CREA rectal cream Apply 2-3 times a day 28 g 1    lidocaine-prilocaine (EMLA) 2.5-2.5 % cream Apply topically as needed. 1 each 2    ondansetron (ZOFRAN-ODT) 8 MG TBDP disintegrating tablet Place 1 tablet under the tongue every 8 hours as needed for Nausea or Vomiting 90 tablet 2    prochlorperazine (COMPAZINE) 10 MG tablet Take 1 tablet by mouth every 6 hours as needed (cinv) 90 tablet 2    telmisartan-amLODIPine (TWYNSTA) 80-5 MG TABS tablet Take 1 tablet by mouth daily      atenolol (TENORMIN) 50 MG tablet Take 50 mg by mouth daily      atorvastatin (LIPITOR) 20 MG tablet Take 20 mg by mouth nightly      metFORMIN (GLUCOPHAGE-XR) 500 MG extended release tablet Take 2 with breakfast and supper - Diabetes      spironolactone (ALDACTONE) 50 MG tablet Take 50 mg by mouth daily       No current facility-administered medications for this visit. OBJECTIVE:  /82 (Site: Right Upper Arm, Position: Sitting, Cuff Size: Large Adult)   Pulse 83   Temp 97.5 °F (36.4 °C) (Oral)   Resp 16   Ht 6' 1\" (1.854 m)   Wt 248 lb 1.6 oz (112.5 kg)   SpO2 99%   BMI 32.73 kg/m²     Physical Exam:  Constitutional: Oriented to person, place, and time. Well-developed and well-nourished. HEENT: Normocephalic and atraumatic. Oropharynx is clear and moist.   Conjunctivae and EOM are normal. Pupils are equal, round, and reactive to light. No scleral icterus. Neck supple. No JVD present. No tracheal deviation present. No thyromegaly present. Lymph node No palpable submandibular, cervical, supraclavicular, axillary and inguinal lymph nodes. Skin Rash on scalp. Warm and dry. No bruising noted.   No erythema. No pallor. Respiratory Effort normal and breath sounds normal.  No respiratory distress. No wheezes. No rales. No tenderness. CVS Normal rate, regular rhythm and normal heart sounds. Exam reveals no gallop, no friction and no rub. No murmur heard. Abdomen Status post left orchiectomy. Right testis soft nontender no mass. Soft. Bowel sounds are normal. Exhibits no distension. There is no tenderness. There is no rebound and no guarding. Neuro Normal reflexes. No cranial nerve deficit. Exhibits normal muscle tone, 5 of 5 strength of all extremities. MSK Normal range of motion in general.  No edema and no tenderness.    Psych Normal mood, affect, behavior, judgment and thought content      Labs:  Recent Results (from the past 24 hour(s))   CBC with Auto Differential    Collection Time: 10/03/22  9:47 AM   Result Value Ref Range    WBC 0.8 (LL) 4.3 - 11.1 K/uL    RBC 3.47 (L) 4.23 - 5.6 M/uL    Hemoglobin 10.3 (L) 13.6 - 17.2 g/dL    Hematocrit 30.1 %    MCV 86.7 79.6 - 97.8 FL    MCH 29.7 26.1 - 32.9 PG    MCHC 34.2 31.4 - 35.0 g/dL    RDW 16.0 (H) 11.9 - 14.6 %    Platelets 67 (L) 844 - 450 K/uL    MPV 8.9 (L) 9.4 - 12.3 FL    nRBC 0.04 0.0 - 0.2 K/uL    Seg Neutrophils 14 (L) 43 - 78 %    Lymphocytes 61 (H) 13 - 44 %    Monocytes 17 (H) 4.0 - 12.0 %    Eosinophils % 6 0.5 - 7.8 %    Basophils 1 0.0 - 2.0 %    Immature Granulocytes 1 0.0 - 5.0 %    Segs Absolute 0.1 (L) 1.7 - 8.2 K/UL    Absolute Lymph # 0.5 0.5 - 4.6 K/UL    Absolute Mono # 0.1 0.1 - 1.3 K/UL    Absolute Eos # 0.1 0.0 - 0.8 K/UL    Basophils Absolute 0.0 0.0 - 0.2 K/UL    Absolute Immature Granulocyte 0.0 0.0 - 0.5 K/UL    Differential Type AUTOMATED     Comprehensive Metabolic Panel    Collection Time: 10/03/22  9:47 AM   Result Value Ref Range    Sodium 139 136 - 145 mmol/L    Potassium 3.7 3.5 - 5.1 mmol/L    Chloride 107 101 - 110 mmol/L    CO2 25 21 - 32 mmol/L    Anion Gap 7 4 - 13 mmol/L    Glucose 221 (H) 65 - 100 mg/dL    BUN 8 6 - 23 MG/DL    Creatinine 1.10 0.8 - 1.5 MG/DL    GFR African American >60 >60 ml/min/1.73m2    GFR Non- >60 >60 ml/min/1.73m2    Calcium 8.8 8.3 - 10.4 MG/DL    Total Bilirubin 0.5 0.2 - 1.1 MG/DL    ALT 26 12 - 65 U/L    AST 12 (L) 15 - 37 U/L    Alk Phosphatase 94 50 - 136 U/L    Total Protein 6.6 6.3 - 8.2 g/dL    Albumin 3.4 (L) 3.5 - 5.0 g/dL    Globulin 3.2 2.3 - 3.5 g/dL    Albumin/Globulin Ratio 1.1 (L) 1.2 - 3.5             Imaging:  No results found for this or any previous visit. ASSESSMENT/PLAN:   Diagnosis Orders   1. Seminoma ([address]  CBC with Auto Differential    Comprehensive Metabolic Panel      2. High risk medication use        3. Chemotherapy-induced neutropenia (HCC)        4.  Right upper quadrant abdominal pain                  38 y.o. 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, he need tumor markers for further diagnosis, patient and wife are tearful and discussed that this was a potentially rather curable cancer, typically would pursue inguinal radical orchiectomy for diagnosis and local disease control, nonetheless he is a typical case radiographically if beta-hCG is highly elevated, and the severe narrowing of IVC is of clinical concern of complication unless chemotherapy is started soon, discussed with uroncology and may start chemo urgently if needed, discussed options with patient and he agrees with starting chemotherapy as soon as possible to avoid further complication, elevated beta-hCG and normal AFP consistent with seminoma, completed sperm banking and received a left radical inguinal orchiectomy on 7/7/2022 and the pathology showed pure seminoma, followed in office on 7/19/2022 and we discussed the result, beta-hCG and LDH increasing rapidly despite of the resection, increased back pain and night sweat, placed port and start BEP x3 7/25/2022, return on 8/1/2022, hCG down to 82, however not feeling well and lost 14 pounds in the week, not short of breath but reports some atypical chest pain that lasts for seconds each time, tachycardic and blood pressure lower than baseline, stat CT chest to rule out PE, arrange IV fluid for dehydration and received a day 8 bleomycin, return 8/8/2022, chest pain and short of breath resolved, but having mucositis with difficulty eating, prescribed Peridex and Magic mouthwash, discussed ways to improve nutrition and hydration, urine on 10/3/2022 for cycle 3-day 15, neutropenic, right upper quadrant pain and lower abdominal pain, constipation with rectal pain, defer chemo next week, empirical Cipro for prevention of cholecystitis and colitis, laxatives for constipation control, Protonix for acid control, call or go to ER if developing fever, return next week. We will repeat CT with contrast and consider PET scan if remaining tumor over 3 cm. Baseline PFT WNL. All questions are answered to their satisfaction. They will call for further questions and concerns. ECOG PERFORMANCE STATUS - 0-Fully active, able to carry on all pre-disease performance without restriction. Pain - 4/10. Mild to moderate pain, requiring medication - see MAR     Fatigue - No flowsheet data found. Distress - No flowsheet data found. Total time independently spent on today's visit was 40min.  This time included: face-to-face time evaluating the patient as well as additional non-face-to-face time spent on: Preparing to see the patient by obtaining and reviewing previous test results, records and medical history, Performing a medically appropriate history and exam and documenting relevant clinical information for this visit, Counseling and educating patient and family, Ordering medications, Communicating with other health care professionals and Referring patient to another health care provider. Elements of this note have been dictated via voice recognition software. Text and phrases may be limited by the accuracy and autoconversion of the software. The chart has been reviewed, but errors may still be present. Muriel Sarmiento M.D.   [address]  Office : [phone]  Fax : [phone]

## 2022-10-03 NOTE — PATIENT INSTRUCTIONS
Patient Instructions from Today's Visit    Reason for Visit:  Pre chemo cycle 3 day 15 BEP    Plan:  Delay 1 week due to low white count  Make sure you use over the counter stool softeners to help with constipation   Cipro and protonix sent to [facility] for abdominal discomfort     Follow Up:  1 week for last chemo     Recent Lab Results:  Hospital Outpatient Visit on 10/03/2022   Component Date Value Ref Range Status    WBC 10/03/2022 0.8 (A)  4.3 - 11.1 K/uL Final    Comment: RESULTS VERIFIED, PHONED TO AND READ BACK BY  LUIS FLANNERY RN @ 1012 BY [facility] 10/03/22      RBC 10/03/2022 3.47 (A)  4.23 - 5.6 M/uL Final    Hemoglobin 10/03/2022 10.3 (A)  13.6 - 17.2 g/dL Final    RESULTS CHECKED X 2    Hematocrit 10/03/2022 30.1  % Final    MCV 10/03/2022 86.7  79.6 - 97.8 FL Final    MCH 10/03/2022 29.7  26.1 - 32.9 PG Final    MCHC 10/03/2022 34.2  31.4 - 35.0 g/dL Final    RDW 10/03/2022 16.0 (A)  11.9 - 14.6 % Final    Platelets 20/85/7569 67 (A)  150 - 450 K/uL Final    MPV 10/03/2022 8.9 (A)  9.4 - 12.3 FL Final    nRBC 10/03/2022 0.04  0.0 - 0.2 K/uL Final    **Note: Absolute NRBC parameter is now reported with Hemogram**    Seg Neutrophils 10/03/2022 14 (A)  43 - 78 % Final    Lymphocytes 10/03/2022 61 (A)  13 - 44 % Final    Monocytes 10/03/2022 17 (A)  4.0 - 12.0 % Final    Eosinophils % 10/03/2022 6  0.5 - 7.8 % Final    Basophils 10/03/2022 1  0.0 - 2.0 % Final    Immature Granulocytes 10/03/2022 1  0.0 - 5.0 % Final    Segs Absolute 10/03/2022 0.1 (A)  1.7 - 8.2 K/UL Final    Absolute Lymph # 10/03/2022 0.5  0.5 - 4.6 K/UL Final    Absolute Mono # 10/03/2022 0.1  0.1 - 1.3 K/UL Final    Absolute Eos # 10/03/2022 0.1  0.0 - 0.8 K/UL Final    Basophils Absolute 10/03/2022 0.0  0.0 - 0.2 K/UL Final    Absolute Immature Granulocyte 10/03/2022 0.0  0.0 - 0.5 K/UL Final    Differential Type 10/03/2022 AUTOMATED    Final         Treatment Summary has been discussed and given to patient: no        -------------------------------------------------------------------------------------------------------------------  Please call our office at [phone] if you have any  of the following symptoms:   Fever of 100.5 or greater  Chills  Shortness of breath  Swelling or pain in one leg    After office hours an answering service is available and will contact a provider for emergencies or if you are experiencing any of the above symptoms. Patient did express an interest in My Chart. My Chart log in information explained on the after visit summary printout at the [facility] [address] desk.     Simon Watson RN, BSN  Nurse Navigator  [phone] live [email or web address]

## 2022-10-10 ENCOUNTER — CLINICAL DOCUMENTATION (OUTPATIENT)
Dept: CASE MANAGEMENT | Age: 42
End: 2022-10-10

## 2022-10-10 ENCOUNTER — HOSPITAL ENCOUNTER (OUTPATIENT)
Dept: INFUSION THERAPY | Age: 42
Discharge: HOME | End: 2022-10-10
Payer: COMMERCIAL

## 2022-10-10 ENCOUNTER — OFFICE VISIT (OUTPATIENT)
Dept: ONCOLOGY | Age: 42
End: 2022-10-10
Payer: COMMERCIAL

## 2022-10-10 VITALS
WEIGHT: 248.6 LBS | BODY MASS INDEX: 32.95 KG/M2 | OXYGEN SATURATION: 100 % | HEIGHT: 73 IN | HEART RATE: 95 BPM | SYSTOLIC BLOOD PRESSURE: 107 MMHG | RESPIRATION RATE: 14 BRPM | TEMPERATURE: 98.2 F | DIASTOLIC BLOOD PRESSURE: 75 MMHG

## 2022-10-10 DIAGNOSIS — R10.11 RIGHT UPPER QUADRANT PAIN: ICD-10-CM

## 2022-10-10 DIAGNOSIS — R11.0 NAUSEA: Primary | ICD-10-CM

## 2022-10-10 DIAGNOSIS — C62.90 SEMINOMA (HCC): ICD-10-CM

## 2022-10-10 DIAGNOSIS — C62.90 SEMINOMA (HCC): Primary | ICD-10-CM

## 2022-10-10 DIAGNOSIS — D70.1 CHEMOTHERAPY-INDUCED NEUTROPENIA (HCC): ICD-10-CM

## 2022-10-10 DIAGNOSIS — K59.00 CONSTIPATION, UNSPECIFIED CONSTIPATION TYPE: ICD-10-CM

## 2022-10-10 DIAGNOSIS — T45.1X5A CHEMOTHERAPY-INDUCED NEUTROPENIA (HCC): ICD-10-CM

## 2022-10-10 DIAGNOSIS — Z79.899 HIGH RISK MEDICATION USE: ICD-10-CM

## 2022-10-10 DIAGNOSIS — K64.9 HEMORRHOIDS, UNSPECIFIED HEMORRHOID TYPE: ICD-10-CM

## 2022-10-10 LAB
ALBUMIN SERPL-MCNC: 3.7 G/DL (ref 3.5–5)
ALBUMIN/GLOB SERPL: 1.1 {RATIO} (ref 1.2–3.5)
ALP SERPL-CCNC: 87 U/L (ref 50–136)
ALT SERPL-CCNC: 32 U/L (ref 12–65)
ANION GAP SERPL CALC-SCNC: 9 MMOL/L (ref 4–13)
AST SERPL-CCNC: 19 U/L (ref 15–37)
BASOPHILS # BLD: 0 K/UL (ref 0–0.2)
BASOPHILS NFR BLD: 1 % (ref 0–2)
BILIRUB SERPL-MCNC: 0.5 MG/DL (ref 0.2–1.1)
BUN SERPL-MCNC: 11 MG/DL (ref 6–23)
CALCIUM SERPL-MCNC: 9.3 MG/DL (ref 8.3–10.4)
CHLORIDE SERPL-SCNC: 105 MMOL/L (ref 101–110)
CO2 SERPL-SCNC: 24 MMOL/L (ref 21–32)
CREAT SERPL-MCNC: 1.2 MG/DL (ref 0.8–1.5)
DIFFERENTIAL METHOD BLD: ABNORMAL
EOSINOPHIL # BLD: 0 K/UL (ref 0–0.8)
EOSINOPHIL NFR BLD: 0 % (ref 0.5–7.8)
ERYTHROCYTE [DISTWIDTH] IN BLOOD BY AUTOMATED COUNT: 17.1 % (ref 11.9–14.6)
GLOBULIN SER CALC-MCNC: 3.5 G/DL (ref 2.3–3.5)
GLUCOSE SERPL-MCNC: 182 MG/DL (ref 65–100)
HCT VFR BLD AUTO: 32.5 %
HGB BLD-MCNC: 10.9 G/DL (ref 13.6–17.2)
IMM GRANULOCYTES # BLD AUTO: 0.5 K/UL (ref 0–0.5)
IMM GRANULOCYTES NFR BLD AUTO: 15 % (ref 0–5)
LYMPHOCYTES # BLD: 0.8 K/UL (ref 0.5–4.6)
LYMPHOCYTES NFR BLD: 25 % (ref 13–44)
MCH RBC QN AUTO: 29.8 PG (ref 26.1–32.9)
MCHC RBC AUTO-ENTMCNC: 33.5 G/DL (ref 31.4–35)
MCV RBC AUTO: 88.8 FL (ref 79.6–97.8)
MONOCYTES # BLD: 0.9 K/UL (ref 0.1–1.3)
MONOCYTES NFR BLD: 26 % (ref 4–12)
NEUTS SEG # BLD: 1.1 K/UL (ref 1.7–8.2)
NEUTS SEG NFR BLD: 33 % (ref 43–78)
NRBC # BLD: 0.04 K/UL (ref 0–0.2)
PLATELET # BLD AUTO: 379 K/UL (ref 150–450)
PLATELET COMMENT: ADEQUATE
PMV BLD AUTO: 8.8 FL (ref 9.4–12.3)
POTASSIUM SERPL-SCNC: 4.1 MMOL/L (ref 3.5–5.1)
PROT SERPL-MCNC: 7.2 G/DL (ref 6.3–8.2)
RBC # BLD AUTO: 3.66 M/UL (ref 4.23–5.6)
RBC MORPH BLD: ABNORMAL
SODIUM SERPL-SCNC: 138 MMOL/L (ref 136–145)
WBC # BLD AUTO: 3.3 K/UL (ref 4.3–11.1)
WBC MORPH BLD: ABNORMAL

## 2022-10-10 PROCEDURE — 80053 COMPREHEN METABOLIC PANEL: CPT

## 2022-10-10 PROCEDURE — 36591 DRAW BLOOD OFF VENOUS DEVICE: CPT

## 2022-10-10 PROCEDURE — 2580000003 HC RX 258: Performed by: INTERNAL MEDICINE

## 2022-10-10 PROCEDURE — 6370000000 HC RX 637 (ALT 250 FOR IP): Performed by: INTERNAL MEDICINE

## 2022-10-10 PROCEDURE — 6360000002 HC RX W HCPCS: Performed by: INTERNAL MEDICINE

## 2022-10-10 PROCEDURE — 96409 CHEMO IV PUSH SNGL DRUG: CPT

## 2022-10-10 PROCEDURE — 96375 TX/PRO/DX INJ NEW DRUG ADDON: CPT

## 2022-10-10 PROCEDURE — 85025 COMPLETE CBC W/AUTO DIFF WBC: CPT

## 2022-10-10 PROCEDURE — 99215 OFFICE O/P EST HI 40 MIN: CPT | Performed by: INTERNAL MEDICINE

## 2022-10-10 RX ORDER — HYDROCORTISONE 25 MG/G
CREAM TOPICAL
Qty: 28 G | Refills: 1 | Status: SHIPPED | OUTPATIENT
Start: 2022-10-10

## 2022-10-10 RX ORDER — ACETAMINOPHEN 325 MG/1
650 TABLET ORAL ONCE
Status: COMPLETED | OUTPATIENT
Start: 2022-10-10 | End: 2022-10-10

## 2022-10-10 RX ORDER — DIPHENHYDRAMINE HYDROCHLORIDE 50 MG/ML
25 INJECTION INTRAMUSCULAR; INTRAVENOUS ONCE
Status: COMPLETED | OUTPATIENT
Start: 2022-10-10 | End: 2022-10-10

## 2022-10-10 RX ORDER — SODIUM CHLORIDE 9 MG/ML
5-250 INJECTION, SOLUTION INTRAVENOUS PRN
Status: DISCONTINUED | OUTPATIENT
Start: 2022-10-10 | End: 2022-10-11 | Stop reason: HOSPADM

## 2022-10-10 RX ORDER — SODIUM CHLORIDE 0.9 % (FLUSH) 0.9 %
10 SYRINGE (ML) INJECTION PRN
Status: DISCONTINUED | OUTPATIENT
Start: 2022-10-10 | End: 2022-10-11 | Stop reason: HOSPADM

## 2022-10-10 RX ORDER — SODIUM CHLORIDE 0.9 % (FLUSH) 0.9 %
5-40 SYRINGE (ML) INJECTION PRN
Status: DISCONTINUED | OUTPATIENT
Start: 2022-10-10 | End: 2022-10-11 | Stop reason: HOSPADM

## 2022-10-10 RX ADMIN — ACETAMINOPHEN 650 MG: 325 TABLET, FILM COATED ORAL at 11:24

## 2022-10-10 RX ADMIN — Medication 10 ML: at 09:58

## 2022-10-10 RX ADMIN — BLEOMYCIN SULFATE 30 UNITS: 30 INJECTION, POWDER, LYOPHILIZED, FOR SOLUTION INTRAMUSCULAR; INTRAPLEURAL; INTRAVENOUS; SUBCUTANEOUS at 11:53

## 2022-10-10 RX ADMIN — DIPHENHYDRAMINE HYDROCHLORIDE 25 MG: 50 INJECTION, SOLUTION INTRAMUSCULAR; INTRAVENOUS at 11:24

## 2022-10-10 RX ADMIN — SODIUM CHLORIDE, PRESERVATIVE FREE 10 ML: 5 INJECTION INTRAVENOUS at 11:20

## 2022-10-10 ASSESSMENT — PAIN SCALES - GENERAL: PAINLEVEL_OUTOF10: 0

## 2022-10-10 NOTE — PROGRESS NOTES
10/10/22 saw pt today with Dr. Claudia Alcala for pre chemo cycle 3 day 15 BEP. He is tolerating chemo well. PO intake is good. Constipation improved, rectal discomfort better. Proceed to infusion. Follow up in 3 weeks with repeat CT and labs. Encouraged to call with any concerns. Navigation will continue to follow.

## 2022-10-10 NOTE — PROGRESS NOTES
[facility] Hematology & Oncology: Office Visit Progress Note    Chief Complaint:    Testicular cancer    History of Present Illness:  38 y.o. male past medical history of hypertension, hyperlipidemia, diabetes, presented to the emergency room with testicle swelling and lower back and abdominal pain. Reported low back pain intermittently over the past month, did not have work-up yet. Now he noticed testicular swelling with pain and presented to the emergency room for further evaluation. Scrotal ultrasound showed large left side testicular mass highly concerning for testicular malignancy measuring 4.4 x 3.7 x 3.9 cm. CT showed massive retroperitoneal lymph nodes conglomerates and a largest retrocrural lymph nodes suspicious for lymphoma versus metastatic disease. There is severe luminal narrowing of the IVC given the mass-effect upon the IVC and mass-effect upon the abdominal aorta as well as the iliopsoas musculature. Oncology is consulted for recommendation. 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 Review of Systems:  Constitutional Anorexia, fatigue. Weight loss. Denies fever or chills. HEENT Denies trauma, bluring vision, hearing loss, ear pain, nosebleeds, sore throat, neck pain and ear discharge. Skin Rash on scalp. Denies rashes. Lungs Denies shortness of breath, cough, sputum production or hemoptysis. Cardiovascular Denies, palpitations, orthopnea, claudication and leg swelling. Gastrointestinal Abdominal pain. Denies nausea, vomiting, bowel changes. Denies bloody or black stools.  Status post left orchiectomy. Denies dysuria, frequency or hesitancy of urination   Neuro Denies headaches, visual changes or ataxia. Denies dizziness, tingling, tremors, sensory change, speech change, focal weakness and headaches.      Hematology Denies nasal/gum bleeding, denies easy bruise   Endo Denies heat/cold intolerance, denies diabetes. MSK Back pain. Denies swollen legs, myalgias and falls. Psychiatric/Behavioral Denies depression and substance abuse. The patient is not nervous/anxious. No Known Allergies  Past Medical History:   Diagnosis Date    Benign essential hypertension     controlled with med    Diabetes mellitus type 2, noninsulin dependent ([address]     oral reliant; AVG -105; pt does have s.s of hypoglycemia but does not know what BS is when it gets low; last A1C    Fatty liver     per pt    Hyperlipidemia     managed with med    Testicular mass     Left     Past Surgical History:   Procedure Laterality Date    APPENDECTOMY      IR PORT PLACEMENT EQUAL OR GREATER THAN 5 YEARS  7/22/2022    IR PORT PLACEMENT EQUAL OR GREATER THAN 5 YEARS 7/22/2022 [facility] SPE[facility]    KIDNEY STONE SURGERY Right 2011    TESTICLE REMOVAL Left 7/7/2022    LEFT RADICAL ORCHIECTOMY Howard Jose APPROACH performed by Minda Peña MD at [facility] MAIN OR     History reviewed. No pertinent family history.   Social History     Socioeconomic History    Marital status:      Spouse name: Not on file    Number of children: Not on file    Years of education: Not on file    Highest education level: Not on file   Occupational History    Not on file   Tobacco Use    Smoking status: Never    Smokeless tobacco: Never   Vaping Use    Vaping Use: Never used   Substance and Sexual Activity    Alcohol use: Yes     Comment: one or two per week    Drug use: Never    Sexual activity: Not on file   Other Topics Concern    Not on file   Social History Narrative    Not on file     Social Determinants of Health     Financial Resource Strain: Not on file   Food Insecurity: Not on file   Transportation Needs: Not on file   Physical Activity: Not on file   Stress: Not on file   Social Connections: Not on file   Intimate Partner Violence: Not on file   Housing Stability: Not on file     Current Outpatient Medications Medication Sig Dispense Refill    hydrocortisone (ANUSOL-HC) 2.5 % CREA rectal cream Apply 2-3 times a day 28 g 1    ciprofloxacin (CIPRO) 500 MG tablet Take 1 tablet by mouth 2 times daily for 10 days 14 tablet 0    pantoprazole (PROTONIX) 40 MG tablet Take 1 tablet by mouth daily 30 tablet 1    lidocaine-prilocaine (EMLA) 2.5-2.5 % cream Apply topically as needed. 1 each 2    ondansetron (ZOFRAN-ODT) 8 MG TBDP disintegrating tablet Place 1 tablet under the tongue every 8 hours as needed for Nausea or Vomiting 90 tablet 2    prochlorperazine (COMPAZINE) 10 MG tablet Take 1 tablet by mouth every 6 hours as needed (cinv) 90 tablet 2    telmisartan-amLODIPine (TWYNSTA) 80-5 MG TABS tablet Take 1 tablet by mouth daily      atenolol (TENORMIN) 50 MG tablet Take 50 mg by mouth daily      atorvastatin (LIPITOR) 20 MG tablet Take 20 mg by mouth nightly      metFORMIN (GLUCOPHAGE-XR) 500 MG extended release tablet Take 2 with breakfast and supper - Diabetes      spironolactone (ALDACTONE) 50 MG tablet Take 50 mg by mouth daily      metroNIDAZOLE (METROGEL) 0.75 % gel Apply topically 2 times daily. (Patient not taking: Reported on 10/10/2022) 45 g 1     No current facility-administered medications for this visit. Facility-Administered Medications Ordered in Other Visits   Medication Dose Route Frequency Provider Last Rate Last Admin    sodium chloride flush 0.9 % injection 10 mL  10 mL IntraVENous PRN Ailyn Kahn MD   10 mL at 10/10/22 0958       OBJECTIVE:  /75 (Site: Right Upper Arm, Position: Standing)   Pulse 95   Temp 98.2 °F (36.8 °C)   Resp 14   Ht 6' 1\" (1.854 m)   Wt 248 lb 9.6 oz (112.8 kg)   SpO2 100%   BMI 32.80 kg/m²     Physical Exam:  Constitutional: Oriented to person, place, and time. Well-developed and well-nourished. HEENT: Normocephalic and atraumatic. Oropharynx is clear and moist.   Conjunctivae and EOM are normal. Pupils are equal, round, and reactive to light.  No scleral icterus. Neck supple. No JVD present. No tracheal deviation present. No thyromegaly present. Lymph node No palpable submandibular, cervical, supraclavicular, axillary and inguinal lymph nodes. Skin Rash on scalp. Warm and dry. No bruising noted. No erythema. No pallor. Respiratory Effort normal and breath sounds normal.  No respiratory distress. No wheezes. No rales. No tenderness. CVS Normal rate, regular rhythm and normal heart sounds. Exam reveals no gallop, no friction and no rub. No murmur heard. Abdomen Status post left orchiectomy. Right testis soft nontender no mass. Soft. Bowel sounds are normal. Exhibits no distension. There is no rebound and no guarding. Neuro Normal reflexes. No cranial nerve deficit. Exhibits normal muscle tone, 5 of 5 strength of all extremities. MSK Normal range of motion in general.  No edema and no tenderness.    Psych Normal mood, affect, behavior, judgment and thought content      Labs:  Recent Results (from the past 24 hour(s))   CBC with Auto Differential    Collection Time: 10/10/22  9:50 AM   Result Value Ref Range    WBC 3.3 (L) 4.3 - 11.1 K/uL    RBC 3.66 (L) 4.23 - 5.6 M/uL    Hemoglobin 10.9 (L) 13.6 - 17.2 g/dL    Hematocrit 32.5 %    MCV 88.8 79.6 - 97.8 FL    MCH 29.8 26.1 - 32.9 PG    MCHC 33.5 31.4 - 35.0 g/dL    RDW 17.1 (H) 11.9 - 14.6 %    Platelets 299 830 - 848 K/uL    MPV 8.8 (L) 9.4 - 12.3 FL    nRBC 0.04 0.0 - 0.2 K/uL    Seg Neutrophils 33 (L) 43 - 78 %    Lymphocytes 25 13 - 44 %    Monocytes 26 (H) 4.0 - 12.0 %    Eosinophils % 0 (L) 0.5 - 7.8 %    Basophils 1 0.0 - 2.0 %    Immature Granulocytes 15 (H) 0.0 - 5.0 %    Segs Absolute 1.1 (L) 1.7 - 8.2 K/UL    Absolute Lymph # 0.8 0.5 - 4.6 K/UL    Absolute Mono # 0.9 0.1 - 1.3 K/UL    Absolute Eos # 0.0 0.0 - 0.8 K/UL    Basophils Absolute 0.0 0.0 - 0.2 K/UL    Absolute Immature Granulocyte 0.5 0.0 - 0.5 K/UL    RBC Comment SLIGHT  ANISOCYTOSIS + POIKILOCYTOSIS        RBC Comment SLIGHT  POLYCHROMASIA        RBC Comment OCCASIONAL  OVALOCYTES        WBC Comment Result Confirmed By Smear      Platelet Comment ADEQUATE      Differential Type AUTOMATED     Comprehensive Metabolic Panel    Collection Time: 10/10/22  9:50 AM   Result Value Ref Range    Sodium 138 136 - 145 mmol/L    Potassium 4.1 3.5 - 5.1 mmol/L    Chloride 105 101 - 110 mmol/L    CO2 24 21 - 32 mmol/L    Anion Gap 9 4 - 13 mmol/L    Glucose 182 (H) 65 - 100 mg/dL    BUN 11 6 - 23 MG/DL    Creatinine 1.20 0.8 - 1.5 MG/DL    Est, Glom Filt Rate >60 >60 ml/min/1.73m2    Calcium 9.3 8.3 - 10.4 MG/DL    Total Bilirubin 0.5 0.2 - 1.1 MG/DL    ALT 32 12 - 65 U/L    AST 19 15 - 37 U/L    Alk Phosphatase 87 50 - 136 U/L    Total Protein 7.2 6.3 - 8.2 g/dL    Albumin 3.7 3.5 - 5.0 g/dL    Globulin 3.5 2.3 - 3.5 g/dL    Albumin/Globulin Ratio 1.1 (L) 1.2 - 3.5               Imaging:  No results found for this or any previous visit. ASSESSMENT/PLAN:   Diagnosis Orders   1. Seminoma [facility])  CT CHEST ABDOMEN PELVIS W CONTRAST Additional Contrast? Radiologist Recommendation    CBC with Auto Differential    Comprehensive Metabolic Panel    AFP Tumor Marker    HCG, Quantitative, Pregnancy    Lactate Dehydrogenase      2. Chemotherapy-induced neutropenia (HCC)        3. High risk medication use        4. Hemorrhoids, unspecified hemorrhoid type        5. Constipation, unspecified constipation type        6.  Right upper quadrant pain                    38 y.o. 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, he need tumor markers for further diagnosis, patient and wife are tearful and discussed that this was a potentially rather curable cancer, typically would pursue inguinal radical orchiectomy for diagnosis and local disease control, nonetheless he is a typical case radiographically if beta-hCG is highly elevated, and the severe narrowing of IVC is of clinical concern of complication unless chemotherapy is started soon, discussed with uroncology and may start chemo urgently if needed, discussed options with patient and he agrees with starting chemotherapy as soon as possible to avoid further complication, elevated beta-hCG and normal AFP consistent with seminoma, completed sperm banking and received a left radical inguinal orchiectomy on 7/7/2022 and the pathology showed pure seminoma, followed in office on 7/19/2022 and we discussed the result, beta-hCG and LDH increasing rapidly despite of the resection, increased back pain and night sweat, placed port and start BEP x3 7/25/2022, return on 8/1/2022, hCG down to 82, however not feeling well and lost 14 pounds in the week, not short of breath but reports some atypical chest pain that lasts for seconds each time, tachycardic and blood pressure lower than baseline, stat CT chest to rule out PE, arrange IV fluid for dehydration and received a day 8 bleomycin, return 8/8/2022, chest pain and short of breath resolved, but having mucositis with difficulty eating, prescribed Peridex and Magic mouthwash, discussed ways to improve nutrition and hydration, urine on 10/3/2022 for cycle 3-day 15, neutropenic, right upper quadrant pain and lower abdominal pain, constipation with rectal pain, defer chemo next week, empirical Cipro for prevention of cholecystitis and colitis, laxatives for constipation control, Protonix for acid control, return on 10/10/2022 and reported pain was better, but still feels swollen and remaining ache on the right upper quadrant, visual inspection and palpation do not appreciate any swelling but still few more sensitive to pressure, neutropenia recovered and proceed to cycle 3-day 15 and return in 2 weeks with repeat CT scan for restaging and evaluation of right upper quadrant pain, will consider PET scan if remaining tumor over 3 cm, if PET positive will consider biopsy/resection, laxative for constipation and refill Anusol for hemorrhoid, call as needed. Baseline PFT WNL. All questions are answered to their satisfaction. They will call for further questions and concerns. ECOG PERFORMANCE STATUS - 0-Fully active, able to carry on all pre-disease performance without restriction. Pain - 3/10. Mild to moderate pain, requiring medication - see MAR     Fatigue - No flowsheet data found. Distress - No flowsheet data found. Total time independently spent on today's visit was 40min. This time included: face-to-face time evaluating the patient as well as additional non-face-to-face time spent on: Preparing to see the patient by obtaining and reviewing previous test results, records and medical history, Performing a medically appropriate history and exam and documenting relevant clinical information for this visit, Counseling and educating patient and family, Ordering medications, Communicating with other health care professionals and Referring patient to another health care provider. Elements of this note have been dictated via voice recognition software. Text and phrases may be limited by the accuracy and autoconversion of the software. The chart has been reviewed, but errors may still be present. Rosana Adler M.D.   [address]  Office : [phone]  Fax : [phone]

## 2022-10-10 NOTE — PATIENT INSTRUCTIONS
Patient Instructions from Today's Visit    Reason for Visit:  Pre chemo cycle 3 day 15 BEP, last chemo     Plan:  Proceed to infusion     Follow Up:  3 weeks with repeat     Recent Lab Results:  Hospital Outpatient Visit on 10/10/2022   Component Date Value Ref Range Status    WBC 10/10/2022 3.3 (A)  4.3 - 11.1 K/uL Final    Comment: RESULTS CHECKED X 2  PERIPHERAL REVIEW TO FOLLOW      RBC 10/10/2022 3.66 (A)  4.23 - 5.6 M/uL Final    Hemoglobin 10/10/2022 10.9 (A)  13.6 - 17.2 g/dL Final    Hematocrit 10/10/2022 32.5  % Final    MCV 10/10/2022 88.8  79.6 - 97.8 FL Final    MCH 10/10/2022 29.8  26.1 - 32.9 PG Final    MCHC 10/10/2022 33.5  31.4 - 35.0 g/dL Final    RDW 10/10/2022 17.1 (A)  11.9 - 14.6 % Final    Platelets 41/75/5035 379  150 - 450 K/uL Final    MPV 10/10/2022 8.8 (A)  9.4 - 12.3 FL Final    nRBC 10/10/2022 0.04  0.0 - 0.2 K/uL Final    **Note: Absolute NRBC parameter is now reported with Hemogram**    Seg Neutrophils 10/10/2022 33 (A)  43 - 78 % Final    Lymphocytes 10/10/2022 25  13 - 44 % Final    Monocytes 10/10/2022 26 (A)  4.0 - 12.0 % Final    Eosinophils % 10/10/2022 0 (A)  0.5 - 7.8 % Final    Basophils 10/10/2022 1  0.0 - 2.0 % Final    Immature Granulocytes 10/10/2022 15 (A)  0.0 - 5.0 % Final    Segs Absolute 10/10/2022 1.1 (A)  1.7 - 8.2 K/UL Final    Absolute Lymph # 10/10/2022 0.8  0.5 - 4.6 K/UL Final    Absolute Mono # 10/10/2022 0.9  0.1 - 1.3 K/UL Final    Absolute Eos # 10/10/2022 0.0  0.0 - 0.8 K/UL Final    Basophils Absolute 10/10/2022 0.0  0.0 - 0.2 K/UL Final    Absolute Immature Granulocyte 10/10/2022 0.5  0.0 - 0.5 K/UL Final    RBC Comment 10/10/2022     Final                    Value:SLIGHT  ANISOCYTOSIS + POIKILOCYTOSIS      RBC Comment 10/10/2022     Final                    Value:SLIGHT  POLYCHROMASIA      RBC Comment 10/10/2022     Final                    Value:OCCASIONAL  OVALOCYTES      WBC Comment 10/10/2022 Result Confirmed By Smear    Final    WBC'S APPEAR ABNORMAL/IMMATURE/ATYPICAL    Platelet Comment 60/95/5109 ADEQUATE    Final    Differential Type 10/10/2022 AUTOMATED    Final    Sodium 10/10/2022 138  136 - 145 mmol/L Final    Potassium 10/10/2022 4.1  3.5 - 5.1 mmol/L Final    Chloride 10/10/2022 105  101 - 110 mmol/L Final    CO2 10/10/2022 24  21 - 32 mmol/L Final    Anion Gap 10/10/2022 9  4 - 13 mmol/L Final    Glucose 10/10/2022 182 (A)  65 - 100 mg/dL Final    BUN 10/10/2022 11  6 - 23 MG/DL Final    Creatinine 10/10/2022 1.20  0.8 - 1.5 MG/DL Final    Est, Glom Filt Rate 10/10/2022 >60  >60 ml/min/1.73m2 Final    Comment:      Pediatric calculator link: Valerie.at. org/professionals/kdoqi/gfr_calculatorped       Effective Oct 3, 2022       These results are not intended for use in patients <25years of age. eGFR results are calculated without a race factor using  the 2021 CKD-EPI equation. Careful clinical correlation is recommended, particularly when comparing to results calculated using previous equations. The CKD-EPI equation is less accurate in patients with extremes of muscle mass, extra-renal metabolism of creatinine, excessive creatine ingestion, or following therapy that affects renal tubular secretion.       Calcium 10/10/2022 9.3  8.3 - 10.4 MG/DL Final    Total Bilirubin 10/10/2022 0.5  0.2 - 1.1 MG/DL Final    ALT 10/10/2022 32  12 - 65 U/L Final    AST 10/10/2022 19  15 - 37 U/L Final    Alk Phosphatase 10/10/2022 87  50 - 136 U/L Final    Total Protein 10/10/2022 7.2  6.3 - 8.2 g/dL Final    Albumin 10/10/2022 3.7  3.5 - 5.0 g/dL Final    Globulin 10/10/2022 3.5  2.3 - 3.5 g/dL Final    Albumin/Globulin Ratio 10/10/2022 1.1 (A)  1.2 - 3.5   Final         Treatment Summary has been discussed and given to patient: no        -------------------------------------------------------------------------------------------------------------------  Please call our office at [phone] if you have any  of the following symptoms:   Fever of 100.5 or greater  Chills  Shortness of breath  Swelling or pain in one leg    After office hours an answering service is available and will contact a provider for emergencies or if you are experiencing any of the above symptoms. Patient did express an interest in My Chart. My Chart log in information explained on the after visit summary printout at the [facility] [address] desk.     Fe Puga RN, BSN  Nurse Navigator  [phone] cell  [email or web address]

## 2022-10-14 ENCOUNTER — TELEPHONE (OUTPATIENT)
Dept: ONCOLOGY | Age: 42
End: 2022-10-14

## 2022-10-14 NOTE — TELEPHONE ENCOUNTER
I reviewed the chart and the US report is in care everywhere. Msg sent to Dr Edd Griggs and Kaitlin Soni.

## 2022-10-14 NOTE — TELEPHONE ENCOUNTER
Myesha Beavers called from [place] [id number] in regards to an ultrasound for the pt. Wants to discuss the results with Dr. Declan Fall.

## 2022-10-20 DIAGNOSIS — C62.90 SEMINOMA (HCC): Primary | ICD-10-CM

## 2022-10-31 ENCOUNTER — HOSPITAL ENCOUNTER (OUTPATIENT)
Dept: CT IMAGING | Age: 42
Discharge: HOME | End: 2022-11-03
Payer: COMMERCIAL

## 2022-10-31 DIAGNOSIS — C62.90 SEMINOMA (HCC): ICD-10-CM

## 2022-10-31 PROCEDURE — 6360000004 HC RX CONTRAST MEDICATION: Performed by: INTERNAL MEDICINE

## 2022-10-31 PROCEDURE — 71260 CT THORAX DX C+: CPT

## 2022-10-31 RX ORDER — 0.9 % SODIUM CHLORIDE 0.9 %
100 INTRAVENOUS SOLUTION INTRAVENOUS
Status: DISCONTINUED | OUTPATIENT
Start: 2022-10-31 | End: 2022-11-04 | Stop reason: HOSPADM

## 2022-10-31 RX ORDER — SODIUM CHLORIDE 0.9 % (FLUSH) 0.9 %
10 SYRINGE (ML) INJECTION
Status: DISCONTINUED | OUTPATIENT
Start: 2022-10-31 | End: 2022-11-04 | Stop reason: HOSPADM

## 2022-10-31 RX ADMIN — DIATRIZOATE MEGLUMINE AND DIATRIZOATE SODIUM 15 ML: 660; 100 LIQUID ORAL; RECTAL at 10:07

## 2022-10-31 RX ADMIN — IOPAMIDOL 100 ML: 755 INJECTION, SOLUTION INTRAVENOUS at 10:06

## 2022-11-01 ENCOUNTER — HOSPITAL ENCOUNTER (OUTPATIENT)
Dept: LAB | Age: 42
Discharge: HOME | End: 2022-11-04
Payer: COMMERCIAL

## 2022-11-01 ENCOUNTER — OFFICE VISIT (OUTPATIENT)
Dept: ONCOLOGY | Age: 42
End: 2022-11-01
Payer: COMMERCIAL

## 2022-11-01 VITALS
HEIGHT: 73 IN | WEIGHT: 255.2 LBS | DIASTOLIC BLOOD PRESSURE: 74 MMHG | BODY MASS INDEX: 33.82 KG/M2 | OXYGEN SATURATION: 100 % | HEART RATE: 84 BPM | RESPIRATION RATE: 16 BRPM | SYSTOLIC BLOOD PRESSURE: 103 MMHG | TEMPERATURE: 98.8 F

## 2022-11-01 DIAGNOSIS — C62.90 MALIGNANT NEOPLASM OF TESTICLE, UNSPECIFIED LATERALITY, UNSPECIFIED WHETHER DESCENDED OR UNDESCENDED (HCC): Primary | ICD-10-CM

## 2022-11-01 DIAGNOSIS — C77.2 METASTASIS TO RETROPERITONEAL LYMPH NODE (HCC): ICD-10-CM

## 2022-11-01 DIAGNOSIS — C62.90 SEMINOMA (HCC): ICD-10-CM

## 2022-11-01 LAB
AFP-TM SERPL-MCNC: 2.8 NG/ML
ALBUMIN SERPL-MCNC: 3.9 G/DL (ref 3.5–5)
ALBUMIN/GLOB SERPL: 1.2 {RATIO} (ref 0.4–1.6)
ALP SERPL-CCNC: 87 U/L (ref 50–136)
ALT SERPL-CCNC: 35 U/L (ref 12–65)
ANION GAP SERPL CALC-SCNC: 7 MMOL/L (ref 2–11)
AST SERPL-CCNC: 17 U/L (ref 15–37)
BASOPHILS # BLD: 0.1 K/UL (ref 0–0.2)
BASOPHILS NFR BLD: 1 % (ref 0–2)
BILIRUB SERPL-MCNC: 0.3 MG/DL (ref 0.2–1.1)
BUN SERPL-MCNC: 12 MG/DL (ref 6–23)
CALCIUM SERPL-MCNC: 9.1 MG/DL (ref 8.3–10.4)
CHLORIDE SERPL-SCNC: 107 MMOL/L (ref 101–110)
CO2 SERPL-SCNC: 25 MMOL/L (ref 21–32)
CREAT SERPL-MCNC: 1 MG/DL (ref 0.8–1.5)
DIFFERENTIAL METHOD BLD: ABNORMAL
EOSINOPHIL # BLD: 0.1 K/UL (ref 0–0.8)
EOSINOPHIL NFR BLD: 1 % (ref 0.5–7.8)
ERYTHROCYTE [DISTWIDTH] IN BLOOD BY AUTOMATED COUNT: 14.2 % (ref 11.9–14.6)
GLOBULIN SER CALC-MCNC: 3.2 G/DL (ref 2.8–4.5)
GLUCOSE SERPL-MCNC: 168 MG/DL (ref 65–100)
HCG SERPL-ACNC: <1 MIU/ML (ref 0–2)
HCT VFR BLD AUTO: 35.6 %
HGB BLD-MCNC: 12.3 G/DL (ref 13.6–17.2)
IMM GRANULOCYTES # BLD AUTO: 0 K/UL (ref 0–0.5)
IMM GRANULOCYTES NFR BLD AUTO: 1 % (ref 0–5)
LDH SERPL L TO P-CCNC: 144 U/L (ref 100–190)
LYMPHOCYTES # BLD: 1.1 K/UL (ref 0.5–4.6)
LYMPHOCYTES NFR BLD: 17 % (ref 13–44)
MCH RBC QN AUTO: 31.1 PG (ref 26.1–32.9)
MCHC RBC AUTO-ENTMCNC: 34.6 G/DL (ref 31.4–35)
MCV RBC AUTO: 90.1 FL (ref 82–102)
MONOCYTES # BLD: 0.8 K/UL (ref 0.1–1.3)
MONOCYTES NFR BLD: 12 % (ref 4–12)
NEUTS SEG # BLD: 4.4 K/UL (ref 1.7–8.2)
NEUTS SEG NFR BLD: 68 % (ref 43–78)
NRBC # BLD: 0 K/UL (ref 0–0.2)
PLATELET # BLD AUTO: 172 K/UL (ref 150–450)
PMV BLD AUTO: 9.3 FL (ref 9.4–12.3)
POTASSIUM SERPL-SCNC: 3.8 MMOL/L (ref 3.5–5.1)
PROT SERPL-MCNC: 7.1 G/DL (ref 6.3–8.2)
RBC # BLD AUTO: 3.95 M/UL (ref 4.23–5.6)
SODIUM SERPL-SCNC: 139 MMOL/L (ref 133–143)
WBC # BLD AUTO: 6.4 K/UL (ref 4.3–11.1)

## 2022-11-01 PROCEDURE — 82105 ALPHA-FETOPROTEIN SERUM: CPT

## 2022-11-01 PROCEDURE — 84702 CHORIONIC GONADOTROPIN TEST: CPT

## 2022-11-01 PROCEDURE — 99214 OFFICE O/P EST MOD 30 MIN: CPT | Performed by: INTERNAL MEDICINE

## 2022-11-01 PROCEDURE — 36415 COLL VENOUS BLD VENIPUNCTURE: CPT

## 2022-11-01 PROCEDURE — 85025 COMPLETE CBC W/AUTO DIFF WBC: CPT

## 2022-11-01 PROCEDURE — 3074F SYST BP LT 130 MM HG: CPT | Performed by: INTERNAL MEDICINE

## 2022-11-01 PROCEDURE — 3078F DIAST BP <80 MM HG: CPT | Performed by: INTERNAL MEDICINE

## 2022-11-01 PROCEDURE — 83615 LACTATE (LD) (LDH) ENZYME: CPT

## 2022-11-01 PROCEDURE — 80053 COMPREHEN METABOLIC PANEL: CPT

## 2022-11-01 ASSESSMENT — PATIENT HEALTH QUESTIONNAIRE - PHQ9
2. FEELING DOWN, DEPRESSED OR HOPELESS: 0
SUM OF ALL RESPONSES TO PHQ QUESTIONS 1-9: 0
SUM OF ALL RESPONSES TO PHQ9 QUESTIONS 1 & 2: 0
SUM OF ALL RESPONSES TO PHQ QUESTIONS 1-9: 0
1. LITTLE INTEREST OR PLEASURE IN DOING THINGS: 0
SUM OF ALL RESPONSES TO PHQ QUESTIONS 1-9: 0
SUM OF ALL RESPONSES TO PHQ QUESTIONS 1-9: 0

## 2022-11-01 NOTE — PATIENT INSTRUCTIONS
Patient Instructions from Today's Visit    Reason for Visit:  Follow up     Diagnosis Information:  [email or web address]://[email or web address]/. net/about-us/asco-answers-patient-education-materials/fqgx-jtvrynr-sxfv-sheets    Plan:  -Follow up w Dr Nila Fernandez after PET    Follow Up:  -as scheduled after PET    Recent Lab Results:  Hospital Outpatient Visit on 11/01/2022   Component Date Value Ref Range Status    WBC 11/01/2022 6.4  4.3 - 11.1 K/uL Final    RBC 11/01/2022 3.95 (A)  4.23 - 5.6 M/uL Final    Hemoglobin 11/01/2022 12.3 (A)  13.6 - 17.2 g/dL Final    Hematocrit 11/01/2022 35.6  % Final    MCV 11/01/2022 90.1  82.0 - 102.0 FL Final    MCH 11/01/2022 31.1  26.1 - 32.9 PG Final    MCHC 11/01/2022 34.6  31.4 - 35.0 g/dL Final    RDW 11/01/2022 14.2  11.9 - 14.6 % Final    Platelets 21/37/2194 172  150 - 450 K/uL Final    MPV 11/01/2022 9.3 (A)  9.4 - 12.3 FL Final    nRBC 11/01/2022 0.00  0.0 - 0.2 K/uL Final    **Note: Absolute NRBC parameter is now reported with Hemogram**    Seg Neutrophils 11/01/2022 68  43 - 78 % Final    Lymphocytes 11/01/2022 17  13 - 44 % Final    Monocytes 11/01/2022 12  4.0 - 12.0 % Final    Eosinophils % 11/01/2022 1  0.5 - 7.8 % Final    Basophils 11/01/2022 1  0.0 - 2.0 % Final    Immature Granulocytes 11/01/2022 1  0.0 - 5.0 % Final    Segs Absolute 11/01/2022 4.4  1.7 - 8.2 K/UL Final    Absolute Lymph # 11/01/2022 1.1  0.5 - 4.6 K/UL Final    Absolute Mono # 11/01/2022 0.8  0.1 - 1.3 K/UL Final    Absolute Eos # 11/01/2022 0.1  0.0 - 0.8 K/UL Final    Basophils Absolute 11/01/2022 0.1  0.0 - 0.2 K/UL Final    Absolute Immature Granulocyte 11/01/2022 0.0  0.0 - 0.5 K/UL Final    Differential Type 11/01/2022 AUTOMATED    Final        Treatment Summary has been discussed and given to patient: n/a        -------------------------------------------------------------------------------------------------------------------  Please call our office at [phone] if you have any  of the following symptoms: Fever of 100.5 or greater  Chills  Shortness of breath  Swelling or pain in one leg    After office hours an answering service is available and will contact a provider for emergencies or if you are experiencing any of the above symptoms. Patient does express an interest in My Chart. My Chart log in information explained on the after visit summary printout at the [facility] [address] desk.     JESUS Diallo RN, BSN  Nurse Navigator  [phone] cell  [email or web address]

## 2022-11-01 NOTE — PROGRESS NOTES
[facility]: Office Visit Progress Note    Chief Complaint:    Testicular cancer    History of Present Illness:  38 y.o. male past medical history of hypertension, hyperlipidemia, diabetes, presented to the emergency room with testicle swelling and lower back and abdominal pain. Reported low back pain intermittently over the past month, did not have work-up yet. Now he noticed testicular swelling with pain and presented to the emergency room for further evaluation. Scrotal ultrasound showed large left side testicular mass highly concerning for testicular malignancy measuring 4.4 x 3.7 x 3.9 cm. CT showed massive retroperitoneal lymph nodes conglomerates and a largest retrocrural lymph nodes suspicious for lymphoma versus metastatic disease. There is severe luminal narrowing of the IVC given the mass-effect upon the IVC and mass-effect upon the abdominal aorta as well as the iliopsoas musculature. Oncology is consulted for recommendation. 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 Review of Systems:  Constitutional Anorexia, fatigue. Weight loss. Denies fever or chills. HEENT Denies trauma, bluring vision, hearing loss, ear pain, nosebleeds, sore throat, neck pain and ear discharge. Skin Rash on scalp. Denies rashes. Lungs Denies shortness of breath, cough, sputum production or hemoptysis. Cardiovascular Denies, palpitations, orthopnea, claudication and leg swelling. Gastrointestinal Abdominal pain. Denies nausea, vomiting, bowel changes. Denies bloody or black stools.  Status post left orchiectomy. Denies dysuria, frequency or hesitancy of urination   Neuro Denies headaches, visual changes or ataxia. Denies dizziness, tingling, tremors, sensory change, speech change, focal weakness and headaches.      Hematology Denies nasal/gum bleeding, denies easy bruise   Endo Denies heat/cold intolerance, denies diabetes. MSK Back pain. Denies swollen legs, myalgias and falls. Psychiatric/Behavioral Denies depression and substance abuse. The patient is not nervous/anxious. No Known Allergies  Past Medical History:   Diagnosis Date    Benign essential hypertension     controlled with med    Diabetes mellitus type 2, noninsulin dependent ([address]     oral reliant; AVG -105; pt does have s.s of hypoglycemia but does not know what BS is when it gets low; last A1C    Fatty liver     per pt    Hyperlipidemia     managed with med    Testicular mass     Left     Past Surgical History:   Procedure Laterality Date    APPENDECTOMY      IR PORT PLACEMENT EQUAL OR GREATER THAN 5 YEARS  7/22/2022    IR PORT PLACEMENT EQUAL OR GREATER THAN 5 YEARS 7/22/2022 [facility]    KIDNEY STONE SURGERY Right 2011    TESTICLE REMOVAL Left 7/7/2022    LEFT RADICAL ORCHIECTOMY Kesha Primes APPROACH performed by Darra Severe, MD at [facility] MAIN OR     History reviewed. No pertinent family history.   Social History     Socioeconomic History    Marital status:      Spouse name: Not on file    Number of children: Not on file    Years of education: Not on file    Highest education level: Not on file   Occupational History    Not on file   Tobacco Use    Smoking status: Never    Smokeless tobacco: Never   Vaping Use    Vaping Use: Never used   Substance and Sexual Activity    Alcohol use: Yes     Comment: one or two per week    Drug use: Never    Sexual activity: Not on file   Other Topics Concern    Not on file   Social History Narrative    Not on file     Social Determinants of Health     Financial Resource Strain: Not on file   Food Insecurity: Not on file   Transportation Needs: Not on file   Physical Activity: Not on file   Stress: Not on file   Social Connections: Not on file   Intimate Partner Violence: Not on file   Housing Stability: Not on file     Current Outpatient Medications Medication Sig Dispense Refill    hydrocortisone (ANUSOL-HC) 2.5 % CREA rectal cream Apply 2-3 times a day 28 g 1    pantoprazole (PROTONIX) 40 MG tablet Take 1 tablet by mouth daily 30 tablet 1    lidocaine-prilocaine (EMLA) 2.5-2.5 % cream Apply topically as needed. 1 each 2    ondansetron (ZOFRAN-ODT) 8 MG TBDP disintegrating tablet Place 1 tablet under the tongue every 8 hours as needed for Nausea or Vomiting 90 tablet 2    prochlorperazine (COMPAZINE) 10 MG tablet Take 1 tablet by mouth every 6 hours as needed (cinv) 90 tablet 2    telmisartan-amLODIPine (TWYNSTA) 80-5 MG TABS tablet Take 1 tablet by mouth daily      atenolol (TENORMIN) 50 MG tablet Take 50 mg by mouth daily      atorvastatin (LIPITOR) 20 MG tablet Take 20 mg by mouth nightly      metFORMIN (GLUCOPHAGE-XR) 500 MG extended release tablet Take 2 with breakfast and supper - Diabetes      spironolactone (ALDACTONE) 50 MG tablet Take 50 mg by mouth daily      metroNIDAZOLE (METROGEL) 0.75 % gel Apply topically 2 times daily. (Patient not taking: No sig reported) 45 g 1     No current facility-administered medications for this visit. Facility-Administered Medications Ordered in Other Visits   Medication Dose Route Frequency Provider Last Rate Last Admin    0.9 % sodium chloride bolus  100 mL IntraVENous ONCE PRN Lnio Starks MD        sodium chloride flush 0.9 % injection 10 mL  10 mL IntraVENous ONCE PRN Lino Starks MD        diatrizoate meglumine-sodium (GASTROGRAFIN) 66-10 % solution 15 mL  15 mL Oral ONCE PRN Lino Starks MD   15 mL at 10/31/22 1007       OBJECTIVE:  /74 (Site: Left Upper Arm, Position: Standing, Cuff Size: Large Adult)   Pulse 84   Temp 98.8 °F (37.1 °C) (Oral)   Resp 16   Ht 6' 1\" (1.854 m)   Wt 255 lb 3.2 oz (115.8 kg)   SpO2 100%   BMI 33.67 kg/m²     Physical Exam:  Constitutional: Oriented to person, place, and time. Well-developed and well-nourished. HEENT: Normocephalic and atraumatic. Oropharynx is clear and moist.   Conjunctivae and EOM are normal. Pupils are equal, round, and reactive to light. No scleral icterus. Neck supple. No JVD present. No tracheal deviation present. No thyromegaly present. Lymph node No palpable submandibular, cervical, supraclavicular, axillary and inguinal lymph nodes. Skin Rash on scalp. Warm and dry. No bruising noted. No erythema. No pallor. Respiratory Effort normal and breath sounds normal.  No respiratory distress. No wheezes. No rales. No tenderness. CVS Normal rate, regular rhythm and normal heart sounds. Exam reveals no gallop, no friction and no rub. No murmur heard. Abdomen Status post left orchiectomy. Right testis soft nontender no mass. Soft. Bowel sounds are normal. Exhibits no distension. There is no rebound and no guarding. Neuro Normal reflexes. No cranial nerve deficit. Exhibits normal muscle tone, 5 of 5 strength of all extremities. MSK Normal range of motion in general.  No edema and no tenderness.    Psych Normal mood, affect, behavior, judgment and thought content      Labs:  Recent Results (from the past 24 hour(s))   CBC with Auto Differential    Collection Time: 11/01/22  1:11 PM   Result Value Ref Range    WBC 6.4 4.3 - 11.1 K/uL    RBC 3.95 (L) 4.23 - 5.6 M/uL    Hemoglobin 12.3 (L) 13.6 - 17.2 g/dL    Hematocrit 35.6 %    MCV 90.1 82.0 - 102.0 FL    MCH 31.1 26.1 - 32.9 PG    MCHC 34.6 31.4 - 35.0 g/dL    RDW 14.2 11.9 - 14.6 %    Platelets 529 096 - 969 K/uL    MPV 9.3 (L) 9.4 - 12.3 FL    nRBC 0.00 0.0 - 0.2 K/uL    Seg Neutrophils 68 43 - 78 %    Lymphocytes 17 13 - 44 %    Monocytes 12 4.0 - 12.0 %    Eosinophils % 1 0.5 - 7.8 %    Basophils 1 0.0 - 2.0 %    Immature Granulocytes 1 0.0 - 5.0 %    Segs Absolute 4.4 1.7 - 8.2 K/UL    Absolute Lymph # 1.1 0.5 - 4.6 K/UL    Absolute Mono # 0.8 0.1 - 1.3 K/UL    Absolute Eos # 0.1 0.0 - 0.8 K/UL    Basophils Absolute 0.1 0.0 - 0.2 K/UL    Absolute Immature Granulocyte 0.0 0.0 - 0.5 K/UL    Differential Type AUTOMATED     Comprehensive Metabolic Panel    Collection Time: 11/01/22  1:11 PM   Result Value Ref Range    Sodium 139 133 - 143 mmol/L    Potassium 3.8 3.5 - 5.1 mmol/L    Chloride 107 101 - 110 mmol/L    CO2 25 21 - 32 mmol/L    Anion Gap 7 2 - 11 mmol/L    Glucose 168 (H) 65 - 100 mg/dL    BUN 12 6 - 23 MG/DL    Creatinine 1.00 0.8 - 1.5 MG/DL    Est, Glom Filt Rate >60 >60 ml/min/1.73m2    Calcium 9.1 8.3 - 10.4 MG/DL    Total Bilirubin 0.3 0.2 - 1.1 MG/DL    ALT 35 12 - 65 U/L    AST 17 15 - 37 U/L    Alk Phosphatase 87 50 - 136 U/L    Total Protein 7.1 6.3 - 8.2 g/dL    Albumin 3.9 3.5 - 5.0 g/dL    Globulin 3.2 2.8 - 4.5 g/dL    Albumin/Globulin Ratio 1.2 0.4 - 1.6     AFP Tumor Marker    Collection Time: 11/01/22  1:11 PM   Result Value Ref Range    AFP-Tumor Marker 2.80 <8.0 ng/mL   HCG, Quantitative, Pregnancy    Collection Time: 11/01/22  1:11 PM   Result Value Ref Range    hCG Quant <1 0.0 - 2.0 MIU/ML   Lactate Dehydrogenase    Collection Time: 11/01/22  1:11 PM   Result Value Ref Range     100 - 190 U/L             Imaging:  No results found for this or any previous visit. ASSESSMENT/PLAN:   Diagnosis Orders   1. Malignant neoplasm of testicle, unspecified laterality, unspecified whether descended or undescended ([address]  PET CT SKULL BASE TO MID THIGH      2.  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, he need tumor markers for further diagnosis, patient and wife are tearful and discussed that this was a potentially rather curable cancer, typically would pursue inguinal radical orchiectomy for diagnosis and local disease control, nonetheless he is a typical case radiographically if beta-hCG is highly elevated, and the severe narrowing of IVC is of clinical concern of complication unless chemotherapy is started soon, discussed with uroncology and may start chemo urgently if needed, discussed options with patient and he agrees with starting chemotherapy as soon as possible to avoid further complication, elevated beta-hCG and normal AFP consistent with seminoma, completed sperm banking and received a left radical inguinal orchiectomy on 7/7/2022 and the pathology showed pure seminoma, followed in office on 7/19/2022 and we discussed the result, beta-hCG and LDH increasing rapidly despite of the resection, increased back pain and night sweat, placed port and start BEP x3 7/25/2022, return on 8/1/2022, hCG down to 82, however not feeling well and lost 14 pounds in the week, not short of breath but reports some atypical chest pain that lasts for seconds each time, tachycardic and blood pressure lower than baseline, stat CT chest to rule out PE, arrange IV fluid for dehydration and received a day 8 bleomycin, return 8/8/2022, chest pain and short of breath resolved, but having mucositis with difficulty eating, prescribed Peridex and Magic mouthwash, discussed ways to improve nutrition and hydration, urine on 10/3/2022 for cycle 3-day 15, neutropenic, right upper quadrant pain and lower abdominal pain, constipation with rectal pain, defer chemo next week, empirical Cipro for prevention of cholecystitis and colitis, laxatives for constipation control, Protonix for acid control, return on 10/10/2022 and reported pain was better, but still feels swollen and remaining ache on the right upper quadrant, visual inspection and palpation do not appreciate any swelling but still felt more sensitive to pressure, neutropenia recovered and completed cycle 3 BEP, arrange to repeat CT evaluation showed major partial response with 2 remaining lymph node over 3 cm: We discussed the result indicates for PET scan to rule out remaining disease, and if PET scan is positive will consider biopsy/resection, arrange PET scan and discussion with Dr. Eric Salmeron, return in 2 weeks to discuss further decision, call as needed. Baseline PFT WNL. All questions are answered to their satisfaction. They will call for further questions and concerns. ECOG PERFORMANCE STATUS - 0-Fully active, able to carry on all pre-disease performance without restriction. Pain - 3/10. Mild to moderate pain, requiring medication - see MAR     Fatigue - No flowsheet data found. Distress - No flowsheet data found. Total time independently spent on today's visit was 30min. This time included: face-to-face time evaluating the patient as well as additional non-face-to-face time spent on: Preparing to see the patient by obtaining and reviewing previous test results, records and medical history, Performing a medically appropriate history and exam and documenting relevant clinical information for this visit, Counseling and educating patient and family, Ordering medications, Communicating with other health care professionals and Referring patient to another health care provider. Elements of this note have been dictated via voice recognition software. Text and phrases may be limited by the accuracy and autoconversion of the software. The chart has been reviewed, but errors may still be present. Azar Callejas M.D.   [address], [address] [place] [address]  Office : [phone]  Fax : [phone]

## 2022-11-03 DIAGNOSIS — C62.90 MALIGNANT NEOPLASM OF TESTICLE, UNSPECIFIED LATERALITY, UNSPECIFIED WHETHER DESCENDED OR UNDESCENDED (HCC): Primary | ICD-10-CM

## 2022-11-03 NOTE — PROGRESS NOTES
I saw pt on 11-1-22 with Dr Edd Griggs. Chemo regimen is complete. We reviewed CT scan more surgery may be needed but will have PET to define. Pt will meet with Dr Curtis Palacio to discuss. He is doing well overall no complaints. He has PET scheduled then appt kvng Palacio and Dr Edd Griggs upcoming in 2 weeks.  Navigation to follow

## 2022-11-10 NOTE — PROGRESS NOTES
[address] [facility] Hematology & Oncology  [id number] [facility]  Melissa, [address] [place] [address]  [phone]        Mr. Benoit Vinson is a 38 y.o. male with a diagnosis of testicular cancer. INTERVAL HISTORY: Patient is here today with his spouse. 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 He finished his chemotherapy on October 11, 2022. He underwent a PET scan on 11/11/2022. His retroperitoneal adenopathy has decreased in size. One area is 3.8 cm in largest diameter while the other is 2.7. In terms of SUV uptake the mediastinal baseline is 2.2.  1 area of retroperitoneal needle adenopathy is 2.9 the other SUV is 2.6. These are thought to likely be treatment effect related but there is a possibility of residual disease. He has no complaints today. He denies any weight loss flank pain or other issues. He tolerated the chemotherapy well although he reports it was rough on him. His hCG was less than 1 on 11/1/2022. He has serum tumor markers pending today. From previous note:  Patient is here today for follow-up after his left radical orchiectomy on 7/7/2022. He is recovered quite well. He has no complaints. He denies any pain. His bowels are normalizing. His hCG today is up to 332. His AFP 1.6. His . His pathology showed a 4.77 cm tumor in the left testicle. 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 We know he has extensive retroperitoneal adenopathy. He is having intermittent significant back pain that is likely related to that adenopathy. Copied from previous note(s):  38 y.o. male with metastatic testicular cancer  [id number]-- likely stage IIC, good risk. His primary tumor appears to be in his left testicle and he has impressive retroperitoneal adenopathy. Importantly, his AFP is normal and his beta-hCG is 184.   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 Although he has impressive retroperitoneal adenopathy that is compressing his vena cava, he is currently not in any distress. He has no signs of vena cava occlusion. He has no significant abdominal or flank pain. I do not think we need to emergently start chemotherapy. Instead I recommend he sperm bank (he wants this prior to chemotherapy) and then move forward with left radical orchiectomy on Thursday (risks of the procedure were discussed with the patient). That will enable us to determine whether or not this is a pure seminoma or a mixed germ cell tumor. He can hopefully then start chemotherapy 10 or so days later. He will most likely need BEP x 3 or EP x 4. Josiane Tolentino is a 38 y.o. male he noticed some abdominal fullness and nonspecific back and flank pain over the last several months. Last week he noticed his left testicle was enlarged and harder than it had been. He denies any cough or headache. He has not had any other systemic symptoms or weight loss. He underwent a CT scan that incidentally found large retroperitoneal adenopathy with compression of his IVC. He has had no lower extremity edema. He has no shortness of breath or chest pain. He had serum tumor markers checked that showed a beta-hCG of 184 and an AFP of 2. His creatinine is 1.1. He also had a scrotal ultrasound which shows a normal right testicle. His left testicle is is abnormal.  It is heterogeneous in appearance with an intratesticular solid mass involving the majority of the testicle measuring 4.3 x 3.7 x 3.9 cm. It is consistent with a primary germ cell tumor. He has no history of UDT. He has no family history of testicular malignancy. Past medical, family and social histories, as well as medications and allergies, were reviewed and updated in the medical record as appropriate.     PMH:     Past Medical History:   Diagnosis Date Benign essential hypertension     controlled with med    Diabetes mellitus type 2, noninsulin dependent ([address]     oral reliant; AVG -105; pt does have s.s of hypoglycemia but does not know what BS is when it gets low; last A1C    Fatty liver     per pt    Hyperlipidemia     managed with med    Testicular mass     Left       MEDs:     atenolol  atorvastatin  diatrizoate meglumine-sodium  lidocaine-prilocaine  metFORMIN  spironolactone  telmisartan-amLODIPine Tabs     ALLERGIES:    No Known Allergies    ROS:     Review of Systems   Constitutional: Negative. Negative for chills, fatigue and fever. Respiratory: Negative. Cardiovascular: Negative. Gastrointestinal: Negative. Genitourinary: Negative. Negative for difficulty urinating, dysuria, flank pain, frequency, hematuria and urgency. Musculoskeletal: Negative. All other systems reviewed and are negative. PHYSICAL EXAMINATION    /76 (Site: Left Upper Arm, Position: Sitting)   Pulse 71   Temp 98.1 °F (36.7 °C) (Oral)   Resp 18   Ht 6' 1\" (1.854 m)   Wt 262 lb 1.6 oz (118.9 kg)   SpO2 97%   BMI 34.58 kg/m²   General: well dressed, well nourished, no acute distress  Skin: no rashes  HEENT: Sclera are clear,normocephalic, atraumatic. no external lesions   Cardiovascular: Reg. Normal perfusion  Respiratory: normal respiratory effort, no JVD, no audible wheezing. Musculoskeletal: unremarkable with normal function. No embolic signs or cyanosis. Neurologic exam: intact, no focal deficits, moves all 4 extremities  Psych: normal mood and affect, alert, oriented x 3  LE:  no edema  GI: soft, nontender, no masses, no CVA tenderness  : Circumcised phallus without mass or lesion; solitary right testicle descended without mass or nodule; no inguinal hernia; no pelvic adenopathy            IMAGING:    XR Results:    === 07/01/22 ===    XR ABDOMEN (KUB) (SINGLE AP VIEW)    - Narrative -  Exam: Single view abdomen.   Indication: Abdominal pain and tenderness. Comparison: Abdomen and pelvis CT dated July 02, 2021. FINDINGS:  Soft tissue density along the psoas musculature known to represent  retroperitoneal adenopathy. Nonobstructive bowel gas pattern. No evidence of  acute osseous abnormality. No evidence of pneumatosis or pneumoperitoneum.    - Impression -  1. Non-obstructive bowel gas pattern. CT Results:    === 10/31/22 ===    CT CHEST ABDOMEN PELVIS W CONTRAST    - Narrative -  CT CHEST ABDOMEN PELVIS W CONTRAST 10/31/2022 10:06 AM    HISTORY: History of seminoma with right-sided back pain. Cancer staging CT. Status post chemotherapy. COMPARISON: CT chest 8/1/2022. CT abdomen pelvis 7/10/2022. Multiple axial images were obtained through the chest, abdomen, and pelvis. Oral contrast was used for bowel opacification. 100 mL of Isovue 370  intravenous contrast was used for better evaluation of solid organs and vascular  structures. Radiation dose reduction techniques were used for this study. All  CT scans performed at this facility use one or all of the following: Automated  exposure control, adjustment of the mA and/or kVp according to patient's size,  iterative reconstruction. FINDINGS:  LUNGS AND PLEURA: Lungs are clear. No pleural effusions. MEDIASTINUM/AXILLAE: Heart is normal in size. No pericardial fluid. Right chest  port in place. Esophagus and thyroid gland are unremarkable where imaged. No  enlarged lymph nodes. HEPATOBILIARY: There is diffuse fatty infiltration liver. No focal liver lesions  are appreciated. No calcified gallstones. PANCREAS: Normal.    SPLEEN: Normal.    ADRENAL GLANDS: Normal.    KIDNEYS/BLADDER: Normal.    BOWEL: No bowel obstruction. Areas of nonobstructing transient intussusception  noted in the left lateral abdomen on image 4 of series 2. No diverticulitis. Appendix not well visualized.     LYMPH NODES: Previously noted retroperitoneal adenopathy is again noted but  significantly improved. Left periaortic sherita conglomeration on image 84 of  series 2 now measures 4.0 x 2.8 cm, previously 8.6 x 7.0 cm. Retrocaval node on  image 74 now measures 4.4 x 3.1 cm, previously 6.6 x 6.1 cm. No enlarged pelvic  lymph nodes. No new or enlarging abdominal lymph nodes. VASCULATURE: Unremarkable. PELVIC ORGANS: Prostate and rectum are unremarkable. Left orchiectomy changes. MUSCULOSKELETAL: No destructive bone lesions are appreciated. - Impression -  1. Status post left orchiectomy. Enlarged retroperitoneal lymph nodes are still  present but diminished in size in the since prior exam consistent with a  response to interval chemotherapy. Continued surveillance as clinically  warranted. No evidence of metastatic disease in the chest or pelvis. 2. Fatty infiltration liver. No focal mass. PET CT skull to mid thigh 11/11/22  EXAMINATION: PET CT SKULL BASE TO MID THIGH 11/11/2022 11:43 AM       ACCESSION NUMBER: [id number]       COMPARISON: CT chest abdomen pelvis 10/31/2022. INDICATION: Testicular cancer diagnosed July 2022 status post left orchiectomy   and chemotherapy. TECHNIQUE:   Radiopharmaceutical: 14.2 mCi F18-FDG IV. Positron emission   tomography (PET) with concurrently acquired computed tomography (CT) for   attenuation correction and anatomical localization imaging. Images obtained from   the skull base to the mid thighs. .       These images do not constitute a diagnostic quality CT exam. The patient   underwent adequate fasting of at least 4 hours. Blood glucose at the time of   tracer administration is 126 mg/dl, which is adequate for imaging. Approximately   60 minutes after administration of the radiopharmaceutical, imaging was   initiated. SUV max is calculated from patient body weight. Noncaloric dilute   oral contrast is given.          For this CT scanner at least one of the following techniques is utilized to   decrease patient radiation exposure: Automatic exposure control, modulation of   MA and KVP based on patient weight, and iterative reconstruction. FINDINGS: Normal physiologic uptake is identified within the salivary glands,   myocardium, kidneys, ureters and bladder. Scattered areas of physiologic bowel   uptake are also present. NECK:   Lymph nodes: No pathologic activity. No enlarged cervical lymph nodes. Additional findings: None. CHEST:   Lungs: No pathologic activity. Lungs are clear. Lymph nodes: No pathologic activity. No enlarged thoracic lymph nodes. Additional findings: None. ABDOMEN/PELVIS:   Hepatobiliary: No pathologic activity. Spleen: No pathologic activity. Pancreas: No pathologic activity. Kidneys: No pathologic activity. Adrenals: No pathologic activity. Reproductive: No pathologic activity. Gastrointestinal: No pathologic activity. Lymph nodes: Retroperitoneal adenopathy is again noted. Necrotic node with low   level FDG activity in the surrounding rim of tissue measures 3.8 x 2.4 cm with   an SUV max 2.6. A left periaortic node on image 200 measures 2.7 x 1.7 cm with   an SUV max 2.9. Findings likely reflect treated adenopathy. These areas measure   slightly greater than mediastinal background activity of 2.2. Some residual   viable tumor cannot be completely ruled out but this has markedly improved since   prior CT from July 2022. No enlarged pelvic lymph nodes. Additional findings: Left orchiectomy changes. SKELETON:    No pathologic activity. Impression   1. Retroperitoneal adenopathy with intermediate FDG activity possibly   therapy-related change. Overall these have markedly improved in the interval   since prior CT from July 2022. Continued CT surveillance recommended to confirm   continued stability. No other evidence of metastatic disease in the neck, chest,   abdomen or pelvis following left orchiectomy. ASSESSMENT:    Mr. Belem Finnegan is a 38 y.o. male with a diagnosis of Stage IIC pure seminoma testicular cancer, s/p BEP x 3. He is doing well. I discussed his case with Dr. Sean Ramesh today. On my review of the PET scan I do not think it is consistent with a significant positive result. In my experience there have been higher SUV uptake's with positive PET scans. There still could be some inflammatory response from treatment effect. I recommended repeat CT imaging in 2 to 3 months. If there is any enlargement or other worrisome change will recommend a post chemo RPL ND. I will plan to present his case at our treatment planning conference. PLAN:   -pt completed chemotherapy  -PET scan on 11/11  -finished bleomycin on 10/11/22  -ct c/a/p w cont in 3 months with STMS, rtc to review   -  ________________________________________      I have seen and examined this patient. I have reviewed and edited the note started by the MA and agree with the outlined plan. Part of this note was written by using a voice dictation software. The note has been proof read but may still contain some grammatical/other typographical errors.       [facility]

## 2022-11-11 ENCOUNTER — HOSPITAL ENCOUNTER (OUTPATIENT)
Dept: PET IMAGING | Age: 42
Discharge: HOME | End: 2022-11-14
Payer: COMMERCIAL

## 2022-11-11 DIAGNOSIS — C62.90 MALIGNANT NEOPLASM OF TESTICLE, UNSPECIFIED LATERALITY, UNSPECIFIED WHETHER DESCENDED OR UNDESCENDED (HCC): ICD-10-CM

## 2022-11-11 LAB
GLUCOSE BLD STRIP.AUTO-MCNC: 126 MG/DL (ref 65–100)
SERVICE CMNT-IMP: ABNORMAL

## 2022-11-11 PROCEDURE — A9552 F18 FDG: HCPCS | Performed by: INTERNAL MEDICINE

## 2022-11-11 PROCEDURE — 6360000004 HC RX CONTRAST MEDICATION: Performed by: INTERNAL MEDICINE

## 2022-11-11 PROCEDURE — 3430000000 HC RX DIAGNOSTIC RADIOPHARMACEUTICAL: Performed by: INTERNAL MEDICINE

## 2022-11-11 PROCEDURE — 82962 GLUCOSE BLOOD TEST: CPT

## 2022-11-11 PROCEDURE — 78815 PET IMAGE W/CT SKULL-THIGH: CPT

## 2022-11-11 RX ORDER — FLUDEOXYGLUCOSE F 18 200 MCI/ML
10 INJECTION, SOLUTION INTRAVENOUS
Status: COMPLETED | OUTPATIENT
Start: 2022-11-11 | End: 2022-11-11

## 2022-11-11 RX ADMIN — FLUDEOXYGLUCOSE F 18 14.22 MILLICURIE: 200 INJECTION, SOLUTION INTRAVENOUS at 10:14

## 2022-11-11 RX ADMIN — DIATRIZOATE MEGLUMINE AND DIATRIZOATE SODIUM 10 ML: 660; 100 LIQUID ORAL; RECTAL at 10:22

## 2022-11-14 ENCOUNTER — OFFICE VISIT (OUTPATIENT)
Dept: ONCOLOGY | Age: 42
End: 2022-11-14
Payer: COMMERCIAL

## 2022-11-14 ENCOUNTER — HOSPITAL ENCOUNTER (OUTPATIENT)
Dept: LAB | Age: 42
Discharge: HOME | End: 2022-11-17
Payer: COMMERCIAL

## 2022-11-14 VITALS
SYSTOLIC BLOOD PRESSURE: 122 MMHG | TEMPERATURE: 98.1 F | BODY MASS INDEX: 34.74 KG/M2 | WEIGHT: 262.1 LBS | HEIGHT: 73 IN | HEART RATE: 71 BPM | RESPIRATION RATE: 18 BRPM | OXYGEN SATURATION: 97 % | DIASTOLIC BLOOD PRESSURE: 76 MMHG

## 2022-11-14 VITALS
BODY MASS INDEX: 34.57 KG/M2 | RESPIRATION RATE: 18 BRPM | HEART RATE: 71 BPM | TEMPERATURE: 98.1 F | DIASTOLIC BLOOD PRESSURE: 76 MMHG | WEIGHT: 262 LBS | OXYGEN SATURATION: 97 % | SYSTOLIC BLOOD PRESSURE: 122 MMHG

## 2022-11-14 DIAGNOSIS — C62.90 MALIGNANT NEOPLASM OF TESTICLE, UNSPECIFIED LATERALITY, UNSPECIFIED WHETHER DESCENDED OR UNDESCENDED (HCC): ICD-10-CM

## 2022-11-14 DIAGNOSIS — C62.90 MALIGNANT NEOPLASM OF TESTICLE, UNSPECIFIED LATERALITY, UNSPECIFIED WHETHER DESCENDED OR UNDESCENDED (HCC): Primary | ICD-10-CM

## 2022-11-14 DIAGNOSIS — C77.2 METASTASIS TO RETROPERITONEAL LYMPH NODE (HCC): ICD-10-CM

## 2022-11-14 LAB
AFP-TM SERPL-MCNC: 2.5 NG/ML
ALBUMIN SERPL-MCNC: 3.9 G/DL (ref 3.5–5)
ALBUMIN/GLOB SERPL: 1.2 {RATIO} (ref 0.4–1.6)
ALP SERPL-CCNC: 86 U/L (ref 50–136)
ALT SERPL-CCNC: 43 U/L (ref 12–65)
ANION GAP SERPL CALC-SCNC: 3 MMOL/L (ref 2–11)
AST SERPL-CCNC: 22 U/L (ref 15–37)
BASOPHILS # BLD: 0.1 K/UL (ref 0–0.2)
BASOPHILS NFR BLD: 1 % (ref 0–2)
BILIRUB SERPL-MCNC: 0.4 MG/DL (ref 0.2–1.1)
BUN SERPL-MCNC: 11 MG/DL (ref 6–23)
CALCIUM SERPL-MCNC: 9.2 MG/DL (ref 8.3–10.4)
CHLORIDE SERPL-SCNC: 107 MMOL/L (ref 101–110)
CO2 SERPL-SCNC: 28 MMOL/L (ref 21–32)
CREAT SERPL-MCNC: 1 MG/DL (ref 0.8–1.5)
DIFFERENTIAL METHOD BLD: ABNORMAL
EOSINOPHIL # BLD: 0.2 K/UL (ref 0–0.8)
EOSINOPHIL NFR BLD: 3 % (ref 0.5–7.8)
ERYTHROCYTE [DISTWIDTH] IN BLOOD BY AUTOMATED COUNT: 12.2 % (ref 11.9–14.6)
GLOBULIN SER CALC-MCNC: 3.2 G/DL (ref 2.8–4.5)
GLUCOSE SERPL-MCNC: 199 MG/DL (ref 65–100)
HCG SERPL QL: NEGATIVE
HCT VFR BLD AUTO: 36.7 %
HGB BLD-MCNC: 12.6 G/DL (ref 13.6–17.2)
IMM GRANULOCYTES # BLD AUTO: 0 K/UL (ref 0–0.5)
IMM GRANULOCYTES NFR BLD AUTO: 1 % (ref 0–5)
LYMPHOCYTES # BLD: 0.9 K/UL (ref 0.5–4.6)
LYMPHOCYTES NFR BLD: 17 % (ref 13–44)
MAGNESIUM SERPL-MCNC: 1.8 MG/DL (ref 1.8–2.4)
MCH RBC QN AUTO: 31.6 PG (ref 26.1–32.9)
MCHC RBC AUTO-ENTMCNC: 34.3 G/DL (ref 31.4–35)
MCV RBC AUTO: 92 FL (ref 82–102)
MONOCYTES # BLD: 0.6 K/UL (ref 0.1–1.3)
MONOCYTES NFR BLD: 11 % (ref 4–12)
NEUTS SEG # BLD: 3.5 K/UL (ref 1.7–8.2)
NEUTS SEG NFR BLD: 67 % (ref 43–78)
NRBC # BLD: 0 K/UL (ref 0–0.2)
PLATELET # BLD AUTO: 199 K/UL (ref 150–450)
PMV BLD AUTO: 8.5 FL (ref 9.4–12.3)
POTASSIUM SERPL-SCNC: 4.4 MMOL/L (ref 3.5–5.1)
PROT SERPL-MCNC: 7.1 G/DL (ref 6.3–8.2)
RBC # BLD AUTO: 3.99 M/UL (ref 4.23–5.6)
SODIUM SERPL-SCNC: 138 MMOL/L (ref 133–143)
WBC # BLD AUTO: 5.2 K/UL (ref 4.3–11.1)

## 2022-11-14 PROCEDURE — 84703 CHORIONIC GONADOTROPIN ASSAY: CPT

## 2022-11-14 PROCEDURE — 3074F SYST BP LT 130 MM HG: CPT | Performed by: UROLOGY

## 2022-11-14 PROCEDURE — 3078F DIAST BP <80 MM HG: CPT | Performed by: INTERNAL MEDICINE

## 2022-11-14 PROCEDURE — 82105 ALPHA-FETOPROTEIN SERUM: CPT

## 2022-11-14 PROCEDURE — 99214 OFFICE O/P EST MOD 30 MIN: CPT | Performed by: INTERNAL MEDICINE

## 2022-11-14 PROCEDURE — 3078F DIAST BP <80 MM HG: CPT | Performed by: UROLOGY

## 2022-11-14 PROCEDURE — 3074F SYST BP LT 130 MM HG: CPT | Performed by: INTERNAL MEDICINE

## 2022-11-14 PROCEDURE — 80053 COMPREHEN METABOLIC PANEL: CPT

## 2022-11-14 PROCEDURE — 36415 COLL VENOUS BLD VENIPUNCTURE: CPT

## 2022-11-14 PROCEDURE — 83735 ASSAY OF MAGNESIUM: CPT

## 2022-11-14 PROCEDURE — 85025 COMPLETE CBC W/AUTO DIFF WBC: CPT

## 2022-11-14 PROCEDURE — 99213 OFFICE O/P EST LOW 20 MIN: CPT | Performed by: UROLOGY

## 2022-11-14 ASSESSMENT — PATIENT HEALTH QUESTIONNAIRE - PHQ9
2. FEELING DOWN, DEPRESSED OR HOPELESS: 0
SUM OF ALL RESPONSES TO PHQ QUESTIONS 1-9: 0
SUM OF ALL RESPONSES TO PHQ QUESTIONS 1-9: 0
SUM OF ALL RESPONSES TO PHQ9 QUESTIONS 1 & 2: 0
SUM OF ALL RESPONSES TO PHQ QUESTIONS 1-9: 0
SUM OF ALL RESPONSES TO PHQ QUESTIONS 1-9: 0
1. LITTLE INTEREST OR PLEASURE IN DOING THINGS: 0

## 2022-11-14 ASSESSMENT — ENCOUNTER SYMPTOMS
RESPIRATORY NEGATIVE: 1
GASTROINTESTINAL NEGATIVE: 1

## 2022-11-14 NOTE — PATIENT INSTRUCTIONS
Patient Instructions from Today's Visit    Reason for Visit:  Follow up     Diagnosis Information:  [email or web address]      Plan: We would like to re image you in about 3 months. At this point we can further discuss if surgery is needed. The hope is the areas of concern continue to get smaller or stay the same. Follow Up:   In 3 months with scan prior     Recent Lab Results:  Results for orders placed or performed during the hospital encounter of 11/14/22   CBC with Auto Differential   Result Value Ref Range    WBC 5.2 4.3 - 11.1 K/uL    RBC 3.99 (L) 4.23 - 5.6 M/uL    Hemoglobin 12.6 (L) 13.6 - 17.2 g/dL    Hematocrit 36.7 %    MCV 92.0 82.0 - 102.0 FL    MCH 31.6 26.1 - 32.9 PG    MCHC 34.3 31.4 - 35.0 g/dL    RDW 12.2 11.9 - 14.6 %    Platelets 111 335 - 537 K/uL    MPV 8.5 (L) 9.4 - 12.3 FL    nRBC 0.00 0.0 - 0.2 K/uL    Differential Type AUTOMATED      Seg Neutrophils 67 43 - 78 %    Lymphocytes 17 13 - 44 %    Monocytes 11 4.0 - 12.0 %    Eosinophils % 3 0.5 - 7.8 %    Basophils 1 0.0 - 2.0 %    Immature Granulocytes 1 0.0 - 5.0 %    Segs Absolute 3.5 1.7 - 8.2 K/UL    Absolute Lymph # 0.9 0.5 - 4.6 K/UL    Absolute Mono # 0.6 0.1 - 1.3 K/UL    Absolute Eos # 0.2 0.0 - 0.8 K/UL    Basophils Absolute 0.1 0.0 - 0.2 K/UL    Absolute Immature Granulocyte 0.0 0.0 - 0.5 K/UL   Comprehensive Metabolic Panel   Result Value Ref Range    Sodium 138 133 - 143 mmol/L    Potassium 4.4 3.5 - 5.1 mmol/L    Chloride 107 101 - 110 mmol/L    CO2 28 21 - 32 mmol/L    Anion Gap 3 2 - 11 mmol/L    Glucose 199 (H) 65 - 100 mg/dL    BUN 11 6 - 23 MG/DL    Creatinine 1.00 0.8 - 1.5 MG/DL    Est, Glom Filt Rate >60 >60 ml/min/1.73m2    Calcium 9.2 8.3 - 10.4 MG/DL    Total Bilirubin 0.4 0.2 - 1.1 MG/DL    ALT 43 12 - 65 U/L    AST 22 15 - 37 U/L    Alk Phosphatase 86 50 - 136 U/L    Total Protein 7.1 6.3 - 8.2 g/dL    Albumin 3.9 3.5 - 5.0 g/dL Globulin 3.2 2.8 - 4.5 g/dL    Albumin/Globulin Ratio 1.2 0.4 - 1.6     Magnesium   Result Value Ref Range    Magnesium 1.8 1.8 - 2.4 mg/dL   AFP Tumor Marker   Result Value Ref Range    AFP-Tumor Marker 2.50 <8.0 ng/mL   HCG Qualitative, Serum   Result Value Ref Range    HCG, Ql. Negative     '    Treatment Summary has been discussed and given to patient: n/a        -------------------------------------------------------------------------------------------------------------------    Patient does express an interest in My Chart. My Chart log in information explained on the after visit summary printout at the . [address] desk.     [facility]

## 2022-11-14 NOTE — PROGRESS NOTES
[facility] Hematology & Oncology: Office Visit Progress Note    Chief Complaint:    Testicular cancer    History of Present Illness:  38 y.o. male past medical history of hypertension, hyperlipidemia, diabetes, presented to the emergency room with testicle swelling and lower back and abdominal pain. Reported low back pain intermittently over the past month, did not have work-up yet. Now he noticed testicular swelling with pain and presented to the emergency room for further evaluation. Scrotal ultrasound showed large left side testicular mass highly concerning for testicular malignancy measuring 4.4 x 3.7 x 3.9 cm. CT showed massive retroperitoneal lymph nodes conglomerates and a largest retrocrural lymph nodes suspicious for lymphoma versus metastatic disease. There is severe luminal narrowing of the IVC given the mass-effect upon the IVC and mass-effect upon the abdominal aorta as well as the iliopsoas musculature. Oncology is consulted for recommendation. 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 Review of Systems:  Constitutional Anorexia, fatigue. Weight loss. Denies fever or chills. HEENT Denies trauma, bluring vision, hearing loss, ear pain, nosebleeds, sore throat, neck pain and ear discharge. Skin Rash on scalp. Denies rashes. Lungs Denies shortness of breath, cough, sputum production or hemoptysis. Cardiovascular Denies, palpitations, orthopnea, claudication and leg swelling. Gastrointestinal Abdominal pain. Denies nausea, vomiting, bowel changes. Denies bloody or black stools.  Status post left orchiectomy. Denies dysuria, frequency or hesitancy of urination   Neuro Denies headaches, visual changes or ataxia. Denies dizziness, tingling, tremors, sensory change, speech change, focal weakness and headaches.      Hematology Denies nasal/gum bleeding, denies easy bruise   Endo Denies heat/cold intolerance, denies diabetes. MSK Back pain. Denies swollen legs, myalgias and falls. Psychiatric/Behavioral Denies depression and substance abuse. The patient is not nervous/anxious. No Known Allergies  Past Medical History:   Diagnosis Date    Benign essential hypertension     controlled with med    Diabetes mellitus type 2, noninsulin dependent ([address]     oral reliant; AVG -105; pt does have s.s of hypoglycemia but does not know what BS is when it gets low; last A1C    Fatty liver     per pt    Hyperlipidemia     managed with med    Testicular mass     Left     Past Surgical History:   Procedure Laterality Date    APPENDECTOMY      IR PORT PLACEMENT EQUAL OR GREATER THAN 5 YEARS  7/22/2022    IR PORT PLACEMENT EQUAL OR GREATER THAN 5 YEARS 7/22/2022 [facility] RADIOLOGY SPECIALS    KIDNEY STONE SURGERY Right 2011    TESTICLE REMOVAL Left 7/7/2022    LEFT RADICAL ORCHIECTOMY [facility] APPROACH performed by Eric Hernandez MD at [facility] MAIN OR     History reviewed. No pertinent family history.   Social History     Socioeconomic History    Marital status:      Spouse name: Not on file    Number of children: Not on file    Years of education: Not on file    Highest education level: Not on file   Occupational History    Not on file   Tobacco Use    Smoking status: Never    Smokeless tobacco: Never   Vaping Use    Vaping Use: Never used   Substance and Sexual Activity    Alcohol use: Yes     Comment: one or two per week    Drug use: Never    Sexual activity: Not on file   Other Topics Concern    Not on file   Social History Narrative    Not on file     Social Determinants of Health     Financial Resource Strain: Not on file   Food Insecurity: Not on file   Transportation Needs: Not on file   Physical Activity: Not on file   Stress: Not on file   Social Connections: Not on file   Intimate Partner Violence: Not on file   Housing Stability: Not on file     Current Outpatient Medications Medication Sig Dispense Refill    telmisartan-amLODIPine (TWYNSTA) 80-5 MG TABS tablet Take 1 tablet by mouth daily      atenolol (TENORMIN) 50 MG tablet Take 50 mg by mouth daily      atorvastatin (LIPITOR) 20 MG tablet Take 20 mg by mouth nightly      metFORMIN (GLUCOPHAGE-XR) 500 MG extended release tablet Take 2 with breakfast and supper - Diabetes      spironolactone (ALDACTONE) 50 MG tablet Take 50 mg by mouth daily      lidocaine-prilocaine (EMLA) 2.5-2.5 % cream Apply topically as needed. (Patient not taking: No sig reported) 1 each 2     No current facility-administered medications for this visit. Facility-Administered Medications Ordered in Other Visits   Medication Dose Route Frequency Provider Last Rate Last Admin    diatrizoate meglumine-sodium (GASTROGRAFIN) 66-10 % solution 10 mL  10 mL Oral ONCE PRN Jose Luis Cazares MD   10 mL at 11/11/22 1022       OBJECTIVE:  /76 (Site: Left Upper Arm, Position: Sitting)   Pulse 71   Temp 98.1 °F (36.7 °C) (Oral)   Resp 18   Wt 262 lb (118.8 kg)   SpO2 97%   BMI 34.57 kg/m²     Physical Exam:  Constitutional: Oriented to person, place, and time. Well-developed and well-nourished. HEENT: Normocephalic and atraumatic. Oropharynx is clear and moist.   Conjunctivae and EOM are normal. Pupils are equal, round, and reactive to light. No scleral icterus. Neck supple. No JVD present. No tracheal deviation present. No thyromegaly present. Lymph node No palpable submandibular, cervical, supraclavicular, axillary and inguinal lymph nodes. Skin Rash on scalp. Warm and dry. No bruising noted. No erythema. No pallor. Respiratory Effort normal and breath sounds normal.  No respiratory distress. No wheezes. No rales. No tenderness. CVS Normal rate, regular rhythm and normal heart sounds. Exam reveals no gallop, no friction and no rub. No murmur heard. Abdomen Status post left orchiectomy. Right testis soft nontender no mass. Soft. Bowel sounds are normal. Exhibits no distension. There is no rebound and no guarding. Neuro Normal reflexes. No cranial nerve deficit. Exhibits normal muscle tone, 5 of 5 strength of all extremities. MSK Normal range of motion in general.  No edema and no tenderness.    Psych Normal mood, affect, behavior, judgment and thought content      Labs:  Recent Results (from the past 24 hour(s))   CBC with Auto Differential    Collection Time: 11/14/22 12:45 PM   Result Value Ref Range    WBC 5.2 4.3 - 11.1 K/uL    RBC 3.99 (L) 4.23 - 5.6 M/uL    Hemoglobin 12.6 (L) 13.6 - 17.2 g/dL    Hematocrit 36.7 %    MCV 92.0 82.0 - 102.0 FL    MCH 31.6 26.1 - 32.9 PG    MCHC 34.3 31.4 - 35.0 g/dL    RDW 12.2 11.9 - 14.6 %    Platelets 141 518 - 855 K/uL    MPV 8.5 (L) 9.4 - 12.3 FL    nRBC 0.00 0.0 - 0.2 K/uL    Differential Type AUTOMATED      Seg Neutrophils 67 43 - 78 %    Lymphocytes 17 13 - 44 %    Monocytes 11 4.0 - 12.0 %    Eosinophils % 3 0.5 - 7.8 %    Basophils 1 0.0 - 2.0 %    Immature Granulocytes 1 0.0 - 5.0 %    Segs Absolute 3.5 1.7 - 8.2 K/UL    Absolute Lymph # 0.9 0.5 - 4.6 K/UL    Absolute Mono # 0.6 0.1 - 1.3 K/UL    Absolute Eos # 0.2 0.0 - 0.8 K/UL    Basophils Absolute 0.1 0.0 - 0.2 K/UL    Absolute Immature Granulocyte 0.0 0.0 - 0.5 K/UL   Comprehensive Metabolic Panel    Collection Time: 11/14/22 12:45 PM   Result Value Ref Range    Sodium 138 133 - 143 mmol/L    Potassium 4.4 3.5 - 5.1 mmol/L    Chloride 107 101 - 110 mmol/L    CO2 28 21 - 32 mmol/L    Anion Gap 3 2 - 11 mmol/L    Glucose 199 (H) 65 - 100 mg/dL    BUN 11 6 - 23 MG/DL    Creatinine 1.00 0.8 - 1.5 MG/DL    Est, Glom Filt Rate >60 >60 ml/min/1.73m2    Calcium 9.2 8.3 - 10.4 MG/DL    Total Bilirubin 0.4 0.2 - 1.1 MG/DL    ALT 43 12 - 65 U/L    AST 22 15 - 37 U/L    Alk Phosphatase 86 50 - 136 U/L    Total Protein 7.1 6.3 - 8.2 g/dL    Albumin 3.9 3.5 - 5.0 g/dL    Globulin 3.2 2.8 - 4.5 g/dL    Albumin/Globulin Ratio 1.2 0.4 - 1.6 Magnesium    Collection Time: 11/14/22 12:45 PM   Result Value Ref Range    Magnesium 1.8 1.8 - 2.4 mg/dL   AFP Tumor Marker    Collection Time: 11/14/22 12:45 PM   Result Value Ref Range    AFP-Tumor Marker 2.50 <8.0 ng/mL   HCG Qualitative, Serum    Collection Time: 11/14/22 12:45 PM   Result Value Ref Range    HCG, Ql. Negative               Imaging:  No results found for this or any previous visit. ASSESSMENT/PLAN:   Diagnosis Orders   1. Malignant neoplasm of testicle, unspecified laterality, unspecified whether descended or undescended [facility])  [address] [facility] [place]    HCG, Quantitative, Pregnancy      2.  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, he need tumor markers for further diagnosis, patient and wife are tearful and discussed that this was a potentially rather curable cancer, typically would pursue inguinal radical orchiectomy for diagnosis and local disease control, nonetheless he is a typical case radiographically if beta-hCG is highly elevated, and the severe narrowing of IVC is of clinical concern of complication unless chemotherapy is started soon, discussed with uroncology and may start chemo urgently if needed, discussed options with patient and he agrees with starting chemotherapy as soon as possible to avoid further complication, elevated beta-hCG and normal AFP consistent with seminoma, completed sperm banking and received a left radical inguinal orchiectomy on 7/7/2022 and the pathology showed pure seminoma, followed in office on 7/19/2022 and we discussed the result, beta-hCG and LDH increasing rapidly despite of the resection, increased back pain and night sweat, placed port and start BEP x3 7/25/2022, return on 8/1/2022, hCG down to 82, however not feeling well and lost 14 pounds in the week, not short of breath but reports some atypical chest pain that lasts for seconds each time, tachycardic and blood pressure lower than baseline, stat CT chest to rule out PE, arrange IV fluid for dehydration and received a day 8 bleomycin, return 8/8/2022, chest pain and short of breath resolved, but having mucositis with difficulty eating, prescribed Peridex and Magic mouthwash, discussed ways to improve nutrition and hydration, urine on 10/3/2022 for cycle 3-day 15, neutropenic, right upper quadrant pain and lower abdominal pain, constipation with rectal pain, defer chemo next week, empirical Cipro for prevention of cholecystitis and colitis, laxatives for constipation control, Protonix for acid control, return on 10/10/2022 and reported pain was better, but still feels swollen and remaining ache on the right upper quadrant, visual inspection and palpation do not appreciate any swelling but still felt more sensitive to pressure, neutropenia recovered and completed cycle 3 BEP, arrange to repeat CT evaluation showed major partial response with 2 remaining lymph node over 3 cm:         We discussed the result indicates for PET scan to rule out remaining disease, and PET scan 11/11/2022 confirmed markedly improved disease compared to baseline and necrotic retroperitoneal lymph node SUV 2.6 and a left para-aortic node SUV 2.9, I discussed with Dr. Geoff Lang and will patient/wife that these changes are rather borderline in terms of risk of remaining cancer and surgical intervention but the location remains high risk for surgery given the impact on IVC, necrotic appearance of the lymph node suggests the tumor still shrinking, all agreed to monitor for now and repeat CT in 3 months, meanwhile no restriction for life and work as he tolerates and recovers, lower back pain with left thigh numbness probably related to degenerative condition and refer to orthopedic evaluation, return in 3 months to follow CT result and hCG and further plan, call as needed. Baseline PFT WNL. All questions are answered to their satisfaction. They will call for further questions and concerns. ECOG PERFORMANCE STATUS - 0-Fully active, able to carry on all pre-disease performance without restriction. Pain - /10. Mild to moderate pain, requiring medication - see MAR     Fatigue - No flowsheet data found. Distress - No flowsheet data found. Total time independently spent on today's visit was 30min. This time included: face-to-face time evaluating the patient as well as additional non-face-to-face time spent on: Preparing to see the patient by obtaining and reviewing previous test results, records and medical history, Performing a medically appropriate history and exam and documenting relevant clinical information for this visit, Counseling and educating patient and family, Ordering medications, Communicating with other health care professionals and Referring patient to another health care provider. Elements of this note have been dictated via voice recognition software. Text and phrases may be limited by the accuracy and autoconversion of the software. The chart has been reviewed, but errors may still be present. Tyler Chilel M.D.   [address]  Office : [phone]  Fax : [phone]

## 2022-12-06 ENCOUNTER — TELEPHONE (OUTPATIENT)
Dept: ONCOLOGY | Age: 42
End: 2022-12-06

## 2022-12-06 NOTE — TELEPHONE ENCOUNTER
We discussed pt at [facility] this am.  All agree that PET is unlikely positive, especially considering neg STM's. I will see pt back in 2-3 months with repeat STM's and CT a/p. I will then likely repeat PET scan at following appt to ensure no increase in SUV activity. We will then follow NCCN surveillance guidelines.

## 2022-12-14 ENCOUNTER — OFFICE VISIT (OUTPATIENT)
Dept: ORTHOPEDIC SURGERY | Age: 42
End: 2022-12-14
Payer: COMMERCIAL

## 2022-12-14 DIAGNOSIS — C62.12 CANCER OF DESCENDED LEFT TESTIS (HCC): ICD-10-CM

## 2022-12-14 DIAGNOSIS — M40.204 KYPHOSIS OF THORACIC REGION, UNSPECIFIED KYPHOSIS TYPE: ICD-10-CM

## 2022-12-14 DIAGNOSIS — C77.2 METASTASIS TO RETROPERITONEAL LYMPH NODE (HCC): ICD-10-CM

## 2022-12-14 DIAGNOSIS — M54.50 LOW BACK PAIN, UNSPECIFIED BACK PAIN LATERALITY, UNSPECIFIED CHRONICITY, UNSPECIFIED WHETHER SCIATICA PRESENT: Primary | ICD-10-CM

## 2022-12-14 PROCEDURE — 99204 OFFICE O/P NEW MOD 45 MIN: CPT | Performed by: PHYSICIAN ASSISTANT

## 2022-12-14 RX ORDER — MELOXICAM 7.5 MG/1
7.5 TABLET ORAL DAILY
Qty: 30 TABLET | Refills: 0 | Status: SHIPPED | OUTPATIENT
Start: 2022-12-14

## 2022-12-14 NOTE — PROGRESS NOTES
An order for PT on the Lumbar Spine and Thoracic spine has been entered. An order for MRI of the Lumbar spine has been ordered.

## 2022-12-14 NOTE — LETTER
[address]                                                     KRISTEN VINES  1980  MRN [id number]                                              ROOM NUMBER______      Radiographic Studies:    Cervical MRI      Thoracic MRI         Lumbar MRI          Pelvis MRI        CONTRAST    CT Myelogram: _______________   NCS/EMG ________________ ( UE  /  LE )     MRI of ___________________          Other: ____________________      Injections:    KNEE    HIP  Depomedrol _____ mg Euflexxa _____    _______________ TFESI/SNRB  _______________ SI Joint  _______________ RANDALL    _______________ Facet  _______________Piriformis/ Sciatica      Medications:    Oral Steroids _______________  NSAIDS _______________    Muscle Relaxers _______________  Neurontin/Lyrica _______________    Pain Medicine _______________  Other _______________                       Physical Therapy:    Lumbar     Thoracic      Cervical     Hip       Knee       Shoulder               Traction          Ultrasound          Dry Needling      Referral:    Pain referral:  [facility]   [facility]   Other: ______________________________    Follow-up/ Refer__________________________________________________    Authorization to hold blood thinners:___________________________________

## 2022-12-14 NOTE — PROGRESS NOTES
Name: Willie Ravi  YOB: 1980  Gender: male  MRN: [id number]    CC: Back Pain (Low back pain with pelvis and thigh pain)       HPI: This is a 38y.o. year old male who  has a past medical history of Benign essential hypertension, Diabetes mellitus type 2, noninsulin dependent ([address] Fatty liver, Hyperlipidemia, and Testicular mass. .  Patient has a history of testicular cancer on the left. Status post left radical orchiectomy. Was with an inguinal approach. Is also status postchemotherapy. PET scan in November 2022 confirmed markedly improved disease per oncology. He still has some remaining retroperitoneal lymph nodes which are the left para-aortic which would be very difficult to intervene surgically. And they are planning on repeating CT scan in 3 months. The patient about 6 weeks ago began having right-sided lower back pain seems very superficial around the bones on the right side. This is occurring with sitting and lying down. The right side does not radiate into the legs. He is also having numbness in the left anterior thigh only with standing. If he sits down the numbness dissipates. He has been taking ibuprofen and Tylenol. He was told that he may have some nerve damage from the inguinal approach from the orchiectomy. Initially he did have inner thigh numbness but not the anterior. This only began in October and that was months after his surgery. This patient  has not had lumbar surgery in the past.      AMB PAIN ASSESSMENT 12/14/2022   Location of Pain Back   Location Modifiers Left;Right   Severity of Pain 7   Quality of Pain Aching   Duration of Pain A few hours   Frequency of Pain Constant   Date Pain First Started 11/7/2022   Aggravating Factors Other (Comment)   Limiting Behavior Some   Relieving Factors Other (Comment); Nsaids   Result of Injury No   Work-Related Injury No   Are there other pain locations you wish to document?  No ROS/Meds/PSH/PMH/FH/SH: I personally reviewed the patient's collected intake data. Below are the pertinents:    No Known Allergies      Current Outpatient Medications:     meloxicam (MOBIC) 7.5 MG tablet, Take 1 tablet by mouth daily, Disp: 30 tablet, Rfl: 0    lidocaine-prilocaine (EMLA) 2.5-2.5 % cream, Apply topically as needed. (Patient not taking: No sig reported), Disp: 1 each, Rfl: 2    telmisartan-amLODIPine (TWYNSTA) 80-5 MG TABS tablet, Take 1 tablet by mouth daily, Disp: , Rfl:     atenolol (TENORMIN) 50 MG tablet, Take 50 mg by mouth daily, Disp: , Rfl:     atorvastatin (LIPITOR) 20 MG tablet, Take 20 mg by mouth nightly, Disp: , Rfl:     metFORMIN (GLUCOPHAGE-XR) 500 MG extended release tablet, Take 2 with breakfast and supper - Diabetes, Disp: , Rfl:     spironolactone (ALDACTONE) 50 MG tablet, Take 50 mg by mouth daily, Disp: , Rfl:     Past Surgical History:   Procedure Laterality Date    APPENDECTOMY      IR PORT PLACEMENT EQUAL OR GREATER THAN 5 YEARS  7/22/2022    IR PORT PLACEMENT EQUAL OR GREATER THAN 5 YEARS 7/22/2022 [facility]IOLOGY SPECIALS    KIDNEY STONE SURGERY Right 2011    TESTICLE REMOVAL Left 7/7/2022    LEFT RADICAL ORCHIECTOMY [facility] APPROACH performed by Patrick Manzanares MD at [facility] MAIN OR       Patient Active Problem List   Diagnosis    Hypertension, essential    Hyperlipidemia    Type 2 diabetes mellitus with hyperglycemia, without long-term current use of insulin (HCC)    Testicular mass    Cancer of descended left testis (HCC)    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 He has never used smokeless tobacco.  Alcohol:   Social History     Substance and Sexual Activity   Alcohol Use Yes    Comment: one or two per week        Physical Exam:   BMI: There is no height or weight on file to calculate BMI.     GENERAL:  Adult in no acute distress, moderately obese patient is appropriately conversant  MSK:  Examination of the lumbar spine reveals no sagittal or coronal imbalance   There is moderate tenderness to palpation along the spinous processes and paraspinal musculature. There is thoracic kyphosis  The patient ambulates with a normal gait. ROM of bilateral hip(s) reveals no irritability. NEURO:  Cranial nerves grossly intact. No motor deficits. Straight leg testing is negative bilateral  Sensory testing reveals intact sensation to light touch and in the distribution of the L3-S1 dermatomes bilaterally except for decreased sensation light touch left anterior thigh  Ankle jerk is negative for clonus    Reflexes   Right Left   Quadriceps (L4) 2 2   Achilles (S1) 2 2     Strength testing in the lower extremity reveals the following based on the 5 point grading scale:     HF (L2) H Ab (L5) KE (L3/4) ADF (L4) EHL (L5) A Ev (S1) APF (S1)   Right 5 5 5 5 5 5 5   Left 5 5 5 4+ 5 5 5     PSYCH:  Alert and oriented X 3. Appropriate affect. Intact judgment and insight. Radiographic Studies:     AP, lateral and spot views of the lumbar spine:    AP of the lumbar spine shows normal alignment slight rotation to the right. Pelvis is level. Sagittal view reveals well-maintained to space heights no fracture noted. No obvious lesions are noted. No anterior listhesis. X-ray impression: No acute abnormality noted of the lumbar spine. Also reviewed the CT scan chest abdomen pelvis from October 31, 2022 and do not see lesions of the spine no obvious abnormality noted. Assessment/Plan:       Diagnosis Orders   1. Low back pain, unspecified back pain laterality, unspecified chronicity, unspecified whether sciatica present  XR LUMBAR SPINE (2-3 VIEWS)      2. Kyphosis of thoracic region, unspecified kyphosis type        3. 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  Cancer of descended left testis [facility])          Discussed lower back pain may be some mild degenerative changes that have been activated with use of the chemotherapy which can cause some joint pain at times. There may be some disc bulging and that is not able to be seen on the noncontrasted CT scan. We also discussed the left anterior leg numbness could be from nerve compression of the spine but also could be some nerve damage from the inguinal approach from the orchiectomy. I think it is less likely that it would be the lymph nodes in the abdomen because they are not more anteriorly located. Ultimately with his history of cancer I do recommend MRI scan of the lumbar spine to further evaluate the newer onset of back pain and left leg numbness.    - NSAID: The patient is agreeable to a trial of nonsteroidal anti-inflammatory drugs (NSAIDs). We discussed risks associated with their use, including GI tract or other bleeding, and also some cardiac risk. Instructions were given to discontinue the NSAID if there is any sign of GI bleed, chest pain, or shortness of breath. Continued use of NSAIDS is recommended to be managed by PCP to monitor kidney and liver function.  - A MRI was ordered to delineate anatomy, confirm the diagnosis and assess the severity. 4 This is a undiagnosed new problem with uncertain prognosis    Orders Placed This Encounter   Medications    meloxicam (MOBIC) 7.5 MG tablet     Sig: Take 1 tablet by mouth daily     Dispense:  30 tablet     Refill:  0        Orders Placed This Encounter   Procedures    XR LUMBAR SPINE (2-3 VIEWS)              Return for MRI results [facility]. Tyler Perdomo PA-C  12/14/22      Elements of this note were created using speech recognition software. As such, errors of speech recognition may be present.
